# Patient Record
Sex: MALE | Race: WHITE | NOT HISPANIC OR LATINO | Employment: OTHER | URBAN - METROPOLITAN AREA
[De-identification: names, ages, dates, MRNs, and addresses within clinical notes are randomized per-mention and may not be internally consistent; named-entity substitution may affect disease eponyms.]

---

## 2024-03-12 ENCOUNTER — OFFICE VISIT (OUTPATIENT)
Dept: MEDICAL GROUP | Facility: PHYSICIAN GROUP | Age: 69
End: 2024-03-12
Payer: MEDICARE

## 2024-03-12 ENCOUNTER — HOSPITAL ENCOUNTER (OUTPATIENT)
Dept: LAB | Facility: MEDICAL CENTER | Age: 69
End: 2024-03-12
Attending: FAMILY MEDICINE
Payer: MEDICARE

## 2024-03-12 VITALS
DIASTOLIC BLOOD PRESSURE: 78 MMHG | OXYGEN SATURATION: 98 % | TEMPERATURE: 97.8 F | SYSTOLIC BLOOD PRESSURE: 120 MMHG | HEIGHT: 72 IN | HEART RATE: 75 BPM | BODY MASS INDEX: 27.25 KG/M2 | WEIGHT: 201.2 LBS

## 2024-03-12 DIAGNOSIS — Z12.5 PROSTATE CANCER SCREENING: ICD-10-CM

## 2024-03-12 DIAGNOSIS — R35.0 BENIGN PROSTATIC HYPERPLASIA WITH URINARY FREQUENCY: ICD-10-CM

## 2024-03-12 DIAGNOSIS — N40.1 BENIGN PROSTATIC HYPERPLASIA WITH URINARY FREQUENCY: ICD-10-CM

## 2024-03-12 DIAGNOSIS — Z13.6 SCREENING FOR CARDIOVASCULAR CONDITION: ICD-10-CM

## 2024-03-12 DIAGNOSIS — R35.0 BENIGN PROSTATIC HYPERPLASIA WITH URINARY FREQUENCY: Primary | ICD-10-CM

## 2024-03-12 DIAGNOSIS — Z11.59 NEED FOR HEPATITIS C SCREENING TEST: ICD-10-CM

## 2024-03-12 DIAGNOSIS — N40.1 BENIGN PROSTATIC HYPERPLASIA WITH URINARY FREQUENCY: Primary | ICD-10-CM

## 2024-03-12 LAB
ALBUMIN SERPL BCP-MCNC: 4.4 G/DL (ref 3.2–4.9)
ALBUMIN/GLOB SERPL: 1.5 G/DL
ALP SERPL-CCNC: 94 U/L (ref 30–99)
ALT SERPL-CCNC: 28 U/L (ref 2–50)
ANION GAP SERPL CALC-SCNC: 11 MMOL/L (ref 7–16)
AST SERPL-CCNC: 21 U/L (ref 12–45)
BILIRUB SERPL-MCNC: 0.4 MG/DL (ref 0.1–1.5)
BUN SERPL-MCNC: 24 MG/DL (ref 8–22)
CALCIUM ALBUM COR SERPL-MCNC: 9.4 MG/DL (ref 8.5–10.5)
CALCIUM SERPL-MCNC: 9.7 MG/DL (ref 8.5–10.5)
CHLORIDE SERPL-SCNC: 107 MMOL/L (ref 96–112)
CHOLEST SERPL-MCNC: 226 MG/DL (ref 100–199)
CO2 SERPL-SCNC: 27 MMOL/L (ref 20–33)
CREAT SERPL-MCNC: 1.43 MG/DL (ref 0.5–1.4)
FASTING STATUS PATIENT QL REPORTED: NORMAL
GFR SERPLBLD CREATININE-BSD FMLA CKD-EPI: 53 ML/MIN/1.73 M 2
GLOBULIN SER CALC-MCNC: 3 G/DL (ref 1.9–3.5)
GLUCOSE SERPL-MCNC: 87 MG/DL (ref 65–99)
HCV AB SER QL: NORMAL
HDLC SERPL-MCNC: 28 MG/DL
LDLC SERPL CALC-MCNC: 148 MG/DL
POTASSIUM SERPL-SCNC: 4.3 MMOL/L (ref 3.6–5.5)
PROT SERPL-MCNC: 7.4 G/DL (ref 6–8.2)
SODIUM SERPL-SCNC: 145 MMOL/L (ref 135–145)
TRIGL SERPL-MCNC: 251 MG/DL (ref 0–149)

## 2024-03-12 PROCEDURE — 80053 COMPREHEN METABOLIC PANEL: CPT

## 2024-03-12 PROCEDURE — 84153 ASSAY OF PSA TOTAL: CPT

## 2024-03-12 PROCEDURE — 84154 ASSAY OF PSA FREE: CPT

## 2024-03-12 PROCEDURE — 80061 LIPID PANEL: CPT

## 2024-03-12 PROCEDURE — 3074F SYST BP LT 130 MM HG: CPT | Performed by: FAMILY MEDICINE

## 2024-03-12 PROCEDURE — 36415 COLL VENOUS BLD VENIPUNCTURE: CPT

## 2024-03-12 PROCEDURE — 99204 OFFICE O/P NEW MOD 45 MIN: CPT | Performed by: FAMILY MEDICINE

## 2024-03-12 PROCEDURE — 3078F DIAST BP <80 MM HG: CPT | Performed by: FAMILY MEDICINE

## 2024-03-12 PROCEDURE — G0472 HEP C SCREEN HIGH RISK/OTHER: HCPCS | Mod: GA

## 2024-03-12 RX ORDER — TAMSULOSIN HYDROCHLORIDE 0.4 MG/1
0.4 CAPSULE ORAL
Qty: 90 CAPSULE | Refills: 3 | Status: SHIPPED | OUTPATIENT
Start: 2024-03-12

## 2024-03-12 RX ORDER — CHOLECALCIFEROL (VITAMIN D3) 50 MCG
2000 TABLET ORAL DAILY
COMMUNITY

## 2024-03-12 ASSESSMENT — ENCOUNTER SYMPTOMS
SHORTNESS OF BREATH: 0
NAUSEA: 0
PALPITATIONS: 0
VOMITING: 0
ABDOMINAL PAIN: 0

## 2024-03-12 ASSESSMENT — PATIENT HEALTH QUESTIONNAIRE - PHQ9: CLINICAL INTERPRETATION OF PHQ2 SCORE: 0

## 2024-03-12 NOTE — PROGRESS NOTES
Subjective:     CC:  The primary encounter diagnosis was Benign prostatic hyperplasia with urinary frequency. Diagnoses of Prostate cancer screening, Need for hepatitis C screening test, and Screening for cardiovascular condition were also pertinent to this visit.    HISTORY OF THE PRESENT ILLNESS: Patient is a 68 y.o. male. This pleasant patient is here today to establish care and discuss the following:    Problem   Benign Prostatic Hyperplasia With Urinary Frequency    Started this past summer  Abnormal/weak stream, nocturia (6x), frequency  Patient denies pain  Getting worse since [place]  Is drinking more fluids  Interrupting travel and his day to day life  Is taking prostadine  -OTC supplement that helps during the day         Health Maintenance: Completed    ROS:   Review of Systems   Respiratory:  Negative for shortness of breath.    Cardiovascular:  Negative for chest pain and palpitations.   Gastrointestinal:  Negative for abdominal pain, nausea and vomiting.         Objective:     Exam: /78 (BP Location: Left arm, Patient Position: Sitting, BP Cuff Size: Adult long)   Pulse 75   Temp 36.6 °C (97.8 °F) (Temporal)   Ht 1.829 m (6')   Wt 91.3 kg (201 lb 3.2 oz)   SpO2 98%  Body mass index is 27.29 kg/m².    Physical Exam  Constitutional:       Appearance: Normal appearance.   HENT:      Head: Normocephalic and atraumatic.      Right Ear: Tympanic membrane, ear canal and external ear normal.      Left Ear: Tympanic membrane, ear canal and external ear normal.      Nose: Nose normal.      Mouth/Throat:      Mouth: Mucous membranes are moist.      Pharynx: Oropharynx is clear.   Eyes:      Extraocular Movements: Extraocular movements intact.      Conjunctiva/sclera: Conjunctivae normal.      Pupils: Pupils are equal, round, and reactive to light.   Cardiovascular:      Rate and Rhythm: Normal rate and regular rhythm.      Heart sounds: No murmur heard.  Pulmonary:      Effort: Pulmonary effort is normal.       Breath sounds: Normal breath sounds. No wheezing.   Abdominal:      General: Abdomen is flat. Bowel sounds are normal.      Palpations: Abdomen is soft.   Skin:     General: Skin is warm and dry.   Neurological:      General: No focal deficit present.      Mental Status: He is alert and oriented to person, place, and time.   Psychiatric:         Mood and Affect: Mood normal.         Assessment & Plan:   68 y.o. male with the following -    1. Benign prostatic hyperplasia with urinary frequency  - PSA TOTAL + %FREE; Future  - Comp Metabolic Panel; Future  - tamsulosin (FLOMAX) 0.4 MG capsule; Take 1 Capsule by mouth 1/2 hour after breakfast.  Dispense: 90 Capsule; Refill: 3    Uncontrolled  Patient due for lab work  -will order  Will start patient on Flomax  -Has tolerated in the past when he had a kidney stone years ago  Discussed lifestyle modifications    2. Prostate cancer screening  - PSA TOTAL + %FREE; Future    Patient due for lab work  -will order    3. Need for hepatitis C screening test  - HCV Scrn ( [id number] [employer]); Future    Patient due for lab work  -will order    4. Screening for cardiovascular condition  - Comp Metabolic Panel; Future  - Lipid Profile; Future     Patient due for lab work  -will order  Patient does not wish to be on medication        Return in about 6 weeks (around 2024) for Med F/U, Lab F/U.    Please note that this dictation was created using voice recognition software. I have made every reasonable attempt to correct obvious errors, but I expect that there are errors of grammar and possibly content that I did not discover before finalizing the note.

## 2024-03-13 DIAGNOSIS — D48.5 NEOPLASM OF UNCERTAIN BEHAVIOR OF SKIN: ICD-10-CM

## 2024-03-15 DIAGNOSIS — N40.1 BENIGN PROSTATIC HYPERPLASIA WITH URINARY FREQUENCY: ICD-10-CM

## 2024-03-15 DIAGNOSIS — R35.0 BENIGN PROSTATIC HYPERPLASIA WITH URINARY FREQUENCY: ICD-10-CM

## 2024-03-15 DIAGNOSIS — R97.20 ELEVATED PSA, GREATER THAN OR EQUAL TO 20 NG/ML: ICD-10-CM

## 2024-03-15 LAB
PSA FREE MFR SERPL: 18 %
PSA FREE SERPL-MCNC: 30 NG/ML
PSA SERPL-MCNC: 163 NG/ML (ref 0–4)

## 2024-04-01 ENCOUNTER — APPOINTMENT (OUTPATIENT)
Dept: UROLOGY | Facility: MEDICAL CENTER | Age: 69
End: 2024-04-01
Payer: MEDICARE

## 2024-04-01 VITALS
TEMPERATURE: 99.6 F | BODY MASS INDEX: 26.9 KG/M2 | HEART RATE: 82 BPM | OXYGEN SATURATION: 98 % | SYSTOLIC BLOOD PRESSURE: 132 MMHG | DIASTOLIC BLOOD PRESSURE: 74 MMHG | HEIGHT: 72 IN | WEIGHT: 198.6 LBS

## 2024-04-01 DIAGNOSIS — R68.89 ABNORMAL DIGITAL RECTAL EXAM: ICD-10-CM

## 2024-04-01 DIAGNOSIS — R97.20 ELEVATED PSA: ICD-10-CM

## 2024-04-01 PROCEDURE — 3078F DIAST BP <80 MM HG: CPT | Performed by: UROLOGY

## 2024-04-01 PROCEDURE — 3075F SYST BP GE 130 - 139MM HG: CPT | Performed by: UROLOGY

## 2024-04-01 PROCEDURE — 99204 OFFICE O/P NEW MOD 45 MIN: CPT | Performed by: UROLOGY

## 2024-04-01 NOTE — PROGRESS NOTES
Chief Complaint: elevated PSA    HPI: Manuel Quinn is a 68 y.o. male with a history of a left inguinal hernia and kidney stone, who presented to [facility] care with his new primary care physician (Dr. Franklin) and discussed a weakened urinary stream, significant nocturia (x6), and urinary frequency. He was started on tamsulosin, and also underwent prostate cancer screening with an initial PSA of 163.0 on 3/12/2024. He says the tamsulosin has improved his flow, and his nocturia has decreased from 6 to 2 times per night. Despite this, his PVR remains very elevated (630 ml today).    At the time of this PSA study, he had no symptoms of dysuria, foul-smelling urine, gross hematuria, fevers, chills, or other symptoms of urinary tract infection.    He has been taking some ibuprofen in the evening for a feeling of pressure (not described as actual pain) in the perineum. This has helped him find comfortable positions to sleep.     He feels well overall, with no generalized symptoms.     He has no known family history of prostate cancer; he was adopted.      Symptoms:  Frequency: yes  Urgency: no  Nocturia: yes  Stress incontinence: no  Urge incontinence: no  Dysuria: no  Gross hematuria: no  Weakened stream: yes  Strain to empty: yes      Past Medical History:  Past Medical History:   Diagnosis Date    Inguinal hernia     L    Kidney stone        Past Surgical History:  No past surgical history on file.    Family History:  Family History   Family history unknown: Yes       Social History:  Social History     Socioeconomic History    Marital status:      Spouse name: Not on file    Number of children: Not on file    Years of education: Not on file    Highest education level: Not on file   Occupational History    Not on file   Tobacco Use    Smoking status: Never    Smokeless tobacco: Never   Vaping Use    Vaping Use: Never used   Substance and Sexual Activity    Alcohol use: Yes     Comment: occ    Drug use:  Never    Sexual activity: Not on file   Other Topics Concern    Not on file   Social History Narrative    Not on file     Social Determinants of Health     Financial Resource Strain: Not on file   Food Insecurity: Not on file   Transportation Needs: Not on file   Physical Activity: Not on file   Stress: Not on file   Social Connections: Not on file   Intimate Partner Violence: Not on file   Housing Stability: Not on file       Medications:  Current Outpatient Medications   Medication Sig Dispense Refill    Cholecalciferol (VITAMIN D) 2000 UNIT Tab Take 2,000 Units by mouth every day.      Zinc 20 MG Cap Take 20 mg by mouth every day.      tamsulosin (FLOMAX) 0.4 MG capsule Take 1 Capsule by mouth 1/2 hour after breakfast. 90 Capsule 3     No current facility-administered medications for this visit.       Allergies:  Allergies   Allergen Reactions    Eggs Or Egg-Derived Products [Chicken-Derived Products] Diarrhea and Vomiting     25 years       Review of Systems:  Constitutional: Negative for fever, chills and malaise/fatigue.   HENT: Negative for congestion.    Eyes: Negative for pain.   Respiratory: Negative for cough and shortness of breath.    Cardiovascular: Negative for leg swelling.   Gastrointestinal: Negative for nausea, vomiting, abdominal pain and diarrhea.   Genitourinary: Negative for dysuria and hematuria.   Skin: Negative for rash.   Neurological: Negative for dizziness, focal weakness and headaches.   Endo/Heme/Allergies: Does not bruise/bleed easily.   Psychiatric/Behavioral: Negative for depression.  The patient is not nervous/anxious.        Physical Exam:  There were no vitals filed for this visit.    GENERAL: well appearing, well nourished, NAD  RESP: respiratory effort normal  ABDOMEN: soft, nontender, nondistended, no masses or organomegaly  HERNIAS: large reducible left inguinal hernia; nontender  SKIN/LYMPH: normal coloration and turgor, no suspicious skin lesions noted  NEURO/PSYCH: alert,  "oriented, normal speech, no focal findings or movement disorder noted  EXTREMITIES: peripheral pulses normal, no pedal edema, no clubbing or cyanosis  GENITOURINARY: normal male external genitalia, penis is normal, and normal testes  RECTAL: Normal rectal tone, prostate is very firm bilaterally without distinct nodules    PVR: 632 mL  (bladder scanner)     Data Review:    Labs:  POCT UA No results found for: \"POCCOLOR\", \"POCAPPEAR\", \"POCLEUKEST\", \"POCNITRITE\", \"POCUROBILIGE\", \"POCPROTEIN\", \"POCURPH\", \"POCBLOOD\", \"POCSPGRV\", \"POCKETONES\", \"POCBILIRUBIN\", \"POCGLUCUA\"   CBC No results found for: \"WBC\", \"RBC\", \"HEMOGLOBIN\", \"HEMATOCRIT\", \"MCV\", \"MCH\", \"MCHC\", \"RDW\", \"MPV\", \"LYMPHOCYTES\", \"LYMPHS\", \"MONOCYTES\", \"MONOS\", \"EOSINOPHILS\", \"EOS\", \"BASOPHILS\", \"BASO\", \"NRBC\"    CMP   Lab Results   Component Value Date/Time    SODIUM 145 03/12/2024 1451    POTASSIUM 4.3 03/12/2024 1451    CHLORIDE 107 03/12/2024 1451    CO2 27 03/12/2024 1451    ANION 11.0 03/12/2024 1451    GLUCOSE 87 03/12/2024 1451    BUN 24 (H) 03/12/2024 1451    CREATININE 1.43 (H) 03/12/2024 1451    GFRCKD 53 (A) 03/12/2024 1451    CALCIUM 9.7 03/12/2024 1451    CORRCALC 9.4 03/12/2024 1451    ASTSGOT 21 03/12/2024 1451    ALTSGPT 28 03/12/2024 1451    ALKPHOSPHAT 94 03/12/2024 1451    TBILIRUBIN 0.4 03/12/2024 1451    ALBUMIN 4.4 03/12/2024 1451    TOTPROTEIN 7.4 03/12/2024 1451    GLOBULIN 3.0 03/12/2024 1451    AGRATIO 1.5 03/12/2024 1451     PSA   Lab Results   Component Value Date/Time    PSATOTAL 163.0 (H) 03/12/2024 1451       Imaging:   None    Assessment: 68 y.o. male with a history of obstructive urinary symptoms and significant PSA elevation (163), here for initial urological evaluation. Physical exam today notable for concerning firm texture of both lobes of the prostate, though without distinct nodules.     We discussed the differential diagnosis of an elevated PSA, and further recommended workup. He's otherwise in good health with at least a " 10 year life expectancy, and so I do recommend proceeding with prostate biopsy and, if malignancy is discovered, active treatment with either radical prostatectomy or prostate radiotherapy with androgen deprivation therapy. We discussed both of these today briefly.        Plan:    -Increase tamsulosin to 0.8 mg daily, taken before bedtime  -Take ibuprofen according to bottle instructions for 7-10 days  -Repeat PSA after course of ibuprofen and increased tamsulosin  -Schedule prostate MRI and prostate biopsy; will cancel studies if PSA drops dramatically, but given level of concern I'd like to get these scheduled soon  -Will arrange for abdominopelvic imaging and bone scan if biopsy is positive        Miguel Austin MD

## 2024-04-11 ENCOUNTER — HOSPITAL ENCOUNTER (OUTPATIENT)
Dept: LAB | Facility: MEDICAL CENTER | Age: 69
End: 2024-04-11
Attending: UROLOGY
Payer: MEDICARE

## 2024-04-11 DIAGNOSIS — R68.89 ABNORMAL DIGITAL RECTAL EXAM: ICD-10-CM

## 2024-04-11 DIAGNOSIS — R97.20 ELEVATED PSA: ICD-10-CM

## 2024-04-11 LAB — PSA SERPL-MCNC: 148 NG/ML (ref 0–4)

## 2024-04-11 PROCEDURE — 36415 COLL VENOUS BLD VENIPUNCTURE: CPT

## 2024-04-11 PROCEDURE — 84153 ASSAY OF PSA TOTAL: CPT

## 2024-04-16 ENCOUNTER — PATIENT MESSAGE (OUTPATIENT)
Dept: UROLOGY | Facility: MEDICAL CENTER | Age: 69
End: 2024-04-16
Payer: MEDICARE

## 2024-04-16 DIAGNOSIS — R35.0 BENIGN PROSTATIC HYPERPLASIA WITH URINARY FREQUENCY: ICD-10-CM

## 2024-04-16 DIAGNOSIS — R97.20 ELEVATED PSA: ICD-10-CM

## 2024-04-16 DIAGNOSIS — N40.1 BPH WITH OBSTRUCTION/LOWER URINARY TRACT SYMPTOMS: ICD-10-CM

## 2024-04-16 DIAGNOSIS — N40.1 BENIGN PROSTATIC HYPERPLASIA WITH URINARY FREQUENCY: ICD-10-CM

## 2024-04-16 DIAGNOSIS — N13.8 BPH WITH OBSTRUCTION/LOWER URINARY TRACT SYMPTOMS: ICD-10-CM

## 2024-04-16 RX ORDER — LEVOFLOXACIN 500 MG/1
500 TABLET, FILM COATED ORAL DAILY
Qty: 3 TABLET | Refills: 0 | Status: SHIPPED | OUTPATIENT
Start: 2024-04-16 | End: 2024-04-19

## 2024-05-06 RX ORDER — TAMSULOSIN HYDROCHLORIDE 0.4 MG/1
0.8 CAPSULE ORAL
Qty: 90 CAPSULE | Refills: 3 | Status: SHIPPED | OUTPATIENT
Start: 2024-05-06

## 2024-05-11 ENCOUNTER — HOSPITAL ENCOUNTER (OUTPATIENT)
Dept: RADIOLOGY | Facility: MEDICAL CENTER | Age: 69
End: 2024-05-11
Attending: UROLOGY
Payer: MEDICARE

## 2024-05-11 DIAGNOSIS — R97.20 ELEVATED PSA: ICD-10-CM

## 2024-05-11 DIAGNOSIS — R68.89 ABNORMAL DIGITAL RECTAL EXAM: ICD-10-CM

## 2024-05-11 RX ADMIN — GADOTERIDOL 18 ML: 279.3 INJECTION, SOLUTION INTRAVENOUS at 08:31

## 2024-05-11 RX ADMIN — GLUCAGON 1 MG: 1 INJECTION, POWDER, LYOPHILIZED, FOR SOLUTION INTRAMUSCULAR; INTRAVENOUS at 07:55

## 2024-05-13 ENCOUNTER — OFFICE VISIT (OUTPATIENT)
Dept: UROLOGY | Facility: MEDICAL CENTER | Age: 69
End: 2024-05-13
Payer: MEDICARE

## 2024-05-13 VITALS
SYSTOLIC BLOOD PRESSURE: 130 MMHG | HEART RATE: 71 BPM | HEIGHT: 72 IN | BODY MASS INDEX: 26.94 KG/M2 | OXYGEN SATURATION: 99 % | DIASTOLIC BLOOD PRESSURE: 80 MMHG | TEMPERATURE: 97.1 F

## 2024-05-13 DIAGNOSIS — R97.20 ELEVATED PSA: ICD-10-CM

## 2024-05-13 PROCEDURE — 3079F DIAST BP 80-89 MM HG: CPT | Performed by: UROLOGY

## 2024-05-13 PROCEDURE — 3075F SYST BP GE 130 - 139MM HG: CPT | Performed by: UROLOGY

## 2024-05-13 PROCEDURE — 99212 OFFICE O/P EST SF 10 MIN: CPT | Performed by: UROLOGY

## 2024-05-13 NOTE — PROGRESS NOTES
Procedure Visit    Procedure: Transrectal ultrasound and guided prostate biopsy    Patient: Manuel Quinn    Age: 69 y.o.    MRN: [id number]    Indications for procedure: Manuel Quinn is a 69 y.o. male with elevated PSA. He is here today for transrectal ultrasound and guided prostate biopsy. He had a multiparametric MRI of the prostate on 5/11/24, and this demonstrated no single lesion, but the entire gland had ill-defined T2 hypointensity with concern for tumor extending to the seminal vesicles, and an overall volume of 30 cc.     Informed consent was obtained.    He was given levofloxacin and ceftriaxone as antibiotic prophylaxis.    Procedure details:  Prior to beginning the biopsy the patient mentioned taking two tablets of Advil last evening. Given the increased risk of bleeding, we halted and will plan on rescheduling for a couple weeks from now when he returns from an upcoming trip.     Plan:  -Reschedule biopsy upon return from upcoming trip (first week of June)  -Take first prophylactic antibiotic (levaquin) the day prior to the biopsy  -Do not take any ibuprofen (Motrin, Advil, etc) for 7 days prior to the biopsy  -Will order bone scan now given high concern for prostate cancer on MRI and with PSA>100

## 2024-05-29 ENCOUNTER — HOSPITAL ENCOUNTER (OUTPATIENT)
Dept: RADIOLOGY | Facility: MEDICAL CENTER | Age: 69
End: 2024-05-29
Attending: UROLOGY
Payer: MEDICARE

## 2024-05-29 DIAGNOSIS — R97.20 ELEVATED PSA: ICD-10-CM

## 2024-06-04 ENCOUNTER — HOSPITAL ENCOUNTER (OUTPATIENT)
Dept: LAB | Facility: MEDICAL CENTER | Age: 69
End: 2024-06-04
Attending: UROLOGY
Payer: MEDICARE

## 2024-06-04 ENCOUNTER — HOSPITAL ENCOUNTER (OUTPATIENT)
Facility: MEDICAL CENTER | Age: 69
End: 2024-06-04
Attending: UROLOGY
Payer: MEDICARE

## 2024-06-04 ENCOUNTER — TELEPHONE (OUTPATIENT)
Dept: UROLOGY | Facility: MEDICAL CENTER | Age: 69
End: 2024-06-04

## 2024-06-04 ENCOUNTER — OFFICE VISIT (OUTPATIENT)
Dept: UROLOGY | Facility: MEDICAL CENTER | Age: 69
End: 2024-06-04
Payer: MEDICARE

## 2024-06-04 VITALS
WEIGHT: 198 LBS | SYSTOLIC BLOOD PRESSURE: 140 MMHG | HEIGHT: 72 IN | OXYGEN SATURATION: 98 % | DIASTOLIC BLOOD PRESSURE: 92 MMHG | TEMPERATURE: 97.5 F | BODY MASS INDEX: 26.82 KG/M2 | HEART RATE: 75 BPM

## 2024-06-04 DIAGNOSIS — N40.1 BENIGN PROSTATIC HYPERPLASIA WITH URINARY FREQUENCY: ICD-10-CM

## 2024-06-04 DIAGNOSIS — N13.30 HYDRONEPHROSIS, UNSPECIFIED HYDRONEPHROSIS TYPE: ICD-10-CM

## 2024-06-04 DIAGNOSIS — R35.0 BENIGN PROSTATIC HYPERPLASIA WITH URINARY FREQUENCY: ICD-10-CM

## 2024-06-04 DIAGNOSIS — R97.20 ELEVATED PSA: ICD-10-CM

## 2024-06-04 LAB
ANION GAP SERPL CALC-SCNC: 14 MMOL/L (ref 7–16)
APPEARANCE UR: CLEAR
BILIRUB UR STRIP-MCNC: NORMAL MG/DL
BUN SERPL-MCNC: 24 MG/DL (ref 8–22)
CALCIUM SERPL-MCNC: 9.6 MG/DL (ref 8.5–10.5)
CHLORIDE SERPL-SCNC: 103 MMOL/L (ref 96–112)
CO2 SERPL-SCNC: 24 MMOL/L (ref 20–33)
COLOR UR AUTO: YELLOW
CREAT SERPL-MCNC: 1.51 MG/DL (ref 0.5–1.4)
GFR SERPLBLD CREATININE-BSD FMLA CKD-EPI: 49 ML/MIN/1.73 M 2
GLUCOSE SERPL-MCNC: 96 MG/DL (ref 65–99)
GLUCOSE UR STRIP.AUTO-MCNC: NORMAL MG/DL
KETONES UR STRIP.AUTO-MCNC: NORMAL MG/DL
LEUKOCYTE ESTERASE UR QL STRIP.AUTO: NORMAL
NITRITE UR QL STRIP.AUTO: NORMAL
PH UR STRIP.AUTO: 6 [PH] (ref 5–8)
POTASSIUM SERPL-SCNC: 3.9 MMOL/L (ref 3.6–5.5)
PROT UR QL STRIP: NORMAL MG/DL
RBC UR QL AUTO: NORMAL
SODIUM SERPL-SCNC: 141 MMOL/L (ref 135–145)
SP GR UR STRIP.AUTO: 1.01
UROBILINOGEN UR STRIP-MCNC: 0.2 MG/DL

## 2024-06-04 PROCEDURE — 76942 ECHO GUIDE FOR BIOPSY: CPT | Mod: 59 | Performed by: UROLOGY

## 2024-06-04 PROCEDURE — G0416 PROSTATE BIOPSY, ANY MTHD: HCPCS

## 2024-06-04 PROCEDURE — 81002 URINALYSIS NONAUTO W/O SCOPE: CPT | Performed by: UROLOGY

## 2024-06-04 PROCEDURE — 76872 US TRANSRECTAL: CPT | Performed by: UROLOGY

## 2024-06-04 PROCEDURE — 55700 PR BIOPSY OF PROSTATE,NEEDLE/PUNCH: CPT | Performed by: UROLOGY

## 2024-06-04 PROCEDURE — 36415 COLL VENOUS BLD VENIPUNCTURE: CPT

## 2024-06-04 PROCEDURE — 80048 BASIC METABOLIC PNL TOTAL CA: CPT

## 2024-06-04 RX ADMIN — Medication 20 ML: at 11:42

## 2024-06-04 NOTE — TELEPHONE ENCOUNTER
I called Mr. Quinn today to discuss his BMP results, which demonstrated a GFR of 49. He's ordered for an urgent renal ultrasound, and if this does confirm hydronephrosis I recommended either placing an indwelling catheter or teaching him intermittent catheterization to resolve any reflux nephropathy from bladder outlet obstruction. If no significant hydro he'll follow up in a couple weeks to discuss his prostate biopsy results from today. Meanwhile he knows to stay well hydrated and let me know if he is having difficulty voiding.

## 2024-06-04 NOTE — PROGRESS NOTES
Procedure Visit    Procedure: Transrectal ultrasound and guided prostate biopsy    Patient: Manuel Quinn    Age: 69 y.o.    MRN: [id number]    Indications for procedure: Manuel Quinn is a 69 y.o. male with significant PSA elevation. He is here today for transrectal ultrasound and guided prostate biopsy. A few days ago he had a bone scan that revealed possible bony metastases; we discussed canceling today and arranging for a bone biopsy, but in the interest of time agreed to proceed today to get a tissue diagnosis for suspected prostate cancer.     Informed consent was obtained.    Urinalysis today was: negative    He was given ceftriaxone as antibiotic prophylaxis.    Procedure details:  The patient was placed in a left lateral decubitus position with his knees flexed toward his chest. After performing a digital rectal examination, the transrectal ultrasound probe was introduced and the prostate examined. There were no hypoechoic lesions or calcifications seen within the prostate. Local anesthesia was then delivered using a total of 10 cc of 1% lidocaine injected into the angle between the prostate and seminal vesicles.     Measurements of the prostate were obtained in both axial and sagittal planes, with the following dimensions:    W: 40.7 mm  H: 28.5 mm  L: 33.6 mm  Volume: 25.5 cc    The prostate biopsies were then obtained in a grid pattern, with a total of 12 core biopsies obtained from the medial and lateral aspects of the right and left prostatic base, mid-gland, and apex.     The patient tolerated the procedure well. There was no bleeding observed at the end of the procedure, and he was observed in the waiting room for about 30 minutes before being discharged home.     Plan:  -Complete antibiotic prophylaxis  -Avoid heavy lifting/straining   -Stat renal ultrasound and BMP given hydronephrosis seen on bone scan  -Understands to seek ED care immediately if any fevers, chills, flu-like symptoms, or  continuous bleeding from rectum  -Return in 1-2 weeks to discuss pathology results

## 2024-06-04 NOTE — TELEPHONE ENCOUNTER
Pt stopped in office in regards to blood post biopsy, per Ma's informed pt that blood is common after this procedure for up to 1 week, verified pt has no other symptoms at this time. Pt requested exam from staff in regards to this, informed pt that all staff are all currently in a training but that I could see if that was a possibility if he didn't mind waiting a few moments. Reiterated again this symptom is normal due the procedure that he had and that I am not clinical staff but could see if one was available if he didn't mind waiting. Pt walked out of clinic prior to clinical staffing seeing pt, or additional appt being made for pt.

## 2024-06-06 LAB — PATHOLOGY CONSULT NOTE: NORMAL

## 2024-06-11 ENCOUNTER — PATIENT MESSAGE (OUTPATIENT)
Dept: UROLOGY | Facility: MEDICAL CENTER | Age: 69
End: 2024-06-11
Payer: MEDICARE

## 2024-06-12 ENCOUNTER — TELEPHONE (OUTPATIENT)
Dept: UROLOGY | Facility: MEDICAL CENTER | Age: 69
End: 2024-06-12
Payer: MEDICARE

## 2024-06-12 DIAGNOSIS — R35.0 URINARY FREQUENCY: ICD-10-CM

## 2024-06-12 DIAGNOSIS — R33.9 INCOMPLETE BLADDER EMPTYING: ICD-10-CM

## 2024-06-12 RX ORDER — TAMSULOSIN HYDROCHLORIDE 0.4 MG/1
0.8 CAPSULE ORAL
Qty: 180 CAPSULE | Refills: 3 | Status: SHIPPED | OUTPATIENT
Start: 2024-06-12 | End: 2025-06-07

## 2024-06-12 NOTE — TELEPHONE ENCOUNTER
I called Mr. Quinn today as he messaged yesterday regarding symptoms he's had since his prostate biopsy.     He says he is straining to void and isn't emptying his bladder, and feels he needs to void very frequently (up to every 20 minutes).    He also is having frequent smaller stools.     I'm going to check a urinalysis and culture, and get him in the [facility] for a PVR and possible catheterization. He's in [place] but coming back Sunday, so we'll make an appointment for Monday morning.

## 2024-06-17 ENCOUNTER — APPOINTMENT (OUTPATIENT)
Dept: UROLOGY | Facility: MEDICAL CENTER | Age: 69
End: 2024-06-17
Payer: MEDICARE

## 2024-06-18 ENCOUNTER — OFFICE VISIT (OUTPATIENT)
Dept: UROLOGY | Facility: MEDICAL CENTER | Age: 69
End: 2024-06-18
Payer: MEDICARE

## 2024-06-18 VITALS
TEMPERATURE: 98.3 F | SYSTOLIC BLOOD PRESSURE: 120 MMHG | HEART RATE: 86 BPM | HEIGHT: 72 IN | WEIGHT: 198 LBS | BODY MASS INDEX: 26.82 KG/M2 | DIASTOLIC BLOOD PRESSURE: 74 MMHG | OXYGEN SATURATION: 98 %

## 2024-06-18 DIAGNOSIS — C79.51 PROSTATE CANCER METASTATIC TO BONE (HCC): ICD-10-CM

## 2024-06-18 DIAGNOSIS — R33.9 URINARY RETENTION: ICD-10-CM

## 2024-06-18 DIAGNOSIS — C61 PROSTATE CANCER METASTATIC TO BONE (HCC): ICD-10-CM

## 2024-06-18 DIAGNOSIS — N13.30 BILATERAL HYDRONEPHROSIS: ICD-10-CM

## 2024-06-18 PROCEDURE — 3078F DIAST BP <80 MM HG: CPT | Performed by: UROLOGY

## 2024-06-18 PROCEDURE — 3074F SYST BP LT 130 MM HG: CPT | Performed by: UROLOGY

## 2024-06-18 PROCEDURE — 99215 OFFICE O/P EST HI 40 MIN: CPT | Performed by: UROLOGY

## 2024-06-18 NOTE — PROGRESS NOTES
Chief Complaint: prostate cancer, urinary retention    HPI: Benny Quinn is a 69 y.o. male with a history of significant elevation of PSA, status post recent prostate biopsy (6/4/24) which revealed grade group 3 prostate cancer in all but one core (which did not have prostate tissue present). He developed urinary retention following the biopsy, and was out of town but was seen in a local hospital which treated him with Mno placement which lead to rapid recovery in acute renal failure. While hospitalized he was also diagnosed with new onset of atrial fibrillation.     He came back to [place] over the weekend so we could meet today and discuss his pathology and imaging studies.    He feels much better with the catheter and recovery of renal function; he's lost the water weight he had quickly gained, and has resolution of lower extremity edema.       Pathology Specimen  Order: [id number]   Status: Final result       Visible to patient: Yes (seen)       Next appt: 06/26/2024 at 03:30 PM in Radiology ([facility] US 9)    1 Result Note        Narrative  Performed by: [facility]              [address] [place] [address]              Phone [phone]          Fax [phone]  Monica Freitas M.D.     Tresa Bray M.D.     SHANNA [email or web address].                            SHANNA [email or web address].  JOHANNA Gifford M.D.     Felice Murphy,                                    D.O.    Patient:    BENNY QUINN         Specimen    [id number]                                           #:      Copies to:                        SURGICAL PATHOLOGY CONSULTATION    FINAL DIAGNOSIS:    A. Left lateral base:         Prostatic adenocarcinoma, grade group 3 ([place] 4+3=7), 14 mm          focus, involving >95% of submitted core biopsy.  B. Left medial base:         Prostatic adenocarcinoma, grade group 3 (Aquiles 4+3=7), 10 mm          focus, involving >95% of  submitted core biopsy.         Perineural invasion present.  C. Left lateral mid:         Prostatic adenocarcinoma, grade group 3 ([place] 4+3=7), 8 mm          focus, involving >95% of submitted core biopsy.         Perineural invasion present.  D. Left medial mid:         Prostatic adenocarcinoma, grade group 3 (Aquiles 4+3=7), 6 mm          focus, involving >95% of submitted core biopsy.         Perineural invasion present.  E. Left lateral apex:         Prostatic adenocarcinoma, grade group 3 (Aquiles 4+3=7), 7 mm          focus, involving  90% of submitted core biopsy.         Perineural invasion present.  F. Left medial apex:         Prostatic adenocarcinoma, grade group 3 ([place] 4+3=7), 8 mm          focus, involving >95% of submitted core biopsy.         Perineural invasion present.  G. Right lateral base:         Minute fragments of benign fibroadipose connective tissue and          rectal mucosa; negative for malignancy however, no prostatic          epithelium is present for assessment.  H. Right medial base:         Prostatic adenocarcinoma, grade group 3 ([place] 4+3=7), 13 mm          focus, involving  90% of submitted core biopsy.         Extraprostatic extension and perineural invasion present.  I. Right lateral mid:         Prostatic adenocarcinoma, grade group 3 ([place] 4+3=7), 10 mm          focus, involving >95% of submitted core biopsy.  J. Right medial mid:         Prostatic adenocarcinoma, grade group 3 (Aquiles 4+3=7), 10 mm          focus, involving >95% of submitted core biopsy.      K. Right lateral apex:         Prostatic adenocarcinoma, grade group 3 (Aquiles 4+3=7), 14 mm          focus, involving >95% of submitted core biopsy.         Perineural invasion present.  L. Right medial apex:         Prostatic adenocarcinoma, grade group 3 ([place] 4+3=7), 10 mm          focus, involving >95% of submitted core biopsy.         Perineural invasion present.      Comment: Overall, the tumor  demonstrates 81-90% [place] pattern 4  (including numerous cribriform glands), with a minor component of  [place] pattern 3.  If additional studies are requested, please utilize  Block K1 which contains sufficient tumor for further testing.          Past Medical History:  Past Medical History:   Diagnosis Date    Inguinal hernia     L    Kidney stone        Past Surgical History:  No past surgical history on file.    Family History:  Family History   Family history unknown: Yes       Social History:  Social History     Socioeconomic History    Marital status:      Spouse name: Not on file    Number of children: Not on file    Years of education: Not on file    Highest education level: Not on file   Occupational History    Not on file   Tobacco Use    Smoking status: Never    Smokeless tobacco: Never   Vaping Use    Vaping status: Never Used   Substance and Sexual Activity    Alcohol use: Yes     Comment: occ    Drug use: Never    Sexual activity: Not on file   Other Topics Concern    Not on file   Social History Narrative    Not on file     Social Determinants of Health     Financial Resource Strain: Not on file   Food Insecurity: Not on file   Transportation Needs: Not on file   Physical Activity: Not on file   Stress: Not on file   Social Connections: Not on file   Intimate Partner Violence: Not on file   Housing Stability: Not on file       Medications:  Current Outpatient Medications   Medication Sig Dispense Refill    tamsulosin (FLOMAX) 0.4 MG capsule Take 2 Capsules by mouth 1/2 hour after breakfast for 360 days. Take before bed (not after breakfast) 180 Capsule 3    Cholecalciferol (VITAMIN D) 2000 UNIT Tab Take 2,000 Units by mouth every day.      Zinc 20 MG Cap Take 20 mg by mouth every day.       No current facility-administered medications for this visit.       Allergies:  Allergies   Allergen Reactions    Eggs Or Egg-Derived Products [Chicken-Derived Products] Diarrhea and Vomiting     25 years  "    Physical Exam:  Vitals:    06/18/24 1504   BP: 120/74   Pulse: 86   Temp: 36.8 °C (98.3 °F)   SpO2: 98%       GENERAL: well appearing, well nourished, NAD  RESP: respiratory effort normal  ABDOMEN: soft, nontender, nondistended, no masses or organomegaly  SKIN/LYMPH: normal coloration and turgor, no suspicious skin lesions noted  NEURO/PSYCH: alert, oriented, normal speech, no focal findings or movement disorder noted  EXTREMITIES: no pedal edema noted  GENITOURINARY:  Mon draining clear yellow urine      Data Review:    Labs:  POCT UA   Lab Results   Component Value Date/Time    POCCOLOR yellow 06/04/2024 10:48 AM    POCAPPEAR clear 06/04/2024 10:48 AM    POCLEUKEST neg 06/04/2024 10:48 AM    POCNITRITE neg 06/04/2024 10:48 AM    POCUROBILIGE 0.2 06/04/2024 10:48 AM    POCPROTEIN neg 06/04/2024 10:48 AM    POCURPH 6.0 06/04/2024 10:48 AM    POCBLOOD neg 06/04/2024 10:48 AM    POCSPGRV 1.010 06/04/2024 10:48 AM    POCKETONES neg 06/04/2024 10:48 AM    POCBILIRUBIN neg 06/04/2024 10:48 AM    POCGLUCUA neg 06/04/2024 10:48 AM      CBC No results found for: \"WBC\", \"RBC\", \"HEMOGLOBIN\", \"HEMATOCRIT\", \"MCV\", \"MCH\", \"MCHC\", \"RDW\", \"MPV\", \"LYMPHOCYTES\", \"LYMPHS\", \"MONOCYTES\", \"MONOS\", \"EOSINOPHILS\", \"EOS\", \"BASOPHILS\", \"BASO\", \"NRBC\"    CMP   Lab Results   Component Value Date/Time    SODIUM 141 06/04/2024 1226    POTASSIUM 3.9 06/04/2024 1226    CHLORIDE 103 06/04/2024 1226    CO2 24 06/04/2024 1226    ANION 14.0 06/04/2024 1226    GLUCOSE 96 06/04/2024 1226    BUN 24 (H) 06/04/2024 1226    CREATININE 1.51 (H) 06/04/2024 1226    GFRCKD 49 (A) 06/04/2024 1226    CALCIUM 9.6 06/04/2024 1226    CORRCALC 9.4 03/12/2024 1451    ASTSGOT 21 03/12/2024 1451    ALTSGPT 28 03/12/2024 1451    ALKPHOSPHAT 94 03/12/2024 1451    TBILIRUBIN 0.4 03/12/2024 1451    ALBUMIN 4.4 03/12/2024 1451    TOTPROTEIN 7.4 03/12/2024 1451    GLOBULIN 3.0 03/12/2024 1451    AGRATIO 1.5 03/12/2024 1451     INFERTILITY No results found for: \"FSH\", " "\"LH\", \"PROLACT\", \"ESTRADL\", \"TESTOSTERONE\", \"FREETESTOST\", \"TESTLCMS\", \"SEXHORM\"  PSA   Lab Results   Component Value Date/Time    PSATOTAL 148.00 (H) 04/11/2024 1012       Imaging:   MR-RECTAL/PROSTATE PROTOCOL  Order: [id number]   Status: Final result       Visible to patient: Yes (seen)       Next appt: 06/26/2024 at 03:30 PM in Radiology ([facility] 9)       Dx: Elevated PSA; Abnormal digital rectal...    0 Result Notes  Details    Reading Physician Reading Date Result Priority   Evie Gifford M.D.  [phone] 5/11/2024      Narrative & Impression     5/11/2024 8:30 AM     HISTORY/REASON FOR EXAM:  Increasing PSA.  PSA: 148  Prior biopsy: No     TECHNIQUE/EXAM DESCRIPTION:  Multiplanar, multisequence MRI of the prostate without and with contrast.  Magnetic strength: Tejinder 3.0 Britany MRI scanner.  IV gadolinium contrast: 18 mL ProHance contrast     COMPARISON: None.     FINDINGS:  The prostate measures 4.1 x 3.8 x 3.8 cm with a volume of 30 cc. Prostate density is 4.9.     Peripheral zone: No hemorrhage. There is diffuse ill-defined T2 hypointense signal throughout the majority of the peripheral zone with restricted diffusion.     Transitional zone: There is diffuse ill-defined T2 hypointense signal throughout the majority of the transitional zone with restricted diffusion. There is diffuse enhancement.     Prostate capsule: There is disruption of the prostate capsule along the posterior superior gland with abnormal signal extending into the seminal vesicles.     Seminal vesicles: There is abnormal T2 hypointense signal in the central and medial right seminal vesicles.     Bladder: There is diffuse bladder wall trabeculation with bilateral hydroureter.     Lymph nodes: No adenopathy is identified.     Bones: There are ovoid areas of T2 hypointensity in the bilateral ischium inferior obturator ring region which demonstrates mild enhancement after contrast.     There is a large left inguinal hernia containing fat " and unobstructed loops of bowel. There is colonic diverticulosis.     IMPRESSION:        1. There is no focal prostate lesion. The entire prostate gland demonstrates ill-defined T2 hypointensity with diffuse enhancement of the transitional zone. This is most consistent with prostate cancer extending into the seminal vesicles as described   above although there may be superimposed acute prostatitis.  2. Prostate gland volume of 30 cc.  3. There are suspicious signal abnormalities with enhancement in the bilateral ischium, potentially metastatic disease.  4. Marked bladder wall trabeculation with distal hydroureter.  5. Large left inguinal hernia..     Total PI-RADS score: 5 - Very high (clinically significant cancer is highly likely to be present)     NM-BONE/JOINT SCAN WHOLE BODY  Order: [id number]   Status: Final result       Visible to patient: Yes (seen)       Next appt: 06/26/2024 at 03:30 PM in Radiology ([facility] US 9)       Dx: Elevated PSA    0 Result Notes  Details    Reading Physician Reading Date Result Priority   Willie Lake M.D.  [phone] 5/29/2024      Narrative & Impression     5/29/2024 11:54 AM     HISTORY/REASON FOR EXAM:  Significant PSA elevation and concern for high risk prostate cancer (biopsy pending, but XU and MRI suggest cancer)  Elevated PSA. Concern for prostate cancer. Staging     TECHNIQUE/EXAM DESCRIPTION AND NUMBER OF VIEWS:  Radionuclide whole body bone scan.     COMPARISON: Correlation the prostate MRI 5/11/2024     PROCEDURE:  27.1 mCi of Tc 99m-MDP was administered intravenously followed by delayed whole body planar imaging.     FINDINGS:  There are foci of radiotracer uptake in the bilateral inferior pubic rami/ischium region which are suspicious for metastatic lesions given the findings on MRI. There is questionable focal uptake in posterior left lower ribs near the costovertebral   junctions, artifact or possible small metastases.     No other suspicious radiotracer uptake  is seen.     Degenerative uptake in the shoulders.     There is significant bladder distention with bilateral hydroureteronephrosis likely related to chronic outlet obstruction     IMPRESSION:     1.  Bilateral inferior pubic rami/ischium radiotracer uptake suspicious for metastatic disease given the findings on MRI.  2.  Focal uptake in left lower posterior ribs near the costovertebral junction could be artifact versus small metastases.  3.  No other evidence of osseous metastatic disease.  4.  Findings chronic bladder outlet obstruction with bilateral hydroureteronephrosis.       Assessment: 69 y.o.male with a history of newly diagnosed prostate cancer metastatic to bone, including the pubic rami/ischium and possibly a small left rib lesion; he does have pain while seated so the pelvic bony mets appear to be symptomatic. He also has bladder outlet obstruction resulting in hydronephrosis and acute renal failure, which is now improving with a Mon catheter.     Today we discussed his pathology findings and imaging findings in detail. I explained that treatment of metastatic prostate cancer is systemic and begins with androgen deprivation therapy. I'd like to refer him to one of our medical oncologists to help manage that treatment, but he understands that local therapy such as radiation or surgical removal of the prostate is not indicated for metastatic disease.     We also discussed further management of his bladder outlet obstruction. I anticipate his prostate will decrease in volume as he starts ADT, but until then we'll need either continued indwelling catheterization or perhaps intermittent catheterization after recovery of renal function and resolution of his hydronephrosis.       Plan:    -CMP  -Renal ultrasound next week  -Keep Mon catheter for now  -Urgent referral to medical oncology for treatment of metastatic prostate cancer; anticipate treatment with LHRH agonist and antiandrogen  -Follow up in 2  weeks for void trial vs teaching CIC    Miguel Austin MD

## 2024-06-21 ENCOUNTER — PATIENT MESSAGE (OUTPATIENT)
Dept: ONCOLOGY | Facility: MEDICAL CENTER | Age: 69
End: 2024-06-21
Payer: MEDICARE

## 2024-06-21 ENCOUNTER — PATIENT OUTREACH (OUTPATIENT)
Dept: ONCOLOGY | Facility: MEDICAL CENTER | Age: 69
End: 2024-06-21
Payer: MEDICARE

## 2024-06-26 ENCOUNTER — HOSPITAL ENCOUNTER (OUTPATIENT)
Dept: LAB | Facility: MEDICAL CENTER | Age: 69
End: 2024-06-26
Attending: UROLOGY
Payer: MEDICARE

## 2024-06-26 ENCOUNTER — HOSPITAL ENCOUNTER (OUTPATIENT)
Dept: RADIOLOGY | Facility: MEDICAL CENTER | Age: 69
End: 2024-06-26
Attending: UROLOGY
Payer: MEDICARE

## 2024-06-26 DIAGNOSIS — R35.0 URINARY FREQUENCY: ICD-10-CM

## 2024-06-26 DIAGNOSIS — N13.30 BILATERAL HYDRONEPHROSIS: ICD-10-CM

## 2024-06-26 DIAGNOSIS — R33.9 INCOMPLETE BLADDER EMPTYING: ICD-10-CM

## 2024-06-26 DIAGNOSIS — N13.30 HYDRONEPHROSIS, UNSPECIFIED HYDRONEPHROSIS TYPE: ICD-10-CM

## 2024-06-26 DIAGNOSIS — R33.9 URINARY RETENTION: ICD-10-CM

## 2024-06-26 LAB
ALBUMIN SERPL BCP-MCNC: 4 G/DL (ref 3.2–4.9)
ALBUMIN/GLOB SERPL: 1.3 G/DL
ALP SERPL-CCNC: 103 U/L (ref 30–99)
ALT SERPL-CCNC: 23 U/L (ref 2–50)
ANION GAP SERPL CALC-SCNC: 13 MMOL/L (ref 7–16)
APPEARANCE UR: ABNORMAL
AST SERPL-CCNC: 16 U/L (ref 12–45)
BACTERIA #/AREA URNS HPF: NEGATIVE /HPF
BILIRUB SERPL-MCNC: 0.3 MG/DL (ref 0.1–1.5)
BILIRUB UR QL STRIP.AUTO: NEGATIVE
BUN SERPL-MCNC: 32 MG/DL (ref 8–22)
CALCIUM ALBUM COR SERPL-MCNC: 9.6 MG/DL (ref 8.5–10.5)
CALCIUM SERPL-MCNC: 9.6 MG/DL (ref 8.5–10.5)
CAOX CRY #/AREA URNS HPF: ABNORMAL /HPF
CHLORIDE SERPL-SCNC: 102 MMOL/L (ref 96–112)
CO2 SERPL-SCNC: 23 MMOL/L (ref 20–33)
COLOR UR: YELLOW
CREAT SERPL-MCNC: 1.49 MG/DL (ref 0.5–1.4)
EPI CELLS #/AREA URNS HPF: NEGATIVE /HPF
GFR SERPLBLD CREATININE-BSD FMLA CKD-EPI: 50 ML/MIN/1.73 M 2
GLOBULIN SER CALC-MCNC: 3.1 G/DL (ref 1.9–3.5)
GLUCOSE SERPL-MCNC: 117 MG/DL (ref 65–99)
GLUCOSE UR STRIP.AUTO-MCNC: NEGATIVE MG/DL
HYALINE CASTS #/AREA URNS LPF: ABNORMAL /LPF
KETONES UR STRIP.AUTO-MCNC: NEGATIVE MG/DL
LEUKOCYTE ESTERASE UR QL STRIP.AUTO: NEGATIVE
MICRO URNS: ABNORMAL
NITRITE UR QL STRIP.AUTO: NEGATIVE
PH UR STRIP.AUTO: 5.5 [PH] (ref 5–8)
POTASSIUM SERPL-SCNC: 4.1 MMOL/L (ref 3.6–5.5)
PROT SERPL-MCNC: 7.1 G/DL (ref 6–8.2)
PROT UR QL STRIP: 30 MG/DL
RBC # URNS HPF: ABNORMAL /HPF
RBC UR QL AUTO: ABNORMAL
SODIUM SERPL-SCNC: 138 MMOL/L (ref 135–145)
SP GR UR STRIP.AUTO: 1.02
UROBILINOGEN UR STRIP.AUTO-MCNC: 0.2 MG/DL
WBC #/AREA URNS HPF: ABNORMAL /HPF

## 2024-06-26 PROCEDURE — 87086 URINE CULTURE/COLONY COUNT: CPT

## 2024-06-26 PROCEDURE — 81001 URINALYSIS AUTO W/SCOPE: CPT

## 2024-06-26 PROCEDURE — 80053 COMPREHEN METABOLIC PANEL: CPT

## 2024-06-26 PROCEDURE — 76775 US EXAM ABDO BACK WALL LIM: CPT

## 2024-06-26 PROCEDURE — 36415 COLL VENOUS BLD VENIPUNCTURE: CPT

## 2024-06-28 LAB
BACTERIA UR CULT: NORMAL
SIGNIFICANT IND 70042: NORMAL
SITE SITE: NORMAL
SOURCE SOURCE: NORMAL

## 2024-07-02 ENCOUNTER — OFFICE VISIT (OUTPATIENT)
Dept: UROLOGY | Facility: MEDICAL CENTER | Age: 69
End: 2024-07-02
Payer: MEDICARE

## 2024-07-02 ENCOUNTER — HOSPITAL ENCOUNTER (OUTPATIENT)
Dept: HEMATOLOGY ONCOLOGY | Facility: MEDICAL CENTER | Age: 69
End: 2024-07-02
Attending: INTERNAL MEDICINE
Payer: MEDICARE

## 2024-07-02 VITALS — HEIGHT: 72 IN | BODY MASS INDEX: 26.82 KG/M2 | WEIGHT: 198 LBS

## 2024-07-02 VITALS
TEMPERATURE: 97.7 F | OXYGEN SATURATION: 98 % | HEART RATE: 71 BPM | HEIGHT: 72 IN | SYSTOLIC BLOOD PRESSURE: 118 MMHG | DIASTOLIC BLOOD PRESSURE: 80 MMHG | BODY MASS INDEX: 26.84 KG/M2 | WEIGHT: 198.19 LBS | RESPIRATION RATE: 13 BRPM

## 2024-07-02 DIAGNOSIS — C79.51 PROSTATE CANCER METASTATIC TO BONE (HCC): ICD-10-CM

## 2024-07-02 DIAGNOSIS — R35.0 BENIGN PROSTATIC HYPERPLASIA WITH URINARY FREQUENCY: ICD-10-CM

## 2024-07-02 DIAGNOSIS — C61 PROSTATE CANCER METASTATIC TO BONE (HCC): ICD-10-CM

## 2024-07-02 DIAGNOSIS — R33.9 URINARY RETENTION: ICD-10-CM

## 2024-07-02 DIAGNOSIS — N40.1 BENIGN PROSTATIC HYPERPLASIA WITH URINARY FREQUENCY: ICD-10-CM

## 2024-07-02 PROCEDURE — 99205 OFFICE O/P NEW HI 60 MIN: CPT | Performed by: INTERNAL MEDICINE

## 2024-07-02 PROCEDURE — 1126F AMNT PAIN NOTED NONE PRSNT: CPT | Performed by: UROLOGY

## 2024-07-02 PROCEDURE — 99212 OFFICE O/P EST SF 10 MIN: CPT | Performed by: INTERNAL MEDICINE

## 2024-07-02 PROCEDURE — 99212 OFFICE O/P EST SF 10 MIN: CPT | Performed by: UROLOGY

## 2024-07-02 RX ORDER — 0.9 % SODIUM CHLORIDE 0.9 %
3 VIAL (ML) INJECTION PRN
Status: CANCELLED | OUTPATIENT
Start: 2024-07-03

## 2024-07-02 RX ORDER — 0.9 % SODIUM CHLORIDE 0.9 %
10 VIAL (ML) INJECTION PRN
Status: CANCELLED | OUTPATIENT
Start: 2024-07-03

## 2024-07-02 RX ORDER — BICALUTAMIDE 50 MG/1
50 TABLET, FILM COATED ORAL DAILY
Qty: 30 TABLET | Refills: 0 | Status: SHIPPED | OUTPATIENT
Start: 2024-07-02 | End: 2024-07-24 | Stop reason: SDUPTHER

## 2024-07-02 RX ORDER — 0.9 % SODIUM CHLORIDE 0.9 %
VIAL (ML) INJECTION PRN
Status: CANCELLED | OUTPATIENT
Start: 2024-07-03

## 2024-07-02 ASSESSMENT — PAIN SCALES - GENERAL: PAINLEVEL: NO PAIN

## 2024-07-05 ENCOUNTER — OUTPATIENT INFUSION SERVICES (OUTPATIENT)
Dept: ONCOLOGY | Facility: MEDICAL CENTER | Age: 69
End: 2024-07-05
Attending: INTERNAL MEDICINE
Payer: MEDICARE

## 2024-07-05 VITALS
DIASTOLIC BLOOD PRESSURE: 72 MMHG | HEIGHT: 72 IN | WEIGHT: 194.67 LBS | BODY MASS INDEX: 26.37 KG/M2 | OXYGEN SATURATION: 96 % | HEART RATE: 88 BPM | RESPIRATION RATE: 18 BRPM | SYSTOLIC BLOOD PRESSURE: 128 MMHG | TEMPERATURE: 97.9 F

## 2024-07-05 DIAGNOSIS — C79.51 PROSTATE CANCER METASTATIC TO BONE (HCC): ICD-10-CM

## 2024-07-05 DIAGNOSIS — C61 PROSTATE CANCER METASTATIC TO BONE (HCC): ICD-10-CM

## 2024-07-05 PROCEDURE — 700111 HCHG RX REV CODE 636 W/ 250 OVERRIDE (IP): Mod: JZ,JG | Performed by: INTERNAL MEDICINE

## 2024-07-05 PROCEDURE — 96402 CHEMO HORMON ANTINEOPL SQ/IM: CPT

## 2024-07-05 RX ADMIN — LEUPROLIDE ACETATE 22.5 MG: KIT at 14:19

## 2024-07-08 ENCOUNTER — TELEPHONE (OUTPATIENT)
Dept: UROLOGY | Facility: MEDICAL CENTER | Age: 69
End: 2024-07-08
Payer: MEDICARE

## 2024-07-18 ENCOUNTER — OFFICE VISIT (OUTPATIENT)
Dept: UROLOGY | Facility: MEDICAL CENTER | Age: 69
End: 2024-07-18
Payer: MEDICARE

## 2024-07-18 DIAGNOSIS — C79.51 PROSTATE CANCER METASTATIC TO BONE (HCC): ICD-10-CM

## 2024-07-18 DIAGNOSIS — C61 PROSTATE CANCER METASTATIC TO BONE (HCC): ICD-10-CM

## 2024-07-18 DIAGNOSIS — R33.9 URINARY RETENTION: Primary | ICD-10-CM

## 2024-07-18 LAB
POC POST-VOID: 129 ML
POC PRE-VOID: NORMAL

## 2024-07-18 PROCEDURE — 51798 US URINE CAPACITY MEASURE: CPT | Performed by: UROLOGY

## 2024-07-18 PROCEDURE — 99212 OFFICE O/P EST SF 10 MIN: CPT | Mod: 25 | Performed by: UROLOGY

## 2024-07-24 DIAGNOSIS — N40.1 BENIGN PROSTATIC HYPERPLASIA WITH URINARY FREQUENCY: ICD-10-CM

## 2024-07-24 DIAGNOSIS — C79.51 PROSTATE CANCER METASTATIC TO BONE (HCC): ICD-10-CM

## 2024-07-24 DIAGNOSIS — C61 PROSTATE CANCER METASTATIC TO BONE (HCC): ICD-10-CM

## 2024-07-24 DIAGNOSIS — R35.0 BENIGN PROSTATIC HYPERPLASIA WITH URINARY FREQUENCY: ICD-10-CM

## 2024-07-24 RX ORDER — BICALUTAMIDE 50 MG/1
50 TABLET, FILM COATED ORAL DAILY
Qty: 90 TABLET | Refills: 0 | Status: SHIPPED | OUTPATIENT
Start: 2024-07-24

## 2024-07-24 RX ORDER — BICALUTAMIDE 50 MG/1
50 TABLET, FILM COATED ORAL DAILY
Qty: 90 TABLET | Refills: 1 | OUTPATIENT
Start: 2024-07-24

## 2024-07-25 ENCOUNTER — HOSPITAL ENCOUNTER (OUTPATIENT)
Dept: LAB | Facility: MEDICAL CENTER | Age: 69
End: 2024-07-25
Attending: UROLOGY
Payer: MEDICARE

## 2024-07-25 ENCOUNTER — OFFICE VISIT (OUTPATIENT)
Dept: UROLOGY | Facility: MEDICAL CENTER | Age: 69
End: 2024-07-25
Payer: MEDICARE

## 2024-07-25 ENCOUNTER — HOSPITAL ENCOUNTER (OUTPATIENT)
Dept: RADIOLOGY | Facility: MEDICAL CENTER | Age: 69
End: 2024-07-25
Attending: INTERNAL MEDICINE
Payer: MEDICARE

## 2024-07-25 DIAGNOSIS — R33.9 URINARY RETENTION: ICD-10-CM

## 2024-07-25 DIAGNOSIS — C61 PROSTATE CANCER METASTATIC TO BONE (HCC): ICD-10-CM

## 2024-07-25 DIAGNOSIS — C79.51 PROSTATE CANCER METASTATIC TO BONE (HCC): ICD-10-CM

## 2024-07-25 DIAGNOSIS — N40.1 BENIGN PROSTATIC HYPERPLASIA WITH URINARY FREQUENCY: ICD-10-CM

## 2024-07-25 DIAGNOSIS — R35.0 BENIGN PROSTATIC HYPERPLASIA WITH URINARY FREQUENCY: ICD-10-CM

## 2024-07-25 LAB
ANION GAP SERPL CALC-SCNC: 11 MMOL/L (ref 7–16)
BUN SERPL-MCNC: 24 MG/DL (ref 8–22)
CALCIUM SERPL-MCNC: 9.2 MG/DL (ref 8.5–10.5)
CHLORIDE SERPL-SCNC: 105 MMOL/L (ref 96–112)
CO2 SERPL-SCNC: 24 MMOL/L (ref 20–33)
CREAT SERPL-MCNC: 1.49 MG/DL (ref 0.5–1.4)
GFR SERPLBLD CREATININE-BSD FMLA CKD-EPI: 50 ML/MIN/1.73 M 2
GLUCOSE SERPL-MCNC: 110 MG/DL (ref 65–99)
POC POST-VOID: 415 ML
POC PRE-VOID: NORMAL
POTASSIUM SERPL-SCNC: 4.5 MMOL/L (ref 3.6–5.5)
SODIUM SERPL-SCNC: 140 MMOL/L (ref 135–145)

## 2024-07-25 PROCEDURE — 51798 US URINE CAPACITY MEASURE: CPT | Mod: 91 | Performed by: UROLOGY

## 2024-07-25 PROCEDURE — A9595 CT-PETCT-PROSTATE SCAN SKULL BASE TO MID-THIGH (PYLARIFY OR AXUMIN): HCPCS

## 2024-07-25 PROCEDURE — 80048 BASIC METABOLIC PNL TOTAL CA: CPT

## 2024-07-25 PROCEDURE — 36415 COLL VENOUS BLD VENIPUNCTURE: CPT

## 2024-07-26 ASSESSMENT — LIFESTYLE VARIABLES
SMOKING_STATUS: NO
TOBACCO_USE: NO

## 2024-07-29 ENCOUNTER — HOSPITAL ENCOUNTER (OUTPATIENT)
Dept: LAB | Facility: MEDICAL CENTER | Age: 69
End: 2024-07-29
Attending: INTERNAL MEDICINE
Payer: MEDICARE

## 2024-07-29 ENCOUNTER — HOSPITAL ENCOUNTER (OUTPATIENT)
Dept: RADIATION ONCOLOGY | Facility: MEDICAL CENTER | Age: 69
End: 2024-07-29
Attending: RADIOLOGY
Payer: MEDICARE

## 2024-07-29 VITALS
BODY MASS INDEX: 26.4 KG/M2 | HEART RATE: 81 BPM | TEMPERATURE: 97.3 F | RESPIRATION RATE: 20 BRPM | OXYGEN SATURATION: 96 % | SYSTOLIC BLOOD PRESSURE: 127 MMHG | WEIGHT: 194.67 LBS | DIASTOLIC BLOOD PRESSURE: 87 MMHG

## 2024-07-29 DIAGNOSIS — C61 PROSTATE CANCER METASTATIC TO BONE (HCC): ICD-10-CM

## 2024-07-29 DIAGNOSIS — C79.51 PROSTATE CANCER METASTATIC TO BONE (HCC): ICD-10-CM

## 2024-07-29 DIAGNOSIS — R35.0 BENIGN PROSTATIC HYPERPLASIA WITH URINARY FREQUENCY: ICD-10-CM

## 2024-07-29 DIAGNOSIS — N40.1 BENIGN PROSTATIC HYPERPLASIA WITH URINARY FREQUENCY: ICD-10-CM

## 2024-07-29 LAB
ALBUMIN SERPL BCP-MCNC: 4.2 G/DL (ref 3.2–4.9)
ALBUMIN/GLOB SERPL: 1.4 G/DL
ALP SERPL-CCNC: 123 U/L (ref 30–99)
ALT SERPL-CCNC: 18 U/L (ref 2–50)
ANION GAP SERPL CALC-SCNC: 15 MMOL/L (ref 7–16)
AST SERPL-CCNC: 15 U/L (ref 12–45)
BASOPHILS # BLD AUTO: 0.5 % (ref 0–1.8)
BASOPHILS # BLD: 0.04 K/UL (ref 0–0.12)
BILIRUB SERPL-MCNC: 0.5 MG/DL (ref 0.1–1.5)
BUN SERPL-MCNC: 24 MG/DL (ref 8–22)
CALCIUM ALBUM COR SERPL-MCNC: 9.6 MG/DL (ref 8.5–10.5)
CALCIUM SERPL-MCNC: 9.8 MG/DL (ref 8.5–10.5)
CHLORIDE SERPL-SCNC: 102 MMOL/L (ref 96–112)
CO2 SERPL-SCNC: 25 MMOL/L (ref 20–33)
CREAT SERPL-MCNC: 1.5 MG/DL (ref 0.5–1.4)
EOSINOPHIL # BLD AUTO: 0.29 K/UL (ref 0–0.51)
EOSINOPHIL NFR BLD: 3.3 % (ref 0–6.9)
ERYTHROCYTE [DISTWIDTH] IN BLOOD BY AUTOMATED COUNT: 41.8 FL (ref 35.9–50)
GFR SERPLBLD CREATININE-BSD FMLA CKD-EPI: 50 ML/MIN/1.73 M 2
GLOBULIN SER CALC-MCNC: 3 G/DL (ref 1.9–3.5)
GLUCOSE SERPL-MCNC: 98 MG/DL (ref 65–99)
HCT VFR BLD AUTO: 44.3 % (ref 42–52)
HGB BLD-MCNC: 15 G/DL (ref 14–18)
IMM GRANULOCYTES # BLD AUTO: 0.03 K/UL (ref 0–0.11)
IMM GRANULOCYTES NFR BLD AUTO: 0.3 % (ref 0–0.9)
LYMPHOCYTES # BLD AUTO: 1.83 K/UL (ref 1–4.8)
LYMPHOCYTES NFR BLD: 21.1 % (ref 22–41)
MCH RBC QN AUTO: 32.2 PG (ref 27–33)
MCHC RBC AUTO-ENTMCNC: 33.9 G/DL (ref 32.3–36.5)
MCV RBC AUTO: 95.1 FL (ref 81.4–97.8)
MONOCYTES # BLD AUTO: 1.07 K/UL (ref 0–0.85)
MONOCYTES NFR BLD AUTO: 12.3 % (ref 0–13.4)
NEUTROPHILS # BLD AUTO: 5.43 K/UL (ref 1.82–7.42)
NEUTROPHILS NFR BLD: 62.5 % (ref 44–72)
NRBC # BLD AUTO: 0 K/UL
NRBC BLD-RTO: 0 /100 WBC (ref 0–0.2)
PLATELET # BLD AUTO: 421 K/UL (ref 164–446)
PMV BLD AUTO: 10.1 FL (ref 9–12.9)
POTASSIUM SERPL-SCNC: 4.6 MMOL/L (ref 3.6–5.5)
PROT SERPL-MCNC: 7.2 G/DL (ref 6–8.2)
RBC # BLD AUTO: 4.66 M/UL (ref 4.7–6.1)
SODIUM SERPL-SCNC: 142 MMOL/L (ref 135–145)
WBC # BLD AUTO: 8.7 K/UL (ref 4.8–10.8)

## 2024-07-29 PROCEDURE — 99214 OFFICE O/P EST MOD 30 MIN: CPT | Performed by: RADIOLOGY

## 2024-07-29 PROCEDURE — 84153 ASSAY OF PSA TOTAL: CPT

## 2024-07-29 PROCEDURE — 80053 COMPREHEN METABOLIC PANEL: CPT

## 2024-07-29 PROCEDURE — 36415 COLL VENOUS BLD VENIPUNCTURE: CPT

## 2024-07-29 PROCEDURE — 85025 COMPLETE CBC W/AUTO DIFF WBC: CPT

## 2024-07-29 RX ORDER — MULTIVIT WITH MINERALS/LUTEIN
500 TABLET ORAL DAILY
COMMUNITY

## 2024-07-29 ASSESSMENT — PAIN SCALES - GENERAL: PAINLEVEL: NO PAIN

## 2024-07-30 LAB — PSA SERPL-MCNC: 67.9 NG/ML (ref 0–4)

## 2024-07-31 ENCOUNTER — PATIENT OUTREACH (OUTPATIENT)
Dept: ONCOLOGY | Facility: MEDICAL CENTER | Age: 69
End: 2024-07-31
Payer: MEDICARE

## 2024-08-01 ENCOUNTER — APPOINTMENT (OUTPATIENT)
Dept: HEMATOLOGY ONCOLOGY | Facility: MEDICAL CENTER | Age: 69
End: 2024-08-01
Payer: MEDICARE

## 2024-08-01 ENCOUNTER — TELEPHONE (OUTPATIENT)
Dept: HEMATOLOGY ONCOLOGY | Facility: MEDICAL CENTER | Age: 69
End: 2024-08-01

## 2024-08-01 VITALS
DIASTOLIC BLOOD PRESSURE: 68 MMHG | HEART RATE: 79 BPM | TEMPERATURE: 97.9 F | SYSTOLIC BLOOD PRESSURE: 102 MMHG | OXYGEN SATURATION: 99 % | WEIGHT: 193.23 LBS | HEIGHT: 72 IN | RESPIRATION RATE: 12 BRPM | BODY MASS INDEX: 26.17 KG/M2

## 2024-08-01 DIAGNOSIS — C79.51 PROSTATE CANCER METASTATIC TO BONE (HCC): ICD-10-CM

## 2024-08-01 DIAGNOSIS — C61 PROSTATE CANCER METASTATIC TO BONE (HCC): ICD-10-CM

## 2024-08-01 PROCEDURE — 99212 OFFICE O/P EST SF 10 MIN: CPT | Performed by: INTERNAL MEDICINE

## 2024-08-01 PROCEDURE — RXMED WILLOW AMBULATORY MEDICATION CHARGE: Performed by: INTERNAL MEDICINE

## 2024-08-01 PROCEDURE — 99214 OFFICE O/P EST MOD 30 MIN: CPT | Performed by: INTERNAL MEDICINE

## 2024-08-01 RX ORDER — PREDNISONE 5 MG/1
5 TABLET ORAL DAILY
Qty: 60 TABLET | Refills: 11 | Status: SHIPPED | OUTPATIENT
Start: 2024-08-01 | End: 2024-08-02 | Stop reason: SDUPTHER

## 2024-08-01 RX ORDER — ABIRATERONE ACETATE 250 MG/1
1000 TABLET ORAL DAILY
Qty: 120 TABLET | Refills: 11 | Status: SHIPPED | OUTPATIENT
Start: 2024-08-01 | End: 2024-08-22 | Stop reason: SDUPTHER

## 2024-08-01 ASSESSMENT — PAIN SCALES - GENERAL: PAINLEVEL: NO PAIN

## 2024-08-01 ASSESSMENT — FIBROSIS 4 INDEX: FIB4 SCORE: 0.59

## 2024-08-01 NOTE — TELEPHONE ENCOUNTER
Patient will  his first prescription from [facility] on Monday, 08/05.    Thank you,  Deysi Finch  RX Coordinator I ONC/RA  [phone]

## 2024-08-01 NOTE — PROGRESS NOTES
PROGRESS NOTE : [facility]    8/1/2024    Referring Physician:   Primary Care:  Darling Franklin M.D.    Diagnosis: Metastatic prostate cancer to bone    Chief Complaint: Urinary retention    History of Presenting Illness:  Manuel Quinn is a 69 y.o. male who has a no significant past medical history except kidney stone several years ago.  He has a history of BPH and he presented with a urinary retention on the March 28, 2024.  His initial PSA was 163 and a follow-up PSA was 148 on April 11, 2024.  Patient had the MRI of the prostate on the May 11, 2024 which showed evidence of prostate cancer extending to the seminal vesicle and there was a signs of bilateral ischium bone metastasis.  Eventually, he had a prostate biopsy on June 4, 2024 which showed group 3, [place] 7 (3+4) prostate cancer.  He had a bone scan on May 29, 2024 which showed evidence of bilateral inferior pubic ramus and left posterior rib metastasis.  Patient had a PSMA PET scan on July 20, 2024 which showed evidence of bilateral pubic ramus, right posterior ilium, and a left posterior sacral metastasis.  His creatinine is 1.5 and his GFR is 50. His PSA came down from 148-67.9 on July 29, 2024.  Patient had a Lupron 22.5 mg on July 5, 2024.  He comes for routine follow-up today    Interval History:    7/5/2024 : Lupron 22.5mg  8/1/2024 : Zytiga/prednisone    Past Medical History:   Diagnosis Date    Inguinal hernia     L    Kidney stone        No past surgical history on file.    Family History   Family history unknown: Yes       OB History   No obstetric history on file.       Allergies as of 08/01/2024 - Reviewed 08/01/2024   Allergen Reaction Noted    Eggs or egg-derived products [chicken-derived products] Diarrhea and Vomiting 03/12/2024         Current Outpatient Medications:     Abiraterone Acetate (ZYTIGA) 250 MG Tab, Take 1,000 mg by mouth every day., Disp: 120 Tablet, Rfl: 11    predniSONE (DELTASONE) 5 MG Tab, Take 1  Tablet by mouth every day., Disp: 60 Tablet, Rfl: 11    Ascorbic Acid (VITAMIN C) 1000 MG Tab, Take 500 mg by mouth every day., Disp: , Rfl:     NON SPECIFIED, Supplement called Quercitian, Disp: , Rfl:     bicalutamide (CASODEX) 50 MG Tab, Take 1 Tablet by mouth every day., Disp: 90 Tablet, Rfl: 0    tamsulosin (FLOMAX) 0.4 MG capsule, Take 2 Capsules by mouth 1/2 hour after breakfast for 360 days. Take before bed (not after breakfast), Disp: 180 Capsule, Rfl: 3    Cholecalciferol (VITAMIN D) 2000 UNIT Tab, Take 2,000 Units by mouth every day., Disp: , Rfl:     Zinc 20 MG Cap, Take 20 mg by mouth every day., Disp: , Rfl:     Review of Systems:  Patient denies fever, chills, nausea, vomiting, chest pain, sob, blood in stools, black stool, blood in urine. All systems are reviewed See HPI.  He is complaining of insomnia due to frequency.  He cannot sleep less than 1 hour continuously.  I counseled him that he should take fluid early in the morning but not at 6 PM to decrease frequency and nighttime.       Physical Exam:  /68 (BP Location: Right arm, Patient Position: Sitting, BP Cuff Size: Adult)   Pulse 79   Temp 36.6 °C (97.9 °F) (Temporal)   Resp 12   Ht 1.829 m (6')   Wt 87.6 kg (193 lb 3.7 oz)   SpO2 99%  Body mass index is 26.21 kg/m².  Constitutional:  NAD, well appearing.  HEENT:   SIERRA EOMI  Cardiovascular: RRR.   No m/r/g. No carotid bruits.       Lungs:   Clear, no wheezing, no rales, no respiratory distress.  Abdomen: Soft, non-tender. + BS, no masses, no suprapubic tenderness, no hepatomegaly.  Extremities:  No pedal edema. Bilateral and radial pulses present.  Skin:  Warm and dry.    Neurologic: Alert & oriented x 3, CN II-XII grossly intact, strength and sensation grossly intact.  No focal deficits noted.  Psychiatric:  Affect normal, mood normal, judgment normal.      Labs:  Hospital Outpatient Visit on 06/26/2024   Component Date Value Ref Range Status    Sodium 06/26/2024 138  135 - 145  mmol/L Final    Potassium 06/26/2024 4.1  3.6 - 5.5 mmol/L Final    Chloride 06/26/2024 102  96 - 112 mmol/L Final    Co2 06/26/2024 23  20 - 33 mmol/L Final    Anion Gap 06/26/2024 13.0  7.0 - 16.0 Final    Glucose 06/26/2024 117 (H)  65 - 99 mg/dL Final    Bun 06/26/2024 32 (H)  8 - 22 mg/dL Final    Creatinine 06/26/2024 1.49 (H)  0.50 - 1.40 mg/dL Final    Calcium 06/26/2024 9.6  8.5 - 10.5 mg/dL Final    Correct Calcium 06/26/2024 9.6  8.5 - 10.5 mg/dL Final    AST(SGOT) 06/26/2024 16  12 - 45 U/L Final    ALT(SGPT) 06/26/2024 23  2 - 50 U/L Final    Alkaline Phosphatase 06/26/2024 103 (H)  30 - 99 U/L Final    Total Bilirubin 06/26/2024 0.3  0.1 - 1.5 mg/dL Final    Albumin 06/26/2024 4.0  3.2 - 4.9 g/dL Final    Total Protein 06/26/2024 7.1  6.0 - 8.2 g/dL Final    Globulin 06/26/2024 3.1  1.9 - 3.5 g/dL Final    A-G Ratio 06/26/2024 1.3  g/dL Final    Significant Indicator 06/26/2024 NEG   Final    Source 06/26/2024 UR   Final    Site 06/26/2024 -   Final    Culture Result 06/26/2024 No growth at 48 hours.   Final    Color 06/26/2024 Yellow   Final    Character 06/26/2024 Cloudy (A)   Final    Specific Gravity 06/26/2024 1.024  <1.035 Final    Ph 06/26/2024 5.5  5.0 - 8.0 Final    Glucose 06/26/2024 Negative  Negative mg/dL Final    Ketones 06/26/2024 Negative  Negative mg/dL Final    Protein 06/26/2024 30 (A)  Negative mg/dL Final    Bilirubin 06/26/2024 Negative  Negative Final    Urobilinogen, Urine 06/26/2024 0.2  Negative Final    Nitrite 06/26/2024 Negative  Negative Final    Leukocyte Esterase 06/26/2024 Negative  Negative Final    Occult Blood 06/26/2024 Small (A)  Negative Final    Micro Urine Req 06/26/2024 Microscopic   Final    WBC 06/26/2024 2-5 (A)  /hpf Final    Comment: Female  <12 Yr 0-2  >12 Yr 0-5  Male   None      RBC 06/26/2024 10-20 (A)  /hpf Final    Comment: Female  >12 Yr 0-2  Male   None      Bacteria 06/26/2024 Negative  None /hpf Final    Epithelial Cells 06/26/2024 Negative  /hpf  Final    Ca Oxalate Crystal 06/26/2024 Many  /hpf Final    Hyaline Cast 06/26/2024 0-2  /lpf Final    GFR (CKD-EPI) 06/26/2024 50 (A)  >60 mL/min/1.73 m 2 Final    Comment: Estimated Glomerular Filtration Rate is calculated using  race neutral CKD-EPI 2021 equation per NKF-ASN recommendations.         Imaging:   All listed images below have been independently reviewed by me. I agree with the findings as summarized below:    US-RENAL    Result Date: 6/26/2024  The right kidney measures 10.69 cm.  The right kidney appears normal in contour and parenchymal echotexture. The corticomedullary differentiation is preserved. The right renal collecting system is not dilated. No hydronephrosis. There are no renal calculi. There is a 1.8 x 1.9 x 1.7 cm simple right renal cyst. No follow-up recommended for this finding. The left kidney measures 11.82 cm. The left kidney appears normal in contour and parenchymal echotexture. The corticomedullary differentiation is preserved. The left renal collecting system is not dilated. No hydronephrosis. There are no renal calculi. The bladder demonstrates no focal wall abnormality.         Assessment & Plan:    Metastatic prostate cancer to the bone, T3bM1.    Patient had Lupron 22.5 mg on July 5, 2024 and he will have every 12 weeks indefinitely. Casodex 50 mg p.o. daily for 1 month will be discontinued .  I explained to him in detail regarding prognosis and management of metastatic prostate cancer to the bone one more time.  His [place] score is 7 and the average patient will live for 5 to 6 years from date of diagnosis.  Patient was seen by radiation oncology and he will have a radiation treatment to the prostate and metastatic area in the future around October of this year.  I discussed with him regarding benefit and the side effects of Zytiga 1000 mg p.o. daily and prednisone 5 mg twice a day in detail.  Zytiga is relatively well-tolerated and the prednisone is a replacement dose.  He  will continue Zytiga and prednisone until he progresses in the future.  I will follow him in 4 weeks with a follow-up CBC, CMP, and a PSA for continuous care.  I recommended for the patient to call me if there is any problem in between.  His main issue currently is a frequency and that he is being followed by urology.    Any questions and concerns raised by the patient were answered to the best of my ability.     Thank you for allowing me to participate in the care for this patient. Please feel free to contact me for any questions or concerns.     Total time spent on chart review, clinic encounter, and documentation: 30 minutes.

## 2024-08-01 NOTE — TELEPHONE ENCOUNTER
Prior Authorization for Abiraterone Acetate 250MG tablets (Quantity: 120, Days: 30) has been submitted via Cover My Meds: Key ( [id number])    Insurance: [employer]     Will follow up in 24-48 business hours.

## 2024-08-01 NOTE — TELEPHONE ENCOUNTER
Prior Authorization for Abiraterone Acetate 250MG tablets  has been approved for a quantity of 120 , day supply 30    Prior Authorization reference number: [id number]  Insurance: [employer]  Effective dates: 01/01/2024 to 12/31/2024  Copay: $683.49     Is patient eligible to fill with [facility]? No, test claim rejected stating insurance lockout.    I spoke with insurance and 120 tablets will be $683.49 for his first fill due to deductible. Once he meets his deductible, his copay will be 29% of the cash price of the drug which would be $510/mo. I will contact [facility] for better pricing.

## 2024-08-02 ENCOUNTER — PHARMACY VISIT (OUTPATIENT)
Dept: PHARMACY | Facility: MEDICAL CENTER | Age: 69
End: 2024-08-02
Payer: COMMERCIAL

## 2024-08-02 ENCOUNTER — DOCUMENTATION (OUTPATIENT)
Dept: PHARMACY | Facility: MEDICAL CENTER | Age: 69
End: 2024-08-02
Payer: MEDICARE

## 2024-08-02 DIAGNOSIS — C61 PROSTATE CANCER METASTATIC TO BONE (HCC): ICD-10-CM

## 2024-08-02 DIAGNOSIS — C79.51 PROSTATE CANCER METASTATIC TO BONE (HCC): ICD-10-CM

## 2024-08-02 RX ORDER — PREDNISONE 5 MG/1
5 TABLET ORAL 2 TIMES DAILY
Qty: 60 TABLET | Refills: 11 | Status: SHIPPED | OUTPATIENT
Start: 2024-08-02

## 2024-08-12 ENCOUNTER — OFFICE VISIT (OUTPATIENT)
Dept: UROLOGY | Facility: MEDICAL CENTER | Age: 69
End: 2024-08-12
Payer: MEDICARE

## 2024-08-12 DIAGNOSIS — R33.9 URINARY RETENTION: ICD-10-CM

## 2024-08-12 LAB
POC POST-VOID: 276 ML
POC PRE-VOID: NORMAL

## 2024-08-12 PROCEDURE — 51798 US URINE CAPACITY MEASURE: CPT | Performed by: UROLOGY

## 2024-08-12 PROCEDURE — 99213 OFFICE O/P EST LOW 20 MIN: CPT | Performed by: UROLOGY

## 2024-08-12 NOTE — PROGRESS NOTES
Chief Complaint: urinary retention, prostate cancer    HPI: Manuel Quinn is a 69 y.o. male with a history of metastatic castrate sensitive prostate cancer, with urinary retention following a prostate biopsy leading to hydronephrosis and acute kidney injury, here today for follow up.     He feels well overall, though he's noticed that he becomes more easily fatigued now that he's on ADT.     He is voiding without difficulty, but does have bothersome nocturia and some daytime frequency. No gross hematuria or dysuria.     Past Medical History:  Past Medical History:   Diagnosis Date    Inguinal hernia     L    Kidney stone        Past Surgical History:  No past surgical history on file.    Family History:  Family History   Family history unknown: Yes       Social History:  Social History     Socioeconomic History    Marital status:      Spouse name: Not on file    Number of children: Not on file    Years of education: Not on file    Highest education level: Not on file   Occupational History    Not on file   Tobacco Use    Smoking status: Never    Smokeless tobacco: Never   Vaping Use    Vaping status: Never Used   Substance and Sexual Activity    Alcohol use: Yes     Comment: occ    Drug use: Never    Sexual activity: Not on file   Other Topics Concern    Not on file   Social History Narrative    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 Lives in [facility] [place] with his wife Rashmi.      Social Determinants of Health     Financial Resource Strain: Not on file   Food Insecurity: Not on file   Transportation Needs: Not on file   Physical Activity: Not on file   Stress: Not on file   Social Connections: Not on file   Intimate Partner Violence: Not on file   Housing Stability: Not on file       Medications:  Current Outpatient Medications   Medication Sig Dispense Refill    predniSONE (DELTASONE) 5 MG Tab Take 1 Tablet by mouth 2 times a day. 60 Tablet 11    Abiraterone Acetate (ZYTIGA) 250 MG Tab Take 4  tablets (1,000 mg) by mouth every day. 120 Tablet 11    Ascorbic Acid (VITAMIN C) 1000 MG Tab Take 500 mg by mouth every day.      NON SPECIFIED Supplement called Quercitian      bicalutamide (CASODEX) 50 MG Tab Take 1 Tablet by mouth every day. 90 Tablet 0    tamsulosin (FLOMAX) 0.4 MG capsule Take 2 Capsules by mouth 1/2 hour after breakfast for 360 days. Take before bed (not after breakfast) 180 Capsule 3    Cholecalciferol (VITAMIN D) 2000 UNIT Tab Take 2,000 Units by mouth every day.      Zinc 20 MG Cap Take 20 mg by mouth every day.       No current facility-administered medications for this visit.       Allergies:  Allergies   Allergen Reactions    Eggs Or Egg-Derived Products [Chicken-Derived Products] Diarrhea and Vomiting     25 years       Review of Systems:  Constitutional: Negative for fever, chills and malaise/fatigue.   HENT: Negative for congestion.    Eyes: Negative for pain.   Respiratory: Negative for cough and shortness of breath.    Cardiovascular: Negative for leg swelling.   Gastrointestinal: Negative for nausea, vomiting, abdominal pain and diarrhea.   Genitourinary: Negative for dysuria and hematuria.   Skin: Negative for rash.   Neurological: Negative for dizziness, focal weakness and headaches.   Endo/Heme/Allergies: Does not bruise/bleed easily.   Psychiatric/Behavioral: Negative for depression.  The patient is not nervous/anxious.        Physical Exam:  There were no vitals filed for this visit.    GENERAL: well appearing, well nourished, NAD  RESP: respiratory effort normal  SKIN/LYMPH: normal coloration and turgor, no suspicious skin lesions noted  NEURO/PSYCH: alert, oriented, normal speech, no focal findings or movement disorder noted  EXTREMITIES: no pedal edema noted    Data Review:    Labs:  POCT UA   Lab Results   Component Value Date/Time    POCCOLOR yellow 06/04/2024 10:48 AM    POCAPPEAR clear 06/04/2024 10:48 AM    POCLEUKEST neg 06/04/2024 10:48 AM    POCNITRITE neg  06/04/2024 10:48 AM    POCUROBILIGE 0.2 06/04/2024 10:48 AM    POCPROTEIN neg 06/04/2024 10:48 AM    POCURPH 6.0 06/04/2024 10:48 AM    POCBLOOD neg 06/04/2024 10:48 AM    POCSPGRV 1.010 06/04/2024 10:48 AM    POCKETONES neg 06/04/2024 10:48 AM    POCBILIRUBIN neg 06/04/2024 10:48 AM    POCGLUCUA neg 06/04/2024 10:48 AM      CBC   Lab Results   Component Value Date/Time    WBC 8.7 07/29/2024 1030    RBC 4.66 (L) 07/29/2024 1030    HEMOGLOBIN 15.0 07/29/2024 1030    HEMATOCRIT 44.3 07/29/2024 1030    MCV 95.1 07/29/2024 1030    MCH 32.2 07/29/2024 1030    MCHC 33.9 07/29/2024 1030    RDW 41.8 07/29/2024 1030    MPV 10.1 07/29/2024 1030    LYMPHOCYTES 21.10 (L) 07/29/2024 1030    LYMPHS 1.83 07/29/2024 1030    MONOCYTES 12.30 07/29/2024 1030    MONOS 1.07 (H) 07/29/2024 1030    EOSINOPHILS 3.30 07/29/2024 1030    EOS 0.29 07/29/2024 1030    BASOPHILS 0.50 07/29/2024 1030    BASO 0.04 07/29/2024 1030    NRBC 0.00 07/29/2024 1030       CMP   Lab Results   Component Value Date/Time    SODIUM 142 07/29/2024 1030    POTASSIUM 4.6 07/29/2024 1030    CHLORIDE 102 07/29/2024 1030    CO2 25 07/29/2024 1030    ANION 15.0 07/29/2024 1030    GLUCOSE 98 07/29/2024 1030    BUN 24 (H) 07/29/2024 1030    CREATININE 1.50 (H) 07/29/2024 1030    GFRCKD 50 (A) 07/29/2024 1030    CALCIUM 9.8 07/29/2024 1030    CORRCALC 9.6 07/29/2024 1030    ASTSGOT 15 07/29/2024 1030    ALTSGPT 18 07/29/2024 1030    ALKPHOSPHAT 123 (H) 07/29/2024 1030    TBILIRUBIN 0.5 07/29/2024 1030    ALBUMIN 4.2 07/29/2024 1030    TOTPROTEIN 7.2 07/29/2024 1030    GLOBULIN 3.0 07/29/2024 1030    AGRATIO 1.4 07/29/2024 1030       Imaging:   US-RENAL  Order: [id number]   Status: Final result       Visible to patient: Yes (seen)       Next appt: 08/29/2024 at 09:30 AM in [facility] (Magdaleno Yang M.D.)       Dx: Hydronephrosis, unspecified hydroneph...    0 Result Notes  Details    Reading Physician Reading Date Result Priority   Steven Christian M.D.  [phone]  6/26/2024      Narrative & Impression     6/26/2024 3:40 PM     HISTORY/REASON FOR EXAM:  hydronephrosis on bone scan; please assess severity        TECHNIQUE/EXAM DESCRIPTION:  Renal ultrasound.     COMPARISON:  None     FINDINGS:     The right kidney measures 10.69 cm.  The right kidney appears normal in contour and parenchymal echotexture. The corticomedullary differentiation is preserved. The right renal collecting system is not dilated. No hydronephrosis. There are no renal   calculi. There is a 1.8 x 1.9 x 1.7 cm simple right renal cyst. No follow-up recommended for this finding.     The left kidney measures 11.82 cm. The left kidney appears normal in contour and parenchymal echotexture. The corticomedullary differentiation is preserved. The left renal collecting system is not dilated. No hydronephrosis. There are no renal calculi.     The bladder demonstrates no focal wall abnormality.           IMPRESSION:     Right simple renal cyst for which no further imaging is recommended, otherwise unremarkable renal ultrasound.               Assessment: 69 y.o.male with a history of urinary retention following prostate biopsy, now voiding spontaneously for the last month. His PVR is moderately elevated but he is comfortable, and follow up renal ultrasound and labs demonstrate normal renal function. He is undergoing therapy for metastatic castrate sensitive prostate cancer.    We did discuss dietary and fluid management recommendations to help limit nocturia.      Plan:    -Fluid restriction for 2-3 hours before bedtime  -Avoid bladder irritants (coffee, tea, chocolate, carbonated beverages, alcohol, spicy foods) in the late afternoon  -Follow up in 3 months    Miguel Austin MD

## 2024-08-14 ENCOUNTER — DOCUMENTATION (OUTPATIENT)
Dept: PHARMACY | Facility: MEDICAL CENTER | Age: 69
End: 2024-08-14
Payer: MEDICARE

## 2024-08-14 NOTE — PROGRESS NOTES
First Cycle   Dx: Prostate cancer metastatic to bone [C61, C79.51]      Txt review: Abiraterone 1000mg ([id number] tablets) by mouth once daily. + [Prednisone 5mg by mouth twice daily + Leuprolide every 3 months]    Administration: taking Abiraterone #4 tablets by mouth daily in the morning; taking between 6-6:30am; swallowing tablets whole; taking on emtpy stomach then waiting 1 hour before eating  Adherence: no missed doses     Missed dose mgmt: N/A as no missed doses   Current SE: none     Mitigation/Mgmt: N/A as no reported SE       List Changes to Allergies, Diagnoses: none reported       Clinically Relevant, Abnormal Labs: no new labs      Med Rec/Updated drug list:   No changes since last assessment, reviewed with patient.      Additional: I had a brief yet pleasant conversation with Benedict for first cycle check-in on recenlty started Abiraterone. Benedict confirmed starting 8/4/24 and has been taking as prescribed with no missed doses or side effects. He met with urology provider 8/12 and was given dietary/fluid management suggestions to help manage nocturia. He was appreciative of the call to check-in and had no further questions.   **Reviewed deferred Wellness/Lifestyle Counseling from initial assessment**  Benedict noted he continues eating well and eating regularly with no impact on oral intake since starting Abiraterone. He feels he has a good routine down by taking Abiraterone in the morning then he can eat as he desires for the day starting 1 hour after Abiraterone administration.

## 2024-08-22 DIAGNOSIS — C79.51 PROSTATE CANCER METASTATIC TO BONE (HCC): ICD-10-CM

## 2024-08-22 DIAGNOSIS — C61 PROSTATE CANCER METASTATIC TO BONE (HCC): ICD-10-CM

## 2024-08-22 RX ORDER — ABIRATERONE ACETATE 250 MG/1
1000 TABLET ORAL DAILY
Qty: 120 TABLET | Refills: 11 | Status: SHIPPED | OUTPATIENT
Start: 2024-08-22 | End: 2024-08-29 | Stop reason: SDUPTHER

## 2024-08-26 ENCOUNTER — HOSPITAL ENCOUNTER (OUTPATIENT)
Dept: LAB | Facility: MEDICAL CENTER | Age: 69
End: 2024-08-26
Attending: INTERNAL MEDICINE
Payer: MEDICARE

## 2024-08-26 DIAGNOSIS — C61 PROSTATE CANCER METASTATIC TO BONE (HCC): ICD-10-CM

## 2024-08-26 DIAGNOSIS — C79.51 PROSTATE CANCER METASTATIC TO BONE (HCC): ICD-10-CM

## 2024-08-26 LAB
ALBUMIN SERPL BCP-MCNC: 4.1 G/DL (ref 3.2–4.9)
ALBUMIN/GLOB SERPL: 1.3 G/DL
ALP SERPL-CCNC: 92 U/L (ref 30–99)
ALT SERPL-CCNC: 17 U/L (ref 2–50)
ANION GAP SERPL CALC-SCNC: 13 MMOL/L (ref 7–16)
AST SERPL-CCNC: 15 U/L (ref 12–45)
BASOPHILS # BLD AUTO: 0.5 % (ref 0–1.8)
BASOPHILS # BLD: 0.05 K/UL (ref 0–0.12)
BILIRUB SERPL-MCNC: 0.5 MG/DL (ref 0.1–1.5)
BUN SERPL-MCNC: 23 MG/DL (ref 8–22)
CALCIUM ALBUM COR SERPL-MCNC: 9.3 MG/DL (ref 8.5–10.5)
CALCIUM SERPL-MCNC: 9.4 MG/DL (ref 8.4–10.2)
CHLORIDE SERPL-SCNC: 101 MMOL/L (ref 96–112)
CO2 SERPL-SCNC: 25 MMOL/L (ref 20–33)
CREAT SERPL-MCNC: 1.18 MG/DL (ref 0.5–1.4)
EOSINOPHIL # BLD AUTO: 0.43 K/UL (ref 0–0.51)
EOSINOPHIL NFR BLD: 4.2 % (ref 0–6.9)
ERYTHROCYTE [DISTWIDTH] IN BLOOD BY AUTOMATED COUNT: 43.8 FL (ref 35.9–50)
GFR SERPLBLD CREATININE-BSD FMLA CKD-EPI: 66 ML/MIN/1.73 M 2
GLOBULIN SER CALC-MCNC: 3.1 G/DL (ref 1.9–3.5)
GLUCOSE SERPL-MCNC: 130 MG/DL (ref 65–99)
HCT VFR BLD AUTO: 43.6 % (ref 42–52)
HGB BLD-MCNC: 15 G/DL (ref 14–18)
IMM GRANULOCYTES # BLD AUTO: 0.04 K/UL (ref 0–0.11)
IMM GRANULOCYTES NFR BLD AUTO: 0.4 % (ref 0–0.9)
LYMPHOCYTES # BLD AUTO: 1.35 K/UL (ref 1–4.8)
LYMPHOCYTES NFR BLD: 13.2 % (ref 22–41)
MCH RBC QN AUTO: 32.3 PG (ref 27–33)
MCHC RBC AUTO-ENTMCNC: 34.4 G/DL (ref 32.3–36.5)
MCV RBC AUTO: 93.8 FL (ref 81.4–97.8)
MONOCYTES # BLD AUTO: 0.73 K/UL (ref 0–0.85)
MONOCYTES NFR BLD AUTO: 7.2 % (ref 0–13.4)
NEUTROPHILS # BLD AUTO: 7.6 K/UL (ref 1.82–7.42)
NEUTROPHILS NFR BLD: 74.5 % (ref 44–72)
NRBC # BLD AUTO: 0 K/UL
NRBC BLD-RTO: 0 /100 WBC (ref 0–0.2)
PLATELET # BLD AUTO: 303 K/UL (ref 164–446)
PMV BLD AUTO: 9.8 FL (ref 9–12.9)
POTASSIUM SERPL-SCNC: 4.2 MMOL/L (ref 3.6–5.5)
PROT SERPL-MCNC: 7.2 G/DL (ref 6–8.2)
PSA SERPL-MCNC: 29.42 NG/ML (ref 0–4)
RBC # BLD AUTO: 4.65 M/UL (ref 4.7–6.1)
SODIUM SERPL-SCNC: 139 MMOL/L (ref 135–145)
WBC # BLD AUTO: 10.2 K/UL (ref 4.8–10.8)

## 2024-08-26 PROCEDURE — 80053 COMPREHEN METABOLIC PANEL: CPT

## 2024-08-26 PROCEDURE — 85025 COMPLETE CBC W/AUTO DIFF WBC: CPT

## 2024-08-26 PROCEDURE — 36415 COLL VENOUS BLD VENIPUNCTURE: CPT

## 2024-08-26 PROCEDURE — 84153 ASSAY OF PSA TOTAL: CPT

## 2024-08-29 ENCOUNTER — HOSPITAL ENCOUNTER (OUTPATIENT)
Dept: HEMATOLOGY ONCOLOGY | Facility: MEDICAL CENTER | Age: 69
End: 2024-08-29
Attending: INTERNAL MEDICINE
Payer: MEDICARE

## 2024-08-29 VITALS
OXYGEN SATURATION: 98 % | SYSTOLIC BLOOD PRESSURE: 120 MMHG | HEIGHT: 72 IN | BODY MASS INDEX: 26.99 KG/M2 | DIASTOLIC BLOOD PRESSURE: 74 MMHG | WEIGHT: 199.3 LBS | TEMPERATURE: 97.3 F | HEART RATE: 81 BPM

## 2024-08-29 DIAGNOSIS — C61 PROSTATE CANCER METASTATIC TO BONE (HCC): ICD-10-CM

## 2024-08-29 DIAGNOSIS — C79.51 PROSTATE CANCER METASTATIC TO BONE (HCC): ICD-10-CM

## 2024-08-29 PROCEDURE — 99212 OFFICE O/P EST SF 10 MIN: CPT | Performed by: INTERNAL MEDICINE

## 2024-08-29 RX ORDER — ABIRATERONE ACETATE 250 MG/1
1000 TABLET ORAL DAILY
Qty: 120 TABLET | Refills: 11 | Status: SHIPPED | OUTPATIENT
Start: 2024-08-29

## 2024-08-29 RX ORDER — TAMSULOSIN HYDROCHLORIDE 0.4 MG/1
0.8 CAPSULE ORAL
Qty: 180 CAPSULE | Refills: 3 | Status: SHIPPED | OUTPATIENT
Start: 2024-08-29 | End: 2025-08-24

## 2024-08-29 ASSESSMENT — FIBROSIS 4 INDEX: FIB4 SCORE: 0.84

## 2024-08-29 NOTE — PROGRESS NOTES
PROGRESS NOTE : [facility]    8/29/2024    Referring Physician:   Primary Care:  Darling Franklin M.D.    Diagnosis: Metastatic prostate cancer to bone    Chief Complaint: Urinary retention    History of Presenting Illness:  Manuel Quinn is a 69 y.o. male who has a no significant past medical history except kidney stone several years ago.  He has a history of BPH and he presented with a urinary retention on the March 28, 2024.  His initial PSA was 163 and a follow-up PSA was 148 on April 11, 2024.  Patient had the MRI of the prostate on the May 11, 2024 which showed evidence of prostate cancer extending to the seminal vesicle and there was a signs of bilateral ischium bone metastasis.  Eventually, he had a prostate biopsy on June 4, 2024 which showed group 3, [place] 7 (3+4) prostate cancer.  He had a bone scan on May 29, 2024 which showed evidence of bilateral inferior pubic ramus and left posterior rib metastasis.  Patient had a PSMA PET scan on July 20, 2024 which showed evidence of bilateral pubic ramus, right posterior ilium, and a left posterior sacral metastasis.  His creatinine is 1.5 and his GFR is 50. His PSA came down from 148-67.9 on July 29, 2024.  Patient had a Lupron 22.5 mg on July 5, 2024.  He comes for routine follow-up today.      Interval History:  7/5/2024 : Lupron 22.5mg  8/1/2024 : Zytiga/prednisone    Past Medical History:   Diagnosis Date    Inguinal hernia     L    Kidney stone        History reviewed. No pertinent surgical history.    Family History   Family history unknown: Yes       OB History   No obstetric history on file.       Allergies as of 08/29/2024 - Reviewed 08/29/2024   Allergen Reaction Noted    Eggs or egg-derived products [chicken-derived products] Diarrhea and Vomiting 03/12/2024         Current Outpatient Medications:     Abiraterone Acetate (ZYTIGA) 250 MG Tab, Take 4 tablets (1,000 mg) by mouth every day., Disp: 120 Tablet, Rfl: 11    tamsulosin  "(FLOMAX) 0.4 MG capsule, Take 2 Capsules by mouth 1/2 hour after breakfast for 360 days. Take before bed (not after breakfast), Disp: 180 Capsule, Rfl: 3    predniSONE (DELTASONE) 5 MG Tab, Take 1 Tablet by mouth 2 times a day., Disp: 60 Tablet, Rfl: 11    Ascorbic Acid (VITAMIN C) 1000 MG Tab, Take 500 mg by mouth every day., Disp: , Rfl:     NON SPECIFIED, Supplement called Quercitian, Disp: , Rfl:     Cholecalciferol (VITAMIN D) 2000 UNIT Tab, Take 2,000 Units by mouth every day., Disp: , Rfl:     Zinc 20 MG Cap, Take 20 mg by mouth every day., Disp: , Rfl:     Review of Systems:  Patient denies fever, chills, nausea, vomiting, chest pain, sob, blood in stools, black stool, blood in urine. All systems are reviewed See HPI.  His frequency decreased significantly after treatment and now, he is waking up every 2 and half hours to urinate.         Physical Exam:  /74 (BP Location: Right arm, Patient Position: Sitting, BP Cuff Size: Adult)   Pulse 81   Temp 36.3 °C (97.3 °F) (Temporal)   Ht 1.829 m (6' 0.01\")   Wt 90.4 kg (199 lb 4.7 oz)   SpO2 98%  Body mass index is 27.02 kg/m².  Constitutional:  NAD, well appearing.  HEENT:   SIERRA EOMI  Cardiovascular: RRR.   No m/r/g. No carotid bruits.       Lungs:   Clear, no wheezing, no rales, no respiratory distress.  Abdomen: Soft, non-tender. + BS, no masses, no suprapubic tenderness, no hepatomegaly.  Extremities:  No pedal edema. Bilateral and radial pulses present.  Skin:  Warm and dry.    Neurologic: Alert & oriented x 3, CN II-XII grossly intact, strength and sensation grossly intact.  No focal deficits noted.  Psychiatric:  Affect normal, mood normal, judgment normal.      Labs:  Hospital Outpatient Visit on 06/26/2024   Component Date Value Ref Range Status    Sodium 06/26/2024 138  135 - 145 mmol/L Final    Potassium 06/26/2024 4.1  3.6 - 5.5 mmol/L Final    Chloride 06/26/2024 102  96 - 112 mmol/L Final    Co2 06/26/2024 23  20 - 33 mmol/L Final    " Anion Gap 06/26/2024 13.0  7.0 - 16.0 Final    Glucose 06/26/2024 117 (H)  65 - 99 mg/dL Final    Bun 06/26/2024 32 (H)  8 - 22 mg/dL Final    Creatinine 06/26/2024 1.49 (H)  0.50 - 1.40 mg/dL Final    Calcium 06/26/2024 9.6  8.5 - 10.5 mg/dL Final    Correct Calcium 06/26/2024 9.6  8.5 - 10.5 mg/dL Final    AST(SGOT) 06/26/2024 16  12 - 45 U/L Final    ALT(SGPT) 06/26/2024 23  2 - 50 U/L Final    Alkaline Phosphatase 06/26/2024 103 (H)  30 - 99 U/L Final    Total Bilirubin 06/26/2024 0.3  0.1 - 1.5 mg/dL Final    Albumin 06/26/2024 4.0  3.2 - 4.9 g/dL Final    Total Protein 06/26/2024 7.1  6.0 - 8.2 g/dL Final    Globulin 06/26/2024 3.1  1.9 - 3.5 g/dL Final    A-G Ratio 06/26/2024 1.3  g/dL Final    Significant Indicator 06/26/2024 NEG   Final    Source 06/26/2024 UR   Final    Site 06/26/2024 -   Final    Culture Result 06/26/2024 No growth at 48 hours.   Final    Color 06/26/2024 Yellow   Final    Character 06/26/2024 Cloudy (A)   Final    Specific Gravity 06/26/2024 1.024  <1.035 Final    Ph 06/26/2024 5.5  5.0 - 8.0 Final    Glucose 06/26/2024 Negative  Negative mg/dL Final    Ketones 06/26/2024 Negative  Negative mg/dL Final    Protein 06/26/2024 30 (A)  Negative mg/dL Final    Bilirubin 06/26/2024 Negative  Negative Final    Urobilinogen, Urine 06/26/2024 0.2  Negative Final    Nitrite 06/26/2024 Negative  Negative Final    Leukocyte Esterase 06/26/2024 Negative  Negative Final    Occult Blood 06/26/2024 Small (A)  Negative Final    Micro Urine Req 06/26/2024 Microscopic   Final    WBC 06/26/2024 2-5 (A)  /hpf Final    Comment: Female  <12 Yr 0-2  >12 Yr 0-5  Male   None      RBC 06/26/2024 10-20 (A)  /hpf Final    Comment: Female  >12 Yr 0-2  Male   None      Bacteria 06/26/2024 Negative  None /hpf Final    Epithelial Cells 06/26/2024 Negative  /hpf Final    Ca Oxalate Crystal 06/26/2024 Many  /hpf Final    Hyaline Cast 06/26/2024 0-2  /lpf Final    GFR (CKD-EPI) 06/26/2024 50 (A)  >60 mL/min/1.73 m 2 Final     Comment: Estimated Glomerular Filtration Rate is calculated using  race neutral CKD-EPI 2021 equation per NKF-ASN recommendations.         Assessment & Plan:    Metastatic prostate cancer to the bone, T3bM1.  Patient had Lupron 22.5 mg on July 5, 2024 and he will have every 12 weeks indefinitely.  He is next Lupron injection will be on October 4, 2024  I explained to him in detail regarding prognosis and management of metastatic prostate cancer to the bone one more time.  His Raton score is 7 and the average patient will live for 5 to 6 years from date of diagnosis.  Patient was seen by radiation oncology and he will have a radiation treatment to the prostate and metastatic area in the future around October of this year. He is taking Zytiga 1000 mg p.o. daily and prednisone 5 mg twice a day very well and there are no significant side effects which will be continued.      I will follow this patient on October 4, 2024 with Lupron injection for continuous care.  His PSA came down from 68 to 29.  He had many questions and I answered to all their questions.  I told him that we will mostly follow his PSA and imaging test that is necessary.     He is having problem with erection and I counseled him.  I recommended for the patient to discuss with urology about that his erection issues.      Thank you for allowing me to participate in the care for this patient. Please feel free to contact me for any questions or concerns.   Total time spent on chart review, clinic encounter, and documentation: 30 minutes.

## 2024-09-15 RX ORDER — 0.9 % SODIUM CHLORIDE 0.9 %
10 VIAL (ML) INJECTION PRN
OUTPATIENT
Start: 2024-10-01

## 2024-09-15 RX ORDER — 0.9 % SODIUM CHLORIDE 0.9 %
3 VIAL (ML) INJECTION PRN
OUTPATIENT
Start: 2024-10-01

## 2024-09-15 RX ORDER — 0.9 % SODIUM CHLORIDE 0.9 %
VIAL (ML) INJECTION PRN
OUTPATIENT
Start: 2024-10-01

## 2024-10-02 ENCOUNTER — HOSPITAL ENCOUNTER (OUTPATIENT)
Dept: LAB | Facility: MEDICAL CENTER | Age: 69
End: 2024-10-02
Attending: INTERNAL MEDICINE
Payer: MEDICARE

## 2024-10-02 DIAGNOSIS — C61 PROSTATE CANCER METASTATIC TO BONE (HCC): ICD-10-CM

## 2024-10-02 DIAGNOSIS — C79.51 PROSTATE CANCER METASTATIC TO BONE (HCC): ICD-10-CM

## 2024-10-02 LAB
ALBUMIN SERPL BCP-MCNC: 4.1 G/DL (ref 3.2–4.9)
ALBUMIN/GLOB SERPL: 1.4 G/DL
ALP SERPL-CCNC: 91 U/L (ref 30–99)
ALT SERPL-CCNC: 23 U/L (ref 2–50)
ANION GAP SERPL CALC-SCNC: 11 MMOL/L (ref 7–16)
AST SERPL-CCNC: 19 U/L (ref 12–45)
BASOPHILS # BLD AUTO: 0.3 % (ref 0–1.8)
BASOPHILS # BLD: 0.02 K/UL (ref 0–0.12)
BILIRUB SERPL-MCNC: 0.5 MG/DL (ref 0.1–1.5)
BUN SERPL-MCNC: 22 MG/DL (ref 8–22)
CALCIUM ALBUM COR SERPL-MCNC: 9.2 MG/DL (ref 8.5–10.5)
CALCIUM SERPL-MCNC: 9.3 MG/DL (ref 8.4–10.2)
CHLORIDE SERPL-SCNC: 101 MMOL/L (ref 96–112)
CO2 SERPL-SCNC: 23 MMOL/L (ref 20–33)
CREAT SERPL-MCNC: 1.01 MG/DL (ref 0.5–1.4)
EOSINOPHIL # BLD AUTO: 0.04 K/UL (ref 0–0.51)
EOSINOPHIL NFR BLD: 0.6 % (ref 0–6.9)
ERYTHROCYTE [DISTWIDTH] IN BLOOD BY AUTOMATED COUNT: 44.6 FL (ref 35.9–50)
FASTING STATUS PATIENT QL REPORTED: NORMAL
GFR SERPLBLD CREATININE-BSD FMLA CKD-EPI: 80 ML/MIN/1.73 M 2
GLOBULIN SER CALC-MCNC: 3 G/DL (ref 1.9–3.5)
GLUCOSE SERPL-MCNC: 156 MG/DL (ref 65–99)
HCT VFR BLD AUTO: 45.3 % (ref 42–52)
HGB BLD-MCNC: 15.7 G/DL (ref 14–18)
IMM GRANULOCYTES # BLD AUTO: 0.02 K/UL (ref 0–0.11)
IMM GRANULOCYTES NFR BLD AUTO: 0.3 % (ref 0–0.9)
LYMPHOCYTES # BLD AUTO: 0.61 K/UL (ref 1–4.8)
LYMPHOCYTES NFR BLD: 8.6 % (ref 22–41)
MCH RBC QN AUTO: 32.9 PG (ref 27–33)
MCHC RBC AUTO-ENTMCNC: 34.7 G/DL (ref 32.3–36.5)
MCV RBC AUTO: 95 FL (ref 81.4–97.8)
MONOCYTES # BLD AUTO: 0.45 K/UL (ref 0–0.85)
MONOCYTES NFR BLD AUTO: 6.3 % (ref 0–13.4)
NEUTROPHILS # BLD AUTO: 5.95 K/UL (ref 1.82–7.42)
NEUTROPHILS NFR BLD: 83.9 % (ref 44–72)
NRBC # BLD AUTO: 0 K/UL
NRBC BLD-RTO: 0 /100 WBC (ref 0–0.2)
PLATELET # BLD AUTO: 265 K/UL (ref 164–446)
PMV BLD AUTO: 9.8 FL (ref 9–12.9)
POTASSIUM SERPL-SCNC: 4.4 MMOL/L (ref 3.6–5.5)
PROT SERPL-MCNC: 7.1 G/DL (ref 6–8.2)
PSA SERPL-MCNC: 4.76 NG/ML (ref 0–4)
RBC # BLD AUTO: 4.77 M/UL (ref 4.7–6.1)
SODIUM SERPL-SCNC: 135 MMOL/L (ref 135–145)
WBC # BLD AUTO: 7.1 K/UL (ref 4.8–10.8)

## 2024-10-02 PROCEDURE — 85025 COMPLETE CBC W/AUTO DIFF WBC: CPT

## 2024-10-02 PROCEDURE — 80053 COMPREHEN METABOLIC PANEL: CPT

## 2024-10-02 PROCEDURE — 36415 COLL VENOUS BLD VENIPUNCTURE: CPT

## 2024-10-02 PROCEDURE — 84153 ASSAY OF PSA TOTAL: CPT

## 2024-10-04 ENCOUNTER — OUTPATIENT INFUSION SERVICES (OUTPATIENT)
Dept: ONCOLOGY | Facility: MEDICAL CENTER | Age: 69
End: 2024-10-04
Attending: INTERNAL MEDICINE
Payer: MEDICARE

## 2024-10-04 ENCOUNTER — HOSPITAL ENCOUNTER (OUTPATIENT)
Dept: HEMATOLOGY ONCOLOGY | Facility: MEDICAL CENTER | Age: 69
End: 2024-10-04
Attending: INTERNAL MEDICINE
Payer: MEDICARE

## 2024-10-04 VITALS
RESPIRATION RATE: 18 BRPM | HEART RATE: 69 BPM | TEMPERATURE: 96.6 F | OXYGEN SATURATION: 99 % | SYSTOLIC BLOOD PRESSURE: 129 MMHG | BODY MASS INDEX: 30.87 KG/M2 | HEIGHT: 70 IN | DIASTOLIC BLOOD PRESSURE: 72 MMHG | WEIGHT: 215.61 LBS

## 2024-10-04 VITALS
HEART RATE: 79 BPM | DIASTOLIC BLOOD PRESSURE: 68 MMHG | SYSTOLIC BLOOD PRESSURE: 102 MMHG | OXYGEN SATURATION: 98 % | BODY MASS INDEX: 27.66 KG/M2 | TEMPERATURE: 97.4 F | WEIGHT: 204 LBS | RESPIRATION RATE: 14 BRPM

## 2024-10-04 DIAGNOSIS — C79.51 PROSTATE CANCER METASTATIC TO BONE (HCC): ICD-10-CM

## 2024-10-04 DIAGNOSIS — C61 PROSTATE CANCER METASTATIC TO BONE (HCC): ICD-10-CM

## 2024-10-04 PROCEDURE — 99212 OFFICE O/P EST SF 10 MIN: CPT | Performed by: INTERNAL MEDICINE

## 2024-10-04 PROCEDURE — 700111 HCHG RX REV CODE 636 W/ 250 OVERRIDE (IP): Mod: JZ,JG | Performed by: NURSE PRACTITIONER

## 2024-10-04 PROCEDURE — 96402 CHEMO HORMON ANTINEOPL SQ/IM: CPT

## 2024-10-04 PROCEDURE — 99214 OFFICE O/P EST MOD 30 MIN: CPT | Performed by: INTERNAL MEDICINE

## 2024-10-04 RX ORDER — PREDNISONE 5 MG/1
5 TABLET ORAL 2 TIMES DAILY
Qty: 180 TABLET | Refills: 3 | Status: SHIPPED | OUTPATIENT
Start: 2024-10-04

## 2024-10-04 RX ADMIN — LEUPROLIDE ACETATE 22.5 MG: KIT at 15:00

## 2024-10-04 ASSESSMENT — FIBROSIS 4 INDEX
FIB4 SCORE: 1.05
FIB4 SCORE: 1.05

## 2024-10-04 ASSESSMENT — PAIN SCALES - GENERAL: PAINLEVEL: NO PAIN

## 2024-10-10 ENCOUNTER — APPOINTMENT (RX ONLY)
Dept: URBAN - METROPOLITAN AREA CLINIC 6 | Facility: CLINIC | Age: 69
Setting detail: DERMATOLOGY
End: 2024-10-10

## 2024-10-10 DIAGNOSIS — D22 MELANOCYTIC NEVI: ICD-10-CM

## 2024-10-10 DIAGNOSIS — L82.1 OTHER SEBORRHEIC KERATOSIS: ICD-10-CM

## 2024-10-10 DIAGNOSIS — L57.0 ACTINIC KERATOSIS: ICD-10-CM

## 2024-10-10 DIAGNOSIS — Z71.89 OTHER SPECIFIED COUNSELING: ICD-10-CM

## 2024-10-10 DIAGNOSIS — D18.0 HEMANGIOMA: ICD-10-CM

## 2024-10-10 DIAGNOSIS — L81.4 OTHER MELANIN HYPERPIGMENTATION: ICD-10-CM

## 2024-10-10 PROBLEM — D22.4 MELANOCYTIC NEVI OF SCALP AND NECK: Status: ACTIVE | Noted: 2024-10-10

## 2024-10-10 PROBLEM — D18.01 HEMANGIOMA OF SKIN AND SUBCUTANEOUS TISSUE: Status: ACTIVE | Noted: 2024-10-10

## 2024-10-10 PROBLEM — D48.5 NEOPLASM OF UNCERTAIN BEHAVIOR OF SKIN: Status: ACTIVE | Noted: 2024-10-10

## 2024-10-10 PROBLEM — D22.5 MELANOCYTIC NEVI OF TRUNK: Status: ACTIVE | Noted: 2024-10-10

## 2024-10-10 PROBLEM — D22.61 MELANOCYTIC NEVI OF RIGHT UPPER LIMB, INCLUDING SHOULDER: Status: ACTIVE | Noted: 2024-10-10

## 2024-10-10 PROBLEM — D22.62 MELANOCYTIC NEVI OF LEFT UPPER LIMB, INCLUDING SHOULDER: Status: ACTIVE | Noted: 2024-10-10

## 2024-10-10 PROCEDURE — ? BIOPSY BY SHAVE METHOD

## 2024-10-10 PROCEDURE — 11102 TANGNTL BX SKIN SINGLE LES: CPT

## 2024-10-10 PROCEDURE — ? SUNSCREEN RECOMMENDATIONS

## 2024-10-10 PROCEDURE — 17000 DESTRUCT PREMALG LESION: CPT | Mod: 59

## 2024-10-10 PROCEDURE — 99213 OFFICE O/P EST LOW 20 MIN: CPT | Mod: 25

## 2024-10-10 PROCEDURE — ? COUNSELING

## 2024-10-10 PROCEDURE — 17003 DESTRUCT PREMALG LES 2-14: CPT

## 2024-10-10 PROCEDURE — 11103 TANGNTL BX SKIN EA SEP/ADDL: CPT

## 2024-10-10 PROCEDURE — ? LIQUID NITROGEN

## 2024-10-10 ASSESSMENT — LOCATION DETAILED DESCRIPTION DERM
LOCATION DETAILED: LEFT SUPERIOR POSTERIOR NECK
LOCATION DETAILED: RIGHT LATERAL ZYGOMA
LOCATION DETAILED: LEFT DISTAL POSTERIOR UPPER ARM
LOCATION DETAILED: LEFT CENTRAL FRONTAL SCALP
LOCATION DETAILED: RIGHT PROXIMAL POSTERIOR UPPER ARM
LOCATION DETAILED: LEFT ANTERIOR PROXIMAL UPPER ARM
LOCATION DETAILED: LEFT INFERIOR MEDIAL MALAR CHEEK
LOCATION DETAILED: RIGHT ANTERIOR DISTAL UPPER ARM
LOCATION DETAILED: LEFT SUPERIOR LATERAL MIDBACK
LOCATION DETAILED: LEFT MEDIAL INFERIOR CHEST
LOCATION DETAILED: MID POSTERIOR NECK
LOCATION DETAILED: LEFT PROXIMAL POSTERIOR UPPER ARM
LOCATION DETAILED: LEFT EYEBROW
LOCATION DETAILED: RIGHT CENTRAL PARIETAL SCALP
LOCATION DETAILED: LEFT ANTERIOR DISTAL UPPER ARM
LOCATION DETAILED: EPIGASTRIC SKIN
LOCATION DETAILED: LEFT CENTRAL MALAR CHEEK
LOCATION DETAILED: LEFT INFERIOR PREAURICULAR CHEEK
LOCATION DETAILED: RIGHT DISTAL POSTERIOR UPPER ARM
LOCATION DETAILED: RIGHT FOREHEAD
LOCATION DETAILED: NASAL ROOT
LOCATION DETAILED: LEFT INFERIOR UPPER BACK
LOCATION DETAILED: RIGHT ANTERIOR PROXIMAL UPPER ARM
LOCATION DETAILED: RIGHT SUPERIOR PARIETAL SCALP
LOCATION DETAILED: LEFT POSTERIOR NECK
LOCATION DETAILED: LEFT INFERIOR FOREHEAD

## 2024-10-10 ASSESSMENT — LOCATION SIMPLE DESCRIPTION DERM
LOCATION SIMPLE: LEFT UPPER ARM
LOCATION SIMPLE: LEFT LOWER BACK
LOCATION SIMPLE: CHEST
LOCATION SIMPLE: NOSE
LOCATION SIMPLE: RIGHT UPPER ARM
LOCATION SIMPLE: LEFT UPPER BACK
LOCATION SIMPLE: LEFT POSTERIOR UPPER ARM
LOCATION SIMPLE: LEFT EYEBROW
LOCATION SIMPLE: ABDOMEN
LOCATION SIMPLE: SCALP
LOCATION SIMPLE: LEFT SCALP
LOCATION SIMPLE: POSTERIOR NECK
LOCATION SIMPLE: LEFT CHEEK
LOCATION SIMPLE: LEFT FOREHEAD
LOCATION SIMPLE: RIGHT FOREHEAD
LOCATION SIMPLE: RIGHT ZYGOMA

## 2024-10-10 ASSESSMENT — LOCATION ZONE DERM
LOCATION ZONE: ARM
LOCATION ZONE: FACE
LOCATION ZONE: NECK
LOCATION ZONE: TRUNK
LOCATION ZONE: SCALP
LOCATION ZONE: NOSE

## 2024-10-10 NOTE — PROCEDURE: LIQUID NITROGEN
Detail Level: Zone
Show Applicator Variable?: Yes
Number Of Freeze-Thaw Cycles: 2 freeze-thaw cycles
Consent: The patient's verbal consent was obtained including but not limited to risks of crusting, scabbing, blistering, scarring, darker or lighter pigmentary change, recurrence, incomplete removal and infection.
Duration Of Freeze Thaw-Cycle (Seconds): 8
Post-Care Instructions: I reviewed with the patient in detail post-care instructions. Patient is to wear sunprotection, and avoid picking at any of the treated lesions. Pt may apply Vaseline to crusted or scabbing areas.
Render Post-Care Instructions In Note?: no

## 2024-10-11 ASSESSMENT — LIFESTYLE VARIABLES
SMOKING_STATUS: NO
TOBACCO_USE: NO

## 2024-10-14 ENCOUNTER — HOSPITAL ENCOUNTER (OUTPATIENT)
Dept: RADIATION ONCOLOGY | Facility: MEDICAL CENTER | Age: 69
End: 2024-10-14
Attending: RADIOLOGY
Payer: MEDICARE

## 2024-10-14 PROCEDURE — 99441 PR PHYSICIAN TELEPHONE EVALUATION 5-10 MIN: CPT | Mod: 95 | Performed by: RADIOLOGY

## 2024-10-29 ENCOUNTER — APPOINTMENT (RX ONLY)
Dept: URBAN - METROPOLITAN AREA CLINIC 6 | Facility: CLINIC | Age: 69
Setting detail: DERMATOLOGY
End: 2024-10-29

## 2024-10-29 PROBLEM — D03.59 MELANOMA IN SITU OF OTHER PART OF TRUNK: Status: ACTIVE | Noted: 2024-10-29

## 2024-10-29 PROCEDURE — 11603 EXC TR-EXT MAL+MARG 2.1-3 CM: CPT

## 2024-10-29 PROCEDURE — 12032 INTMD RPR S/A/T/EXT 2.6-7.5: CPT

## 2024-10-29 PROCEDURE — ? EXCISION

## 2024-10-29 NOTE — PROCEDURE: EXCISION
Surgeon (Optional): Dr. Landaverde
Biopsy Photograph Reviewed: Yes
Previous Accession (Optional): [id number])
Date Of Previous Biopsy (Optional): 10/10/24
Size Of Lesion In Cm: 0.9
X Size Of Lesion In Cm (Optional): 0
Size Of Margin In Cm: 0.6
Anesthesia Volume In Cc: 6
Was An Eye Clamp Used?: No
Eye Clamp Note Details: An eye clamp was used during the procedure.
Excision Method: Fusiform
Saucerization Depth: dermis and superficial adipose tissue
Repair Type: Intermediate
Suturegard Retention Suture: 2-0 Nylon
Retention Suture Bite Size: 3 mm
Length To Time In Minutes Device Was In Place: 10
Number Of Hemigard Strips Per Side: 1
Undermining Type: Entire Wound
Debridement Text: The wound edges were debrided prior to proceeding with the closure to facilitate wound healing.
Helical Rim Text: The closure involved the helical rim.
Vermilion Border Text: The closure involved the vermilion border.
Nostril Rim Text: The closure involved the nostril rim.
Retention Suture Text: Retention sutures were placed to support the closure and prevent dehiscence.
Suture Removal: 14 days
Lab: [id number]
Lab Facility: 
Epidermal Closure Graft Donor Site (Optional): simple interrupted
Billing Type: Third-Party Bill
Excision Depth: adipose tissue
Scalpel Size: 15 blade
Anesthesia Type: 1% lidocaine with epinephrine
Hemostasis: Electrocautery and suture ligation
Estimated Blood Loss (Cc): minimal
Detail Level: Detailed
Repair Depth: use same depth as excision depth
Deep Sutures: 4-0 Monocryl
Dermal Closure: buried vertical mattress
Epidermal Sutures: 4-0 Ethilon
Epidermal Closure: running
Wound Care: Petrolatum
Dressing: pressure dressing with telfa
Suturegard Intro: Intraoperative tissue expansion was performed, utilizing the SUTUREGARD device, in order to reduce wound tension.
Suturegard Body: The suture ends were repeatedly re-tightened and re-clamped to achieve the desired tissue expansion.
Hemigard Intro: Due to skin fragility and wound tension, it was decided to use HEMIGARD adhesive retention suture devices to permit a linear closure. The skin was cleaned and dried for a 6cm distance away from the wound. Excessive hair, if present, was removed to allow for adhesion.
Hemigard Postcare Instructions: The HEMIGARD strips are to remain completely dry for at least 5-7 days.
Positioning (Leave Blank If You Do Not Want): The patient was placed in a comfortable position exposing the surgical site.
Pre-Excision Curettage Text (Leave Blank If You Do Not Want): Prior to drawing the surgical margin the visible lesion was removed with electrodesiccation and curettage to clearly define the lesion size.
Complex Repair Preamble Text (Leave Blank If You Do Not Want): Extensive wide undermining was performed.
Intermediate Repair Preamble Text (Leave Blank If You Do Not Want): Undermining was performed with blunt dissection.
Curvilinear Excision Additional Text (Leave Blank If You Do Not Want): The margin was drawn around the clinically apparent lesion.  A curvilinear shape was then drawn on the skin incorporating the lesion and margins.  Incisions were then made along these lines to the appropriate tissue plane and the lesion was extirpated.
Fusiform Excision Additional Text (Leave Blank If You Do Not Want): The margin was drawn around the clinically apparent lesion.  A fusiform shape was then drawn on the skin incorporating the lesion and margins.  Incisions were then made along these lines to the appropriate tissue plane and the lesion was extirpated.
Elliptical Excision Additional Text (Leave Blank If You Do Not Want): The margin was drawn around the clinically apparent lesion.  An elliptical shape was then drawn on the skin incorporating the lesion and margins.  Incisions were then made along these lines to the appropriate tissue plane and the lesion was extirpated.
Saucerization Excision Additional Text (Leave Blank If You Do Not Want): The margin was drawn around the clinically apparent lesion.  Incisions were then made along these lines, in a tangential fashion, to the appropriate tissue plane and the lesion was extirpated.
Slit Excision Additional Text (Leave Blank If You Do Not Want): A linear line was drawn on the skin overlying the lesion. An incision was made slowly until the lesion was visualized.  Once visualized, the lesion was removed with blunt dissection.
Excisional Biopsy Additional Text (Leave Blank If You Do Not Want): The margin was drawn around the clinically apparent lesion. An elliptical shape was then drawn on the skin incorporating the lesion and margins.  Incisions were then made along these lines to the appropriate tissue plane and the lesion was extirpated.
Perilesional Excision Additional Text (Leave Blank If You Do Not Want): The margin was drawn around the clinically apparent lesion. Incisions were then made along these lines to the appropriate tissue plane and the lesion was extirpated.
Repair Performed By Another Provider Text (Leave Blank If You Do Not Want): After the tissue was excised the defect was repaired by another provider.
No Repair - Repaired With Adjacent Surgical Defect Text (Leave Blank If You Do Not Want): After the excision the defect was repaired concurrently with another surgical defect which was in close approximation.
Adjacent Tissue Transfer Text: The defect edges were debeveled with a #15 scalpel blade.  Given the location of the defect and the proximity to free margins an adjacent tissue transfer was deemed most appropriate.  Using a sterile surgical marker, an appropriate flap was drawn incorporating the defect and placing the expected incisions within the relaxed skin tension lines where possible.    The area thus outlined was incised deep to adipose tissue with a #15 scalpel blade.  The skin margins were undermined to an appropriate distance in all directions utilizing iris scissors.
Advancement Flap (Single) Text: The defect edges were debeveled with a #15 scalpel blade.  Given the location of the defect and the proximity to free margins a single advancement flap was deemed most appropriate.  Using a sterile surgical marker, an appropriate advancement flap was drawn incorporating the defect and placing the expected incisions within the relaxed skin tension lines where possible.    The area thus outlined was incised deep to adipose tissue with a #15 scalpel blade.  The skin margins were undermined to an appropriate distance in all directions utilizing iris scissors.
Advancement Flap (Double) Text: The defect edges were debeveled with a #15 scalpel blade.  Given the location of the defect and the proximity to free margins a double advancement flap was deemed most appropriate.  Using a sterile surgical marker, the appropriate advancement flaps were drawn incorporating the defect and placing the expected incisions within the relaxed skin tension lines where possible.    The area thus outlined was incised deep to adipose tissue with a #15 scalpel blade.  The skin margins were undermined to an appropriate distance in all directions utilizing iris scissors.
Burow's Advancement Flap Text: The defect edges were debeveled with a #15 scalpel blade.  Given the location of the defect and the proximity to free margins a Burow's advancement flap was deemed most appropriate.  Using a sterile surgical marker, the appropriate advancement flap was drawn incorporating the defect and placing the expected incisions within the relaxed skin tension lines where possible.    The area thus outlined was incised deep to adipose tissue with a #15 scalpel blade.  The skin margins were undermined to an appropriate distance in all directions utilizing iris scissors.
Chonodrocutaneous Helical Advancement Flap Text: The defect edges were debeveled with a #15 scalpel blade.  Given the location of the defect and the proximity to free margins a chondrocutaneous helical advancement flap was deemed most appropriate.  Using a sterile surgical marker, the appropriate advancement flap was drawn incorporating the defect and placing the expected incisions within the relaxed skin tension lines where possible.    The area thus outlined was incised deep to adipose tissue with a #15 scalpel blade.  The skin margins were undermined to an appropriate distance in all directions utilizing iris scissors.
Crescentic Advancement Flap Text: The defect edges were debeveled with a #15 scalpel blade.  Given the location of the defect and the proximity to free margins a crescentic advancement flap was deemed most appropriate.  Using a sterile surgical marker, the appropriate advancement flap was drawn incorporating the defect and placing the expected incisions within the relaxed skin tension lines where possible.    The area thus outlined was incised deep to adipose tissue with a #15 scalpel blade.  The skin margins were undermined to an appropriate distance in all directions utilizing iris scissors.
A-T Advancement Flap Text: The defect edges were debeveled with a #15 scalpel blade.  Given the location of the defect, shape of the defect and the proximity to free margins an A-T advancement flap was deemed most appropriate.  Using a sterile surgical marker, an appropriate advancement flap was drawn incorporating the defect and placing the expected incisions within the relaxed skin tension lines where possible.    The area thus outlined was incised deep to adipose tissue with a #15 scalpel blade.  The skin margins were undermined to an appropriate distance in all directions utilizing iris scissors.
O-T Advancement Flap Text: The defect edges were debeveled with a #15 scalpel blade.  Given the location of the defect, shape of the defect and the proximity to free margins an O-T advancement flap was deemed most appropriate.  Using a sterile surgical marker, an appropriate advancement flap was drawn incorporating the defect and placing the expected incisions within the relaxed skin tension lines where possible.    The area thus outlined was incised deep to adipose tissue with a #15 scalpel blade.  The skin margins were undermined to an appropriate distance in all directions utilizing iris scissors.
O-L Flap Text: The defect edges were debeveled with a #15 scalpel blade.  Given the location of the defect, shape of the defect and the proximity to free margins an O-L flap was deemed most appropriate.  Using a sterile surgical marker, an appropriate advancement flap was drawn incorporating the defect and placing the expected incisions within the relaxed skin tension lines where possible.    The area thus outlined was incised deep to adipose tissue with a #15 scalpel blade.  The skin margins were undermined to an appropriate distance in all directions utilizing iris scissors.
O-Z Flap Text: The defect edges were debeveled with a #15 scalpel blade.  Given the location of the defect, shape of the defect and the proximity to free margins an O-Z flap was deemed most appropriate.  Using a sterile surgical marker, an appropriate transposition flap was drawn incorporating the defect and placing the expected incisions within the relaxed skin tension lines where possible. The area thus outlined was incised deep to adipose tissue with a #15 scalpel blade.  The skin margins were undermined to an appropriate distance in all directions utilizing iris scissors.
Double O-Z Flap Text: The defect edges were debeveled with a #15 scalpel blade.  Given the location of the defect, shape of the defect and the proximity to free margins a Double O-Z flap was deemed most appropriate.  Using a sterile surgical marker, an appropriate transposition flap was drawn incorporating the defect and placing the expected incisions within the relaxed skin tension lines where possible. The area thus outlined was incised deep to adipose tissue with a #15 scalpel blade.  The skin margins were undermined to an appropriate distance in all directions utilizing iris scissors.
V-Y Flap Text: The defect edges were debeveled with a #15 scalpel blade.  Given the location of the defect, shape of the defect and the proximity to free margins a V-Y flap was deemed most appropriate.  Using a sterile surgical marker, an appropriate advancement flap was drawn incorporating the defect and placing the expected incisions within the relaxed skin tension lines where possible.    The area thus outlined was incised deep to adipose tissue with a #15 scalpel blade.  The skin margins were undermined to an appropriate distance in all directions utilizing iris scissors.
Advancement-Rotation Flap Text: The defect edges were debeveled with a #15 scalpel blade.  Given the location of the defect, shape of the defect and the proximity to free margins an advancement-rotation flap was deemed most appropriate.  Using a sterile surgical marker, an appropriate flap was drawn incorporating the defect and placing the expected incisions within the relaxed skin tension lines where possible. The area thus outlined was incised deep to adipose tissue with a #15 scalpel blade.  The skin margins were undermined to an appropriate distance in all directions utilizing iris scissors.
Mercedes Flap Text: The defect edges were debeveled with a #15 scalpel blade.  Given the location of the defect, shape of the defect and the proximity to free margins a Mercedes flap was deemed most appropriate.  Using a sterile surgical marker, an appropriate advancement flap was drawn incorporating the defect and placing the expected incisions within the relaxed skin tension lines where possible. The area thus outlined was incised deep to adipose tissue with a #15 scalpel blade.  The skin margins were undermined to an appropriate distance in all directions utilizing iris scissors.
Modified Advancement Flap Text: The defect edges were debeveled with a #15 scalpel blade.  Given the location of the defect, shape of the defect and the proximity to free margins a modified advancement flap was deemed most appropriate.  Using a sterile surgical marker, an appropriate advancement flap was drawn incorporating the defect and placing the expected incisions within the relaxed skin tension lines where possible.    The area thus outlined was incised deep to adipose tissue with a #15 scalpel blade.  The skin margins were undermined to an appropriate distance in all directions utilizing iris scissors.
Mucosal Advancement Flap Text: Given the location of the defect, shape of the defect and the proximity to free margins a mucosal advancement flap was deemed most appropriate. Incisions were made with a 15 blade scalpel in the appropriate fashion along the cutaneous vermilion border and the mucosal lip. The remaining actinically damaged mucosal tissue was excised.  The mucosal advancement flap was then elevated to the gingival sulcus with care taken to preserve the neurovascular structures and advanced into the primary defect. Care was taken to ensure that precise realignment of the vermilion border was achieved.
Peng Advancement Flap Text: The defect edges were debeveled with a #15 scalpel blade.  Given the location of the defect, shape of the defect and the proximity to free margins a Peng advancement flap was deemed most appropriate.  Using a sterile surgical marker, an appropriate advancement flap was drawn incorporating the defect and placing the expected incisions within the relaxed skin tension lines where possible. The area thus outlined was incised deep to adipose tissue with a #15 scalpel blade.  The skin margins were undermined to an appropriate distance in all directions utilizing iris scissors.
Hatchet Flap Text: The defect edges were debeveled with a #15 scalpel blade.  Given the location of the defect, shape of the defect and the proximity to free margins a hatchet flap was deemed most appropriate.  Using a sterile surgical marker, an appropriate hatchet flap was drawn incorporating the defect and placing the expected incisions within the relaxed skin tension lines where possible.    The area thus outlined was incised deep to adipose tissue with a #15 scalpel blade.  The skin margins were undermined to an appropriate distance in all directions utilizing iris scissors.
Rotation Flap Text: The defect edges were debeveled with a #15 scalpel blade.  Given the location of the defect, shape of the defect and the proximity to free margins a rotation flap was deemed most appropriate.  Using a sterile surgical marker, an appropriate rotation flap was drawn incorporating the defect and placing the expected incisions within the relaxed skin tension lines where possible.    The area thus outlined was incised deep to adipose tissue with a #15 scalpel blade.  The skin margins were undermined to an appropriate distance in all directions utilizing iris scissors.
Bilateral Rotation Flap Text: The defect edges were debeveled with a #15 scalpel blade. Given the location of the defect, shape of the defect and the proximity to free margins a bilateral rotation flap was deemed most appropriate. Using a sterile surgical marker, an appropriate rotation flap was drawn incorporating the defect and placing the expected incisions within the relaxed skin tension lines where possible. The area thus outlined was incised deep to adipose tissue with a #15 scalpel blade. The skin margins were undermined to an appropriate distance in all directions utilizing iris scissors. Following this, the designed flap was carried over into the primary defect and sutured into place.
Spiral Flap Text: The defect edges were debeveled with a #15 scalpel blade.  Given the location of the defect, shape of the defect and the proximity to free margins a spiral flap was deemed most appropriate.  Using a sterile surgical marker, an appropriate rotation flap was drawn incorporating the defect and placing the expected incisions within the relaxed skin tension lines where possible. The area thus outlined was incised deep to adipose tissue with a #15 scalpel blade.  The skin margins were undermined to an appropriate distance in all directions utilizing iris scissors.
Staged Advancement Flap Text: The defect edges were debeveled with a #15 scalpel blade.  Given the location of the defect, shape of the defect and the proximity to free margins a staged advancement flap was deemed most appropriate.  Using a sterile surgical marker, an appropriate advancement flap was drawn incorporating the defect and placing the expected incisions within the relaxed skin tension lines where possible. The area thus outlined was incised deep to adipose tissue with a #15 scalpel blade.  The skin margins were undermined to an appropriate distance in all directions utilizing iris scissors.
Star Wedge Flap Text: The defect edges were debeveled with a #15 scalpel blade.  Given the location of the defect, shape of the defect and the proximity to free margins a star wedge flap was deemed most appropriate.  Using a sterile surgical marker, an appropriate rotation flap was drawn incorporating the defect and placing the expected incisions within the relaxed skin tension lines where possible. The area thus outlined was incised deep to adipose tissue with a #15 scalpel blade.  The skin margins were undermined to an appropriate distance in all directions utilizing iris scissors.
Transposition Flap Text: The defect edges were debeveled with a #15 scalpel blade.  Given the location of the defect and the proximity to free margins a transposition flap was deemed most appropriate.  Using a sterile surgical marker, an appropriate transposition flap was drawn incorporating the defect.    The area thus outlined was incised deep to adipose tissue with a #15 scalpel blade.  The skin margins were undermined to an appropriate distance in all directions utilizing iris scissors.
Muscle Hinge Flap Text: The defect edges were debeveled with a #15 scalpel blade.  Given the size, depth and location of the defect and the proximity to free margins a muscle hinge flap was deemed most appropriate.  Using a sterile surgical marker, an appropriate hinge flap was drawn incorporating the defect. The area thus outlined was incised with a #15 scalpel blade.  The skin margins were undermined to an appropriate distance in all directions utilizing iris scissors.
Mustarde Flap Text: The defect edges were debeveled with a #15 scalpel blade.  Given the size, depth and location of the defect and the proximity to free margins a Mustarde flap was deemed most appropriate.  Using a sterile surgical marker, an appropriate flap was drawn incorporating the defect. The area thus outlined was incised with a #15 scalpel blade.  The skin margins were undermined to an appropriate distance in all directions utilizing iris scissors.
Nasal Turnover Hinge Flap Text: The defect edges were debeveled with a #15 scalpel blade.  Given the size, depth, location of the defect and the defect being full thickness a nasal turnover hinge flap was deemed most appropriate.  Using a sterile surgical marker, an appropriate hinge flap was drawn incorporating the defect. The area thus outlined was incised with a #15 scalpel blade. The flap was designed to recreate the nasal mucosal lining and the alar rim. The skin margins were undermined to an appropriate distance in all directions utilizing iris scissors.
Nasalis-Muscle-Based Myocutaneous Island Pedicle Flap Text: Using a #15 blade, an incision was made around the donor flap to the level of the nasalis muscle. Wide lateral undermining was then performed in both the subcutaneous plane above the nasalis muscle, and in a submuscular plane just above periosteum. This allowed the formation of a free nasalis muscle axial pedicle (based on the angular artery) which was still attached to the actual cutaneous flap, increasing its mobility and vascular viability. Hemostasis was obtained with pinpoint electrocoagulation. The flap was mobilized into position and the pivotal anchor points positioned and stabilized with buried interrupted sutures. Subcutaneous and dermal tissues were closed in a multilayered fashion with sutures. Tissue redundancies were excised, and the epidermal edges were apposed without significant tension and sutured with sutures.
Nasalis Myocutaneous Flap Text: Using a #15 blade, an incision was made around the donor flap to the level of the nasalis muscle. Wide lateral undermining was then performed in both the subcutaneous plane above the nasalis muscle, and in a submuscular plane just above periosteum. This allowed the formation of a free nasalis muscle axial pedicle which was still attached to the actual cutaneous flap, increasing its mobility and vascular viability. Hemostasis was obtained with pinpoint electrocoagulation. The flap was mobilized into position and the pivotal anchor points positioned and stabilized with buried interrupted sutures. Subcutaneous and dermal tissues were closed in a multilayered fashion with sutures. Tissue redundancies were excised, and the epidermal edges were apposed without significant tension and sutured with sutures.
Nasolabial Transposition Flap Text: The defect edges were debeveled with a #15 scalpel blade.  Given the size, depth and location of the defect and the proximity to free margins a nasolabial transposition flap was deemed most appropriate. Using a sterile surgical marker, an appropriate flap was drawn incorporating the defect. The area thus outlined was incised with a #15 scalpel blade. The skin margins were undermined to an appropriate distance in all directions utilizing iris scissors. Following this, the designed flap was carried into the primary defect and sutured into place.
Orbicularis Oris Muscle Flap Text: The defect edges were debeveled with a #15 scalpel blade.  Given that the defect affected the competency of the oral sphincter an orbicularis oris muscle flap was deemed most appropriate to restore this competency and normal muscle function.  Using a sterile surgical marker, an appropriate flap was drawn incorporating the defect. The area thus outlined was incised with a #15 scalpel blade.
Melolabial Transposition Flap Text: The defect edges were debeveled with a #15 scalpel blade.  Given the location of the defect and the proximity to free margins a melolabial flap was deemed most appropriate.  Using a sterile surgical marker, an appropriate melolabial transposition flap was drawn incorporating the defect.    The area thus outlined was incised deep to adipose tissue with a #15 scalpel blade.  The skin margins were undermined to an appropriate distance in all directions utilizing iris scissors.
Rectangular Flap Text: The defect edges were debeveled with a #15 scalpel blade. Given the location of the defect and the proximity to free margins a rectangular flap was deemed most appropriate. Using a sterile surgical marker, an appropriate rectangular flap was drawn incorporating the defect. The area thus outlined was incised deep to adipose tissue with a #15 scalpel blade. The skin margins were undermined to an appropriate distance in all directions utilizing iris scissors. Following this, the designed flap was carried over into the primary defect and sutured into place.
Rhombic Flap Text: The defect edges were debeveled with a #15 scalpel blade.  Given the location of the defect and the proximity to free margins a rhombic flap was deemed most appropriate.  Using a sterile surgical marker, an appropriate rhombic flap was drawn incorporating the defect.    The area thus outlined was incised deep to adipose tissue with a #15 scalpel blade.  The skin margins were undermined to an appropriate distance in all directions utilizing iris scissors.
Rhomboid Transposition Flap Text: The defect edges were debeveled with a #15 scalpel blade.  Given the location of the defect and the proximity to free margins a rhomboid transposition flap was deemed most appropriate.  Using a sterile surgical marker, an appropriate rhomboid flap was drawn incorporating the defect.    The area thus outlined was incised deep to adipose tissue with a #15 scalpel blade.  The skin margins were undermined to an appropriate distance in all directions utilizing iris scissors.
Bi-Rhombic Flap Text: The defect edges were debeveled with a #15 scalpel blade.  Given the location of the defect and the proximity to free margins a bi-rhombic flap was deemed most appropriate.  Using a sterile surgical marker, an appropriate rhombic flap was drawn incorporating the defect. The area thus outlined was incised deep to adipose tissue with a #15 scalpel blade.  The skin margins were undermined to an appropriate distance in all directions utilizing iris scissors.
Helical Rim Advancement Flap Text: The defect edges were debeveled with a #15 blade scalpel.  Given the location of the defect and the proximity to free margins (helical rim) a double helical rim advancement flap was deemed most appropriate.  Using a sterile surgical marker, the appropriate advancement flaps were drawn incorporating the defect and placing the expected incisions between the helical rim and antihelix where possible.  The area thus outlined was incised through and through with a #15 scalpel blade.  With a skin hook and iris scissors, the flaps were gently and sharply undermined and freed up.
Bilateral Helical Rim Advancement Flap Text: The defect edges were debeveled with a #15 blade scalpel.  Given the location of the defect and the proximity to free margins (helical rim) a bilateral helical rim advancement flap was deemed most appropriate.  Using a sterile surgical marker, the appropriate advancement flaps were drawn incorporating the defect and placing the expected incisions between the helical rim and antihelix where possible.  The area thus outlined was incised through and through with a #15 scalpel blade.  With a skin hook and iris scissors, the flaps were gently and sharply undermined and freed up.
Ear Star Wedge Flap Text: The defect edges were debeveled with a #15 blade scalpel.  Given the location of the defect and the proximity to free margins (helical rim) an ear star wedge flap was deemed most appropriate.  Using a sterile surgical marker, the appropriate flap was drawn incorporating the defect and placing the expected incisions between the helical rim and antihelix where possible.  The area thus outlined was incised through and through with a #15 scalpel blade.
Flip-Flop Flap Text: The defect edges were debeveled with a #15 blade scalpel.  Given the location of the defect and the proximity to free margins a flip-flop flap was deemed most appropriate. Using a sterile surgical marker, the appropriate flap was drawn incorporating the defect and placing the expected incisions between the helical rim and antihelix where possible.  The area thus outlined was incised through and through with a #15 scalpel blade. Following this, the designed flap was carried over into the primary defect and sutured into place.
Banner Transposition Flap Text: The defect edges were debeveled with a #15 scalpel blade.  Given the location of the defect and the proximity to free margins a Banner transposition flap was deemed most appropriate.  Using a sterile surgical marker, an appropriate flap drawn around the defect. The area thus outlined was incised deep to adipose tissue with a #15 scalpel blade.  The skin margins were undermined to an appropriate distance in all directions utilizing iris scissors.
Bilobed Flap Text: The defect edges were debeveled with a #15 scalpel blade.  Given the location of the defect and the proximity to free margins a bilobe flap was deemed most appropriate.  Using a sterile surgical marker, an appropriate bilobe flap drawn around the defect.    The area thus outlined was incised deep to adipose tissue with a #15 scalpel blade.  The skin margins were undermined to an appropriate distance in all directions utilizing iris scissors.
Bilobed Transposition Flap Text: The defect edges were debeveled with a #15 scalpel blade.  Given the location of the defect and the proximity to free margins a bilobed transposition flap was deemed most appropriate.  Using a sterile surgical marker, an appropriate bilobe flap drawn around the defect.    The area thus outlined was incised deep to adipose tissue with a #15 scalpel blade.  The skin margins were undermined to an appropriate distance in all directions utilizing iris scissors.
Trilobed Flap Text: The defect edges were debeveled with a #15 scalpel blade.  Given the location of the defect and the proximity to free margins a trilobed flap was deemed most appropriate.  Using a sterile surgical marker, an appropriate trilobed flap drawn around the defect.    The area thus outlined was incised deep to adipose tissue with a #15 scalpel blade.  The skin margins were undermined to an appropriate distance in all directions utilizing iris scissors.
Dorsal Nasal Flap Text: The defect edges were debeveled with a #15 scalpel blade.  Given the location of the defect and the proximity to free margins a dorsal nasal flap was deemed most appropriate.  Using a sterile surgical marker, an appropriate dorsal nasal flap was drawn around the defect.    The area thus outlined was incised deep to adipose tissue with a #15 scalpel blade.  The skin margins were undermined to an appropriate distance in all directions utilizing iris scissors.
Island Pedicle Flap Text: The defect edges were debeveled with a #15 scalpel blade.  Given the location of the defect, shape of the defect and the proximity to free margins an island pedicle advancement flap was deemed most appropriate.  Using a sterile surgical marker, an appropriate advancement flap was drawn incorporating the defect, outlining the appropriate donor tissue and placing the expected incisions within the relaxed skin tension lines where possible.    The area thus outlined was incised deep to adipose tissue with a #15 scalpel blade.  The skin margins were undermined to an appropriate distance in all directions around the primary defect and laterally outward around the island pedicle utilizing iris scissors.  There was minimal undermining beneath the pedicle flap.
Island Pedicle Flap With Canthal Suspension Text: The defect edges were debeveled with a #15 scalpel blade.  Given the location of the defect, shape of the defect and the proximity to free margins an island pedicle advancement flap was deemed most appropriate.  Using a sterile surgical marker, an appropriate advancement flap was drawn incorporating the defect, outlining the appropriate donor tissue and placing the expected incisions within the relaxed skin tension lines where possible. The area thus outlined was incised deep to adipose tissue with a #15 scalpel blade.  The skin margins were undermined to an appropriate distance in all directions around the primary defect and laterally outward around the island pedicle utilizing iris scissors.  There was minimal undermining beneath the pedicle flap. A suspension suture was placed in the canthal tendon to prevent tension and prevent ectropion.
Alar Island Pedicle Flap Text: The defect edges were debeveled with a #15 scalpel blade.  Given the location of the defect, shape of the defect and the proximity to the alar rim an island pedicle advancement flap was deemed most appropriate.  Using a sterile surgical marker, an appropriate advancement flap was drawn incorporating the defect, outlining the appropriate donor tissue and placing the expected incisions within the nasal ala running parallel to the alar rim. The area thus outlined was incised with a #15 scalpel blade.  The skin margins were undermined minimally to an appropriate distance in all directions around the primary defect and laterally outward around the island pedicle utilizing iris scissors.  There was minimal undermining beneath the pedicle flap.
Double Island Pedicle Flap Text: The defect edges were debeveled with a #15 scalpel blade.  Given the location of the defect, shape of the defect and the proximity to free margins a double island pedicle advancement flap was deemed most appropriate.  Using a sterile surgical marker, an appropriate advancement flap was drawn incorporating the defect, outlining the appropriate donor tissue and placing the expected incisions within the relaxed skin tension lines where possible.    The area thus outlined was incised deep to adipose tissue with a #15 scalpel blade.  The skin margins were undermined to an appropriate distance in all directions around the primary defect and laterally outward around the island pedicle utilizing iris scissors.  There was minimal undermining beneath the pedicle flap.
Island Pedicle Flap-Requiring Vessel Identification Text: The defect edges were debeveled with a #15 scalpel blade.  Given the location of the defect, shape of the defect and the proximity to free margins an island pedicle advancement flap was deemed most appropriate.  Using a sterile surgical marker, an appropriate advancement flap was drawn, based on the axial vessel mentioned above, incorporating the defect, outlining the appropriate donor tissue and placing the expected incisions within the relaxed skin tension lines where possible.    The area thus outlined was incised deep to adipose tissue with a #15 scalpel blade.  The skin margins were undermined to an appropriate distance in all directions around the primary defect and laterally outward around the island pedicle utilizing iris scissors.  There was minimal undermining beneath the pedicle flap.
Keystone Flap Text: The defect edges were debeveled with a #15 scalpel blade.  Given the location of the defect, shape of the defect a keystone flap was deemed most appropriate.  Using a sterile surgical marker, an appropriate keystone flap was drawn incorporating the defect, outlining the appropriate donor tissue and placing the expected incisions within the relaxed skin tension lines where possible. The area thus outlined was incised deep to adipose tissue with a #15 scalpel blade.  The skin margins were undermined to an appropriate distance in all directions around the primary defect and laterally outward around the flap utilizing iris scissors.
O-T Plasty Text: The defect edges were debeveled with a #15 scalpel blade.  Given the location of the defect, shape of the defect and the proximity to free margins an O-T plasty was deemed most appropriate.  Using a sterile surgical marker, an appropriate O-T plasty was drawn incorporating the defect and placing the expected incisions within the relaxed skin tension lines where possible.    The area thus outlined was incised deep to adipose tissue with a #15 scalpel blade.  The skin margins were undermined to an appropriate distance in all directions utilizing iris scissors.
O-Z Plasty Text: The defect edges were debeveled with a #15 scalpel blade.  Given the location of the defect, shape of the defect and the proximity to free margins an O-Z plasty (double transposition flap) was deemed most appropriate.  Using a sterile surgical marker, the appropriate transposition flaps were drawn incorporating the defect and placing the expected incisions within the relaxed skin tension lines where possible.    The area thus outlined was incised deep to adipose tissue with a #15 scalpel blade.  The skin margins were undermined to an appropriate distance in all directions utilizing iris scissors.  Hemostasis was achieved with electrocautery.  The flaps were then transposed into place, one clockwise and the other counterclockwise, and anchored with interrupted buried subcutaneous sutures.
Double O-Z Plasty Text: The defect edges were debeveled with a #15 scalpel blade.  Given the location of the defect, shape of the defect and the proximity to free margins a Double O-Z plasty (double transposition flap) was deemed most appropriate.  Using a sterile surgical marker, the appropriate transposition flaps were drawn incorporating the defect and placing the expected incisions within the relaxed skin tension lines where possible. The area thus outlined was incised deep to adipose tissue with a #15 scalpel blade.  The skin margins were undermined to an appropriate distance in all directions utilizing iris scissors.  Hemostasis was achieved with electrocautery.  The flaps were then transposed into place, one clockwise and the other counterclockwise, and anchored with interrupted buried subcutaneous sutures.
V-Y Plasty Text: The defect edges were debeveled with a #15 scalpel blade.  Given the location of the defect, shape of the defect and the proximity to free margins an V-Y advancement flap was deemed most appropriate.  Using a sterile surgical marker, an appropriate advancement flap was drawn incorporating the defect and placing the expected incisions within the relaxed skin tension lines where possible.    The area thus outlined was incised deep to adipose tissue with a #15 scalpel blade.  The skin margins were undermined to an appropriate distance in all directions utilizing iris scissors.
H Plasty Text: Given the location of the defect, shape of the defect and the proximity to free margins a H-plasty was deemed most appropriate for repair.  Using a sterile surgical marker, the appropriate advancement arms of the H-plasty were drawn incorporating the defect and placing the expected incisions within the relaxed skin tension lines where possible. The area thus outlined was incised deep to adipose tissue with a #15 scalpel blade. The skin margins were undermined to an appropriate distance in all directions utilizing iris scissors.  The opposing advancement arms were then advanced into place in opposite direction and anchored with interrupted buried subcutaneous sutures.
W Plasty Text: The lesion was extirpated to the level of the fat with a #15 scalpel blade.  Given the location of the defect, shape of the defect and the proximity to free margins a W-plasty was deemed most appropriate for repair.  Using a sterile surgical marker, the appropriate transposition arms of the W-plasty were drawn incorporating the defect and placing the expected incisions within the relaxed skin tension lines where possible.    The area thus outlined was incised deep to adipose tissue with a #15 scalpel blade.  The skin margins were undermined to an appropriate distance in all directions utilizing iris scissors.  The opposing transposition arms were then transposed into place in opposite direction and anchored with interrupted buried subcutaneous sutures.
Z Plasty Text: The lesion was extirpated to the level of the fat with a #15 scalpel blade.  Given the location of the defect, shape of the defect and the proximity to free margins a Z-plasty was deemed most appropriate for repair.  Using a sterile surgical marker, the appropriate transposition arms of the Z-plasty were drawn incorporating the defect and placing the expected incisions within the relaxed skin tension lines where possible.    The area thus outlined was incised deep to adipose tissue with a #15 scalpel blade.  The skin margins were undermined to an appropriate distance in all directions utilizing iris scissors.  The opposing transposition arms were then transposed into place in opposite direction and anchored with interrupted buried subcutaneous sutures.
Double Z Plasty Text: The lesion was extirpated to the level of the fat with a #15 scalpel blade. Given the location of the defect, shape of the defect and the proximity to free margins a double Z-plasty was deemed most appropriate for repair. Using a sterile surgical marker, the appropriate transposition arms of the double Z-plasty were drawn incorporating the defect and placing the expected incisions within the relaxed skin tension lines where possible. The area thus outlined was incised deep to adipose tissue with a #15 scalpel blade. The skin margins were undermined to an appropriate distance in all directions utilizing iris scissors. The opposing transposition arms were then transposed and carried over into place in opposite direction and anchored with interrupted buried subcutaneous sutures.
Zygomaticofacial Flap Text: Given the location of the defect, shape of the defect and the proximity to free margins a zygomaticofacial flap was deemed most appropriate for repair.  Using a sterile surgical marker, the appropriate flap was drawn incorporating the defect and placing the expected incisions within the relaxed skin tension lines where possible. The area thus outlined was incised deep to adipose tissue with a #15 scalpel blade with preservation of a vascular pedicle.  The skin margins were undermined to an appropriate distance in all directions utilizing iris scissors.  The flap was then placed into the defect and anchored with interrupted buried subcutaneous sutures.
Cheek Interpolation Flap Text: A decision was made to reconstruct the defect utilizing an interpolation axial flap and a staged reconstruction.  A telfa template was made of the defect.  This telfa template was then used to outline the Cheek Interpolation flap.  The donor area for the pedicle flap was then injected with anesthesia.  The flap was excised through the skin and subcutaneous tissue down to the layer of the underlying musculature.  The interpolation flap was carefully excised within this deep plane to maintain its blood supply.  The edges of the donor site were undermined.   The donor site was closed in a primary fashion.  The pedicle was then rotated into position and sutured.  Once the tube was sutured into place, adequate blood supply was confirmed with blanching and refill.  The pedicle was then wrapped with xeroform gauze and dressed appropriately with a telfa and gauze bandage to ensure continued blood supply and protect the attached pedicle.
Cheek-To-Nose Interpolation Flap Text: A decision was made to reconstruct the defect utilizing an interpolation axial flap and a staged reconstruction.  A telfa template was made of the defect.  This telfa template was then used to outline the Cheek-To-Nose Interpolation flap.  The donor area for the pedicle flap was then injected with anesthesia.  The flap was excised through the skin and subcutaneous tissue down to the layer of the underlying musculature.  The interpolation flap was carefully excised within this deep plane to maintain its blood supply.  The edges of the donor site were undermined.   The donor site was closed in a primary fashion.  The pedicle was then rotated into position and sutured.  Once the tube was sutured into place, adequate blood supply was confirmed with blanching and refill.  The pedicle was then wrapped with xeroform gauze and dressed appropriately with a telfa and gauze bandage to ensure continued blood supply and protect the attached pedicle.
Interpolation Flap Text: A decision was made to reconstruct the defect utilizing an interpolation axial flap and a staged reconstruction.  A telfa template was made of the defect.  This telfa template was then used to outline the interpolation flap.  The donor area for the pedicle flap was then injected with anesthesia.  The flap was excised through the skin and subcutaneous tissue down to the layer of the underlying musculature.  The interpolation flap was carefully excised within this deep plane to maintain its blood supply.  The edges of the donor site were undermined.   The donor site was closed in a primary fashion.  The pedicle was then rotated into position and sutured.  Once the tube was sutured into place, adequate blood supply was confirmed with blanching and refill.  The pedicle was then wrapped with xeroform gauze and dressed appropriately with a telfa and gauze bandage to ensure continued blood supply and protect the attached pedicle.
Melolabial Interpolation Flap Text: A decision was made to reconstruct the defect utilizing an interpolation axial flap and a staged reconstruction.  A telfa template was made of the defect.  This telfa template was then used to outline the melolabial interpolation flap.  The donor area for the pedicle flap was then injected with anesthesia.  The flap was excised through the skin and subcutaneous tissue down to the layer of the underlying musculature.  The pedicle flap was carefully excised within this deep plane to maintain its blood supply.  The edges of the donor site were undermined.   The donor site was closed in a primary fashion.  The pedicle was then rotated into position and sutured.  Once the tube was sutured into place, adequate blood supply was confirmed with blanching and refill.  The pedicle was then wrapped with xeroform gauze and dressed appropriately with a telfa and gauze bandage to ensure continued blood supply and protect the attached pedicle.
Mastoid Interpolation Flap Text: A decision was made to reconstruct the defect utilizing an interpolation axial flap and a staged reconstruction.  A telfa template was made of the defect.  This telfa template was then used to outline the mastoid interpolation flap.  The donor area for the pedicle flap was then injected with anesthesia.  The flap was excised through the skin and subcutaneous tissue down to the layer of the underlying musculature.  The pedicle flap was carefully excised within this deep plane to maintain its blood supply.  The edges of the donor site were undermined.   The donor site was closed in a primary fashion.  The pedicle was then rotated into position and sutured.  Once the tube was sutured into place, adequate blood supply was confirmed with blanching and refill.  The pedicle was then wrapped with xeroform gauze and dressed appropriately with a telfa and gauze bandage to ensure continued blood supply and protect the attached pedicle.
Posterior Auricular Interpolation Flap Text: A decision was made to reconstruct the defect utilizing an interpolation axial flap and a staged reconstruction.  A telfa template was made of the defect.  This telfa template was then used to outline the posterior auricular interpolation flap.  The donor area for the pedicle flap was then injected with anesthesia.  The flap was excised through the skin and subcutaneous tissue down to the layer of the underlying musculature.  The pedicle flap was carefully excised within this deep plane to maintain its blood supply.  The edges of the donor site were undermined.   The donor site was closed in a primary fashion.  The pedicle was then rotated into position and sutured.  Once the tube was sutured into place, adequate blood supply was confirmed with blanching and refill.  The pedicle was then wrapped with xeroform gauze and dressed appropriately with a telfa and gauze bandage to ensure continued blood supply and protect the attached pedicle.
Paramedian Forehead Flap Text: A decision was made to reconstruct the defect utilizing an interpolation axial flap and a staged reconstruction.  A telfa template was made of the defect.  This telfa template was then used to outline the paramedian forehead pedicle flap.  The donor area for the pedicle flap was then injected with anesthesia.  The flap was excised through the skin and subcutaneous tissue down to the layer of the underlying musculature.  The pedicle flap was carefully excised within this deep plane to maintain its blood supply.  The edges of the donor site were undermined.   The donor site was closed in a primary fashion.  The pedicle was then rotated into position and sutured.  Once the tube was sutured into place, adequate blood supply was confirmed with blanching and refill.  The pedicle was then wrapped with xeroform gauze and dressed appropriately with a telfa and gauze bandage to ensure continued blood supply and protect the attached pedicle.
Abbe Flap (Upper To Lower Lip) Text: The defect of the lower lip was assessed and measured.  Given the location and size of the defect, an Abbe flap was deemed most appropriate. Using a sterile surgical marker, an appropriate Abbe flap was measured and drawn on the upper lip. Local anesthesia was then infiltrated.  A scalpel was then used to incise the upper lip through and through the skin, vermilion, muscle and mucosa, leaving the flap pedicled on the opposite side.  The flap was then rotated and transferred to the lower lip defect.  The flap was then sutured into place with a three layer technique, closing the orbicularis oris muscle layer with subcutaneous buried sutures, followed by a mucosal layer and an epidermal layer.
Abbe Flap (Lower To Upper Lip) Text: The defect of the upper lip was assessed and measured.  Given the location and size of the defect, an Abbe flap was deemed most appropriate. Using a sterile surgical marker, an appropriate Abbe flap was measured and drawn on the lower lip. Local anesthesia was then infiltrated. A scalpel was then used to incise the upper lip through and through the skin, vermilion, muscle and mucosa, leaving the flap pedicled on the opposite side.  The flap was then rotated and transferred to the lower lip defect.  The flap was then sutured into place with a three layer technique, closing the orbicularis oris muscle layer with subcutaneous buried sutures, followed by a mucosal layer and an epidermal layer.
Estlander Flap (Upper To Lower Lip) Text: The defect of the lower lip was assessed and measured.  Given the location and size of the defect, an Estlander flap was deemed most appropriate. Using a sterile surgical marker, an appropriate Estlander flap was measured and drawn on the upper lip. Local anesthesia was then infiltrated. A scalpel was then used to incise the lateral aspect of the flap, through skin, muscle and mucosa, leaving the flap pedicled medially.  The flap was then rotated and positioned to fill the lower lip defect.  The flap was then sutured into place with a three layer technique, closing the orbicularis oris muscle layer with subcutaneous buried sutures, followed by a mucosal layer and an epidermal layer.
Lip Wedge Excision Repair Text: Given the location of the defect and the proximity to free margins a full thickness wedge repair was deemed most appropriate.  Using a sterile surgical marker, the appropriate repair was drawn incorporating the defect and placing the expected incisions perpendicular to the vermilion border.  The vermilion border was also meticulously outlined to ensure appropriate reapproximation during the repair.  The area thus outlined was incised through and through with a #15 scalpel blade.  The muscularis and dermis were reaproximated with deep sutures following hemostasis. Care was taken to realign the vermilion border before proceeding with the superficial closure.  Once the vermilion was realigned the superfical and mucosal closure was finished.
Ftsg Text: The defect edges were debeveled with a #15 scalpel blade.  Given the location of the defect, shape of the defect and the proximity to free margins a full thickness skin graft was deemed most appropriate.  Using a sterile surgical marker, the primary defect shape was transferred to the donor site. The area thus outlined was incised deep to adipose tissue with a #15 scalpel blade.  The harvested graft was then trimmed of adipose tissue until only dermis and epidermis was left.  The skin margins of the secondary defect were undermined to an appropriate distance in all directions utilizing iris scissors.  The secondary defect was closed with interrupted buried subcutaneous sutures.  The skin edges were then re-apposed with running  sutures.  The skin graft was then placed in the primary defect and oriented appropriately.
Split-Thickness Skin Graft Text: The defect edges were debeveled with a #15 scalpel blade.  Given the location of the defect, shape of the defect and the proximity to free margins a split thickness skin graft was deemed most appropriate.  Using a sterile surgical marker, the primary defect shape was transferred to the donor site. The split thickness graft was then harvested.  The skin graft was then placed in the primary defect and oriented appropriately.
Pinch Graft Text: The defect edges were debeveled with a #15 scalpel blade. Given the location of the defect, shape of the defect and the proximity to free margins a pinch graft was deemed most appropriate. Using a sterile surgical marker, the primary defect shape was transferred to the donor site. The area thus outlined was incised deep to adipose tissue with a #15 scalpel blade.  The harvested graft was then trimmed of adipose tissue until only dermis and epidermis was left. The skin graft was then placed in the primary defect and oriented appropriately.
Burow's Graft Text: The defect edges were debeveled with a #15 scalpel blade.  Given the location of the defect, shape of the defect, the proximity to free margins and the presence of a standing cone deformity a Burow's skin graft was deemed most appropriate. The standing cone was removed and this tissue was then trimmed to the shape of the primary defect. The adipose tissue was also removed until only dermis and epidermis were left.  The skin margins of the secondary defect were undermined to an appropriate distance in all directions utilizing iris scissors.  The secondary defect was closed with interrupted buried subcutaneous sutures.  The skin edges were then re-apposed with running  sutures.  The skin graft was then placed in the primary defect and oriented appropriately.
Cartilage Graft Text: The defect edges were debeveled with a #15 scalpel blade.  Given the location of the defect, shape of the defect, the fact the defect involved a full thickness cartilage defect a cartilage graft was deemed most appropriate.  An appropriate donor site was identified, cleansed, and anesthetized. The cartilage graft was then harvested and transferred to the recipient site, oriented appropriately and then sutured into place.  The secondary defect was then repaired using a primary closure.
Composite Graft Text: The defect edges were debeveled with a #15 scalpel blade.  Given the location of the defect, shape of the defect, the proximity to free margins and the fact the defect was full thickness a composite graft was deemed most appropriate.  The defect was outline and then transferred to the donor site.  A full thickness graft was then excised from the donor site. The graft was then placed in the primary defect, oriented appropriately and then sutured into place.  The secondary defect was then repaired using a primary closure.
Epidermal Autograft Text: The defect edges were debeveled with a #15 scalpel blade.  Given the location of the defect, shape of the defect and the proximity to free margins an epidermal autograft was deemed most appropriate.  Using a sterile surgical marker, the primary defect shape was transferred to the donor site. The epidermal graft was then harvested.  The skin graft was then placed in the primary defect and oriented appropriately.
Dermal Autograft Text: The defect edges were debeveled with a #15 scalpel blade.  Given the location of the defect, shape of the defect and the proximity to free margins a dermal autograft was deemed most appropriate.  Using a sterile surgical marker, the primary defect shape was transferred to the donor site. The area thus outlined was incised deep to adipose tissue with a #15 scalpel blade.  The harvested graft was then trimmed of adipose and epidermal tissue until only dermis was left.  The skin graft was then placed in the primary defect and oriented appropriately.
Skin Substitute Text: The defect edges were debeveled with a #15 scalpel blade.  Given the location of the defect, shape of the defect and the proximity to free margins a skin substitute graft was deemed most appropriate.  The graft material was trimmed to fit the size of the defect. The graft was then placed in the primary defect and oriented appropriately.
Tissue Cultured Epidermal Autograft Text: The defect edges were debeveled with a #15 scalpel blade.  Given the location of the defect, shape of the defect and the proximity to free margins a tissue cultured epidermal autograft was deemed most appropriate.  The graft was then trimmed to fit the size of the defect.  The graft was then placed in the primary defect and oriented appropriately.
Xenograft Text: The defect edges were debeveled with a #15 scalpel blade.  Given the location of the defect, shape of the defect and the proximity to free margins a xenograft was deemed most appropriate.  The graft was then trimmed to fit the size of the defect.  The graft was then placed in the primary defect and oriented appropriately.
Purse String (Intermediate) Text: Given the location of the defect and the characteristics of the surrounding skin a purse string intermediate closure was deemed most appropriate.  Undermining was performed circumfirentially around the surgical defect.  A purse string suture was then placed and tightened.
Purse String (Simple) Text: Given the location of the defect and the characteristics of the surrounding skin a purse string simple closure was deemed most appropriate.  Undermining was performed circumferentially around the surgical defect.  A purse string suture was then placed and tightened.
Partial Purse String (Intermediate) Text: Given the location of the defect and the characteristics of the surrounding skin an intermediate purse string closure was deemed most appropriate.  Undermining was performed circumferentially around the surgical defect.  A purse string suture was then placed and tightened. Wound tension of the circular defect prevented complete closure of the wound.
Partial Purse String (Simple) Text: Given the location of the defect and the characteristics of the surrounding skin a simple purse string closure was deemed most appropriate.  Undermining was performed circumferentially around the surgical defect.  A purse string suture was then placed and tightened. Wound tension of the circular defect prevented complete closure of the wound.
Complex Repair And Single Advancement Flap Text: The defect edges were debeveled with a #15 scalpel blade.  The primary defect was closed partially with a complex linear closure.  Given the location of the remaining defect, shape of the defect and the proximity to free margins a single advancement flap was deemed most appropriate for complete closure of the defect.  Using a sterile surgical marker, an appropriate advancement flap was drawn incorporating the defect and placing the expected incisions within the relaxed skin tension lines where possible.    The area thus outlined was incised deep to adipose tissue with a #15 scalpel blade.  The skin margins were undermined to an appropriate distance in all directions utilizing iris scissors.
Complex Repair And Double Advancement Flap Text: The defect edges were debeveled with a #15 scalpel blade.  The primary defect was closed partially with a complex linear closure.  Given the location of the remaining defect, shape of the defect and the proximity to free margins a double advancement flap was deemed most appropriate for complete closure of the defect.  Using a sterile surgical marker, an appropriate advancement flap was drawn incorporating the defect and placing the expected incisions within the relaxed skin tension lines where possible.    The area thus outlined was incised deep to adipose tissue with a #15 scalpel blade.  The skin margins were undermined to an appropriate distance in all directions utilizing iris scissors.
Complex Repair And Modified Advancement Flap Text: The defect edges were debeveled with a #15 scalpel blade.  The primary defect was closed partially with a complex linear closure.  Given the location of the remaining defect, shape of the defect and the proximity to free margins a modified advancement flap was deemed most appropriate for complete closure of the defect.  Using a sterile surgical marker, an appropriate advancement flap was drawn incorporating the defect and placing the expected incisions within the relaxed skin tension lines where possible.    The area thus outlined was incised deep to adipose tissue with a #15 scalpel blade.  The skin margins were undermined to an appropriate distance in all directions utilizing iris scissors.
Complex Repair And A-T Advancement Flap Text: The defect edges were debeveled with a #15 scalpel blade.  The primary defect was closed partially with a complex linear closure.  Given the location of the remaining defect, shape of the defect and the proximity to free margins an A-T advancement flap was deemed most appropriate for complete closure of the defect.  Using a sterile surgical marker, an appropriate advancement flap was drawn incorporating the defect and placing the expected incisions within the relaxed skin tension lines where possible.    The area thus outlined was incised deep to adipose tissue with a #15 scalpel blade.  The skin margins were undermined to an appropriate distance in all directions utilizing iris scissors.
Complex Repair And O-T Advancement Flap Text: The defect edges were debeveled with a #15 scalpel blade.  The primary defect was closed partially with a complex linear closure.  Given the location of the remaining defect, shape of the defect and the proximity to free margins an O-T advancement flap was deemed most appropriate for complete closure of the defect.  Using a sterile surgical marker, an appropriate advancement flap was drawn incorporating the defect and placing the expected incisions within the relaxed skin tension lines where possible.    The area thus outlined was incised deep to adipose tissue with a #15 scalpel blade.  The skin margins were undermined to an appropriate distance in all directions utilizing iris scissors.
Complex Repair And O-L Flap Text: The defect edges were debeveled with a #15 scalpel blade.  The primary defect was closed partially with a complex linear closure.  Given the location of the remaining defect, shape of the defect and the proximity to free margins an O-L flap was deemed most appropriate for complete closure of the defect.  Using a sterile surgical marker, an appropriate flap was drawn incorporating the defect and placing the expected incisions within the relaxed skin tension lines where possible.    The area thus outlined was incised deep to adipose tissue with a #15 scalpel blade.  The skin margins were undermined to an appropriate distance in all directions utilizing iris scissors.
Complex Repair And Bilobe Flap Text: The defect edges were debeveled with a #15 scalpel blade.  The primary defect was closed partially with a complex linear closure.  Given the location of the remaining defect, shape of the defect and the proximity to free margins a bilobe flap was deemed most appropriate for complete closure of the defect.  Using a sterile surgical marker, an appropriate advancement flap was drawn incorporating the defect and placing the expected incisions within the relaxed skin tension lines where possible.    The area thus outlined was incised deep to adipose tissue with a #15 scalpel blade.  The skin margins were undermined to an appropriate distance in all directions utilizing iris scissors.
Complex Repair And Melolabial Flap Text: The defect edges were debeveled with a #15 scalpel blade.  The primary defect was closed partially with a complex linear closure.  Given the location of the remaining defect, shape of the defect and the proximity to free margins a melolabial flap was deemed most appropriate for complete closure of the defect.  Using a sterile surgical marker, an appropriate advancement flap was drawn incorporating the defect and placing the expected incisions within the relaxed skin tension lines where possible.    The area thus outlined was incised deep to adipose tissue with a #15 scalpel blade.  The skin margins were undermined to an appropriate distance in all directions utilizing iris scissors.
Complex Repair And Rotation Flap Text: The defect edges were debeveled with a #15 scalpel blade.  The primary defect was closed partially with a complex linear closure.  Given the location of the remaining defect, shape of the defect and the proximity to free margins a rotation flap was deemed most appropriate for complete closure of the defect.  Using a sterile surgical marker, an appropriate advancement flap was drawn incorporating the defect and placing the expected incisions within the relaxed skin tension lines where possible.    The area thus outlined was incised deep to adipose tissue with a #15 scalpel blade.  The skin margins were undermined to an appropriate distance in all directions utilizing iris scissors.
Complex Repair And Rhombic Flap Text: The defect edges were debeveled with a #15 scalpel blade.  The primary defect was closed partially with a complex linear closure.  Given the location of the remaining defect, shape of the defect and the proximity to free margins a rhombic flap was deemed most appropriate for complete closure of the defect.  Using a sterile surgical marker, an appropriate advancement flap was drawn incorporating the defect and placing the expected incisions within the relaxed skin tension lines where possible.    The area thus outlined was incised deep to adipose tissue with a #15 scalpel blade.  The skin margins were undermined to an appropriate distance in all directions utilizing iris scissors.
Complex Repair And Transposition Flap Text: The defect edges were debeveled with a #15 scalpel blade.  The primary defect was closed partially with a complex linear closure.  Given the location of the remaining defect, shape of the defect and the proximity to free margins a transposition flap was deemed most appropriate for complete closure of the defect.  Using a sterile surgical marker, an appropriate advancement flap was drawn incorporating the defect and placing the expected incisions within the relaxed skin tension lines where possible.    The area thus outlined was incised deep to adipose tissue with a #15 scalpel blade.  The skin margins were undermined to an appropriate distance in all directions utilizing iris scissors.
Complex Repair And V-Y Plasty Text: The defect edges were debeveled with a #15 scalpel blade.  The primary defect was closed partially with a complex linear closure.  Given the location of the remaining defect, shape of the defect and the proximity to free margins a V-Y plasty was deemed most appropriate for complete closure of the defect.  Using a sterile surgical marker, an appropriate advancement flap was drawn incorporating the defect and placing the expected incisions within the relaxed skin tension lines where possible.    The area thus outlined was incised deep to adipose tissue with a #15 scalpel blade.  The skin margins were undermined to an appropriate distance in all directions utilizing iris scissors.
Complex Repair And M Plasty Text: The defect edges were debeveled with a #15 scalpel blade.  The primary defect was closed partially with a complex linear closure.  Given the location of the remaining defect, shape of the defect and the proximity to free margins an M plasty was deemed most appropriate for complete closure of the defect.  Using a sterile surgical marker, an appropriate advancement flap was drawn incorporating the defect and placing the expected incisions within the relaxed skin tension lines where possible.    The area thus outlined was incised deep to adipose tissue with a #15 scalpel blade.  The skin margins were undermined to an appropriate distance in all directions utilizing iris scissors.
Complex Repair And Double M Plasty Text: The defect edges were debeveled with a #15 scalpel blade.  The primary defect was closed partially with a complex linear closure.  Given the location of the remaining defect, shape of the defect and the proximity to free margins a double M plasty was deemed most appropriate for complete closure of the defect.  Using a sterile surgical marker, an appropriate advancement flap was drawn incorporating the defect and placing the expected incisions within the relaxed skin tension lines where possible.    The area thus outlined was incised deep to adipose tissue with a #15 scalpel blade.  The skin margins were undermined to an appropriate distance in all directions utilizing iris scissors.
Complex Repair And W Plasty Text: The defect edges were debeveled with a #15 scalpel blade.  The primary defect was closed partially with a complex linear closure.  Given the location of the remaining defect, shape of the defect and the proximity to free margins a W plasty was deemed most appropriate for complete closure of the defect.  Using a sterile surgical marker, an appropriate advancement flap was drawn incorporating the defect and placing the expected incisions within the relaxed skin tension lines where possible.    The area thus outlined was incised deep to adipose tissue with a #15 scalpel blade.  The skin margins were undermined to an appropriate distance in all directions utilizing iris scissors.
Complex Repair And Z Plasty Text: The defect edges were debeveled with a #15 scalpel blade.  The primary defect was closed partially with a complex linear closure.  Given the location of the remaining defect, shape of the defect and the proximity to free margins a Z plasty was deemed most appropriate for complete closure of the defect.  Using a sterile surgical marker, an appropriate advancement flap was drawn incorporating the defect and placing the expected incisions within the relaxed skin tension lines where possible.    The area thus outlined was incised deep to adipose tissue with a #15 scalpel blade.  The skin margins were undermined to an appropriate distance in all directions utilizing iris scissors.
Complex Repair And Dorsal Nasal Flap Text: The defect edges were debeveled with a #15 scalpel blade.  The primary defect was closed partially with a complex linear closure.  Given the location of the remaining defect, shape of the defect and the proximity to free margins a dorsal nasal flap was deemed most appropriate for complete closure of the defect.  Using a sterile surgical marker, an appropriate flap was drawn incorporating the defect and placing the expected incisions within the relaxed skin tension lines where possible.    The area thus outlined was incised deep to adipose tissue with a #15 scalpel blade.  The skin margins were undermined to an appropriate distance in all directions utilizing iris scissors.
Complex Repair And Ftsg Text: The defect edges were debeveled with a #15 scalpel blade.  The primary defect was closed partially with a complex linear closure.  Given the location of the defect, shape of the defect and the proximity to free margins a full thickness skin graft was deemed most appropriate to repair the remaining defect.  The graft was trimmed to fit the size of the remaining defect.  The graft was then placed in the primary defect, oriented appropriately, and sutured into place.
Complex Repair And Burow's Graft Text: The defect edges were debeveled with a #15 scalpel blade.  The primary defect was closed partially with a complex linear closure.  Given the location of the defect, shape of the defect, the proximity to free margins and the presence of a standing cone deformity a Burow's graft was deemed most appropriate to repair the remaining defect.  The graft was trimmed to fit the size of the remaining defect.  The graft was then placed in the primary defect, oriented appropriately, and sutured into place.
Complex Repair And Split-Thickness Skin Graft Text: The defect edges were debeveled with a #15 scalpel blade.  The primary defect was closed partially with a complex linear closure.  Given the location of the defect, shape of the defect and the proximity to free margins a split thickness skin graft was deemed most appropriate to repair the remaining defect.  The graft was trimmed to fit the size of the remaining defect.  The graft was then placed in the primary defect, oriented appropriately, and sutured into place.
Complex Repair And Epidermal Autograft Text: The defect edges were debeveled with a #15 scalpel blade.  The primary defect was closed partially with a complex linear closure.  Given the location of the defect, shape of the defect and the proximity to free margins an epidermal autograft was deemed most appropriate to repair the remaining defect.  The graft was trimmed to fit the size of the remaining defect.  The graft was then placed in the primary defect, oriented appropriately, and sutured into place.
Complex Repair And Dermal Autograft Text: The defect edges were debeveled with a #15 scalpel blade.  The primary defect was closed partially with a complex linear closure.  Given the location of the defect, shape of the defect and the proximity to free margins an dermal autograft was deemed most appropriate to repair the remaining defect.  The graft was trimmed to fit the size of the remaining defect.  The graft was then placed in the primary defect, oriented appropriately, and sutured into place.
Complex Repair And Tissue Cultured Epidermal Autograft Text: The defect edges were debeveled with a #15 scalpel blade.  The primary defect was closed partially with a complex linear closure.  Given the location of the defect, shape of the defect and the proximity to free margins an tissue cultured epidermal autograft was deemed most appropriate to repair the remaining defect.  The graft was trimmed to fit the size of the remaining defect.  The graft was then placed in the primary defect, oriented appropriately, and sutured into place.
Complex Repair And Xenograft Text: The defect edges were debeveled with a #15 scalpel blade.  The primary defect was closed partially with a complex linear closure.  Given the location of the defect, shape of the defect and the proximity to free margins a xenograft was deemed most appropriate to repair the remaining defect.  The graft was trimmed to fit the size of the remaining defect.  The graft was then placed in the primary defect, oriented appropriately, and sutured into place.
Complex Repair And Skin Substitute Graft Text: The defect edges were debeveled with a #15 scalpel blade.  The primary defect was closed partially with a complex linear closure.  Given the location of the remaining defect, shape of the defect and the proximity to free margins a skin substitute graft was deemed most appropriate to repair the remaining defect.  The graft was trimmed to fit the size of the remaining defect.  The graft was then placed in the primary defect, oriented appropriately, and sutured into place.
Path Notes (To The Dermatopathologist): Please check margins.
Consent: Written consent was obtained from the patient. The risks and benefits to therapy were discussed in detail. Specifically, the risks of infection, scarring, bleeding, prolonged wound healing, incomplete removal, allergy to anesthesia, nerve injury and recurrence were addressed. Prior to the procedure, the treatment site was clearly identified and confirmed by the patient. All components of Universal Protocol/PAUSE Rule completed.
Post-Care Instructions: I reviewed with the patient in detail post-care instructions. Patient is not to engage in any heavy lifting, exercise, or swimming for the next 14 days. Should the patient develop any fevers, chills, bleeding, severe pain patient will contact the office immediately.
Home Suture Removal Text: Patient was provided a home suture removal kit and will remove their sutures at home.  If they have any questions or difficulties they will call the office.
Where Do You Want The Question To Include Opioid Counseling Located?: Case Summary Tab
Information: Selecting Yes will display possible errors in your note based on the variables you have selected. This validation is only offered as a suggestion for you. PLEASE NOTE THAT THE VALIDATION TEXT WILL BE REMOVED WHEN YOU FINALIZE YOUR NOTE. IF YOU WANT TO FAX A PRELIMINARY NOTE YOU WILL NEED TO TOGGLE THIS TO 'NO' IF YOU DO NOT WANT IT IN YOUR FAXED NOTE.

## 2024-11-12 ENCOUNTER — APPOINTMENT (RX ONLY)
Dept: URBAN - METROPOLITAN AREA CLINIC 6 | Facility: CLINIC | Age: 69
Setting detail: DERMATOLOGY
End: 2024-11-12

## 2024-11-12 DIAGNOSIS — Z48.02 ENCOUNTER FOR REMOVAL OF SUTURES: ICD-10-CM

## 2024-11-12 PROCEDURE — 99024 POSTOP FOLLOW-UP VISIT: CPT

## 2024-11-12 PROCEDURE — ? SUTURE REMOVAL (GLOBAL PERIOD)

## 2024-11-12 ASSESSMENT — LOCATION ZONE DERM: LOCATION ZONE: TRUNK

## 2024-11-12 ASSESSMENT — LOCATION SIMPLE DESCRIPTION DERM: LOCATION SIMPLE: CHEST

## 2024-11-12 ASSESSMENT — LOCATION DETAILED DESCRIPTION DERM: LOCATION DETAILED: LEFT MEDIAL INFERIOR CHEST

## 2024-11-12 NOTE — PROCEDURE: SUTURE REMOVAL (GLOBAL PERIOD)
Detail Level: Detailed
Add 37683 Cpt? (Important Note: In 2017 The Use Of 37671 Is Being Tracked By Cms To Determine Future Global Period Reimbursement For Global Periods): yes

## 2024-12-02 ENCOUNTER — HOSPITAL ENCOUNTER (OUTPATIENT)
Dept: RADIATION ONCOLOGY | Facility: MEDICAL CENTER | Age: 69
End: 2024-12-02
Attending: RADIOLOGY
Payer: MEDICARE

## 2024-12-06 DIAGNOSIS — C79.51 PROSTATE CANCER METASTATIC TO BONE (HCC): ICD-10-CM

## 2024-12-06 DIAGNOSIS — C61 PROSTATE CANCER METASTATIC TO BONE (HCC): ICD-10-CM

## 2024-12-06 NOTE — CT SIMULATION
PATIENT NAME Manuel Quinn   PRIMARY PHYSICIAN NoemiDarling JT [id number]   REFERRING PHYSICIAN No ref. provider found 1955     Prostate cancer metastatic to bone (HCC)  Staging form: Prostate, AJCC 8th Edition  - Clinical stage from 7/2/2024: Stage IVB (cT3b, cN0, cM1b, PSA: 163, Grade Group: 3) - Signed by Magdaleno Yang M.D. on 7/2/2024  Histopathologic type: Adenocarcinoma, NOS  Stage prefix: Initial diagnosis  Prostate specific antigen (PSA) range: 20 or greater  [place] score: 7  Histologic grading system: 5 grade system         Treatment Planning CT Simulation      Order Questions     Question Answer    Is this for a new course of treatment? Yes    Is this an Addendum? No    Implanted Device/Pacemaker No    Simulation Status Initial    Treatment Site Prostate and Seminal Vessicles    Laterality Midline    Treatment Technique IMRT    Treatment Pattern/Frequency Daily    Number of fractions: 20    Concurrent Chemotherapy No    CT Technique 3D    Slice Thickness 2mm    Scan Extent Pelvis     Abdomen    Treatment Device(s) Vac Светлана    Patient Position Supine    Patient Orientation Head First    Arm Position On Chest    Bladder Full    Treatment Machine No preference    Treatment Image Guidance CBCT    Frequency (CBCT) Daily    Image Guidance Match Bone     PTV - Soft Tissue    Treatment Planning Image Fusion CT/MR     CT/PET    Other Orders Weekly Physics Check    Release to patient Immediate

## 2024-12-10 ENCOUNTER — OFFICE VISIT (OUTPATIENT)
Dept: UROLOGY | Facility: MEDICAL CENTER | Age: 69
End: 2024-12-10
Payer: MEDICARE

## 2024-12-10 ENCOUNTER — HOSPITAL ENCOUNTER (OUTPATIENT)
Dept: RADIATION ONCOLOGY | Facility: MEDICAL CENTER | Age: 69
End: 2024-12-10

## 2024-12-10 DIAGNOSIS — C79.51 PROSTATE CANCER METASTATIC TO BONE (HCC): ICD-10-CM

## 2024-12-10 DIAGNOSIS — R35.1 NOCTURIA: ICD-10-CM

## 2024-12-10 DIAGNOSIS — R35.0 URINARY FREQUENCY: ICD-10-CM

## 2024-12-10 DIAGNOSIS — N52.2 DRUG-INDUCED ERECTILE DYSFUNCTION: ICD-10-CM

## 2024-12-10 DIAGNOSIS — C61 PROSTATE CANCER METASTATIC TO BONE (HCC): ICD-10-CM

## 2024-12-10 PROCEDURE — 77334 RADIATION TREATMENT AID(S): CPT | Mod: 26 | Performed by: RADIOLOGY

## 2024-12-10 PROCEDURE — 77290 THER RAD SIMULAJ FIELD CPLX: CPT | Performed by: RADIOLOGY

## 2024-12-10 PROCEDURE — 77263 THER RADIOLOGY TX PLNG CPLX: CPT | Performed by: RADIOLOGY

## 2024-12-10 PROCEDURE — 99213 OFFICE O/P EST LOW 20 MIN: CPT | Performed by: UROLOGY

## 2024-12-10 PROCEDURE — 77334 RADIATION TREATMENT AID(S): CPT | Performed by: RADIOLOGY

## 2024-12-10 NOTE — PROGRESS NOTES
Chief Complaint: prostate cancer; history of urinary retention and obstructive nephropathy    HPI: Manuel Quinn is a 69 y.o. male with a history of metastatic castrate sensitive prostate cancer, with urinary retention following a prostate biopsy leading to hydronephrosis and acute kidney injury, here today for follow up.     With androgen deprivation therapy, he has had progressively improved urinary function. He still has some nocturia (up to 3 times per night) but is voiding with a good stream during the day and without straining, and does not have bothersome daytime urgency or frequency. This is continued improvement compared to our last visit in August.    He is scheduled for simulation for radiotherapy this afternoon.     With ADT, he has noticed a loss of both erectile function and libido. Interested in treatment options.     Past Medical History:  Past Medical History:   Diagnosis Date    Inguinal hernia     L    Kidney stone        Past Surgical History:  History reviewed. No pertinent surgical history.    Family History:  Family History   Family history unknown: Yes       Social History:  Social History     Socioeconomic History    Marital status:      Spouse name: Not on file    Number of children: Not on file    Years of education: Not on file    Highest education level: Not on file   Occupational History    Not on file   Tobacco Use    Smoking status: Never    Smokeless tobacco: Never   Vaping Use    Vaping status: Never Used   Substance and Sexual Activity    Alcohol use: Yes     Comment: occ    Drug use: Never    Sexual activity: Not on file   Other Topics Concern    Not on file   Social History Narrative    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 Lives in [facility] [place] and [place] with his wife Rashmi.      Social Drivers of Health     Financial Resource Strain: Not on file   Food Insecurity: Not on file   Transportation Needs: Not on file   Physical Activity: Not on file   Stress: Not on file    Social Connections: Not on file   Intimate Partner Violence: Not on file   Housing Stability: Not on file       Medications:  Current Outpatient Medications   Medication Sig Dispense Refill    predniSONE (DELTASONE) 5 MG Tab Take 1 Tablet by mouth 2 times a day. 180 Tablet 3    Abiraterone Acetate (ZYTIGA) 250 MG Tab Take 4 tablets (1,000 mg) by mouth every day. 120 Tablet 11    tamsulosin (FLOMAX) 0.4 MG capsule Take 2 Capsules by mouth 1/2 hour after breakfast for 360 days. Take before bed (not after breakfast) 180 Capsule 3    Ascorbic Acid (VITAMIN C) 1000 MG Tab Take 500 mg by mouth every day.      NON SPECIFIED Supplement called Quercitian      Cholecalciferol (VITAMIN D) 2000 UNIT Tab Take 2,000 Units by mouth every day.      Zinc 20 MG Cap Take 20 mg by mouth every day.       No current facility-administered medications for this visit.       Allergies:  Allergies   Allergen Reactions    Eggs Or Egg-Derived Products [Chicken-Derived Products] Diarrhea and Vomiting     25 years       Review of Systems:  Constitutional: Negative for fever, chills.  Positive for energy loss/early fatigue.   HENT: Negative for congestion.    Eyes: Negative for pain.   Respiratory: Negative for cough and shortness of breath.    Cardiovascular: Negative for leg swelling.   Gastrointestinal: Negative for nausea, vomiting, abdominal pain and diarrhea.   Genitourinary: Negative for dysuria and hematuria.   Skin: Negative for rash.   Neurological: Negative for dizziness, focal weakness and headaches.   Endo/Heme/Allergies: Does not bruise/bleed easily.   Psychiatric/Behavioral: Negative for depression.  The patient is not nervous/anxious.        Physical Exam:  There were no vitals filed for this visit.    GENERAL: well appearing, well nourished, NAD  RESP: respiratory effort normal  ABDOMEN: soft, nontender, nondistended, no masses or organomegaly  SKIN/LYMPH: normal coloration and turgor, no suspicious skin lesions  noted  NEURO/PSYCH: alert, oriented, normal speech, no focal findings or movement disorder noted  EXTREMITIES: no pedal edema noted    Data Review:    Labs:  POCT UA   Lab Results   Component Value Date/Time    POCCOLOR yellow 06/04/2024 10:48 AM    POCAPPEAR clear 06/04/2024 10:48 AM    POCLEUKEST neg 06/04/2024 10:48 AM    POCNITRITE neg 06/04/2024 10:48 AM    POCUROBILIGE 0.2 06/04/2024 10:48 AM    POCPROTEIN neg 06/04/2024 10:48 AM    POCURPH 6.0 06/04/2024 10:48 AM    POCBLOOD neg 06/04/2024 10:48 AM    POCSPGRV 1.010 06/04/2024 10:48 AM    POCKETONES neg 06/04/2024 10:48 AM    POCBILIRUBIN neg 06/04/2024 10:48 AM    POCGLUCUA neg 06/04/2024 10:48 AM      CBC   Lab Results   Component Value Date/Time    WBC 7.1 10/02/2024 1231    RBC 4.77 10/02/2024 1231    HEMOGLOBIN 15.7 10/02/2024 1231    HEMATOCRIT 45.3 10/02/2024 1231    MCV 95.0 10/02/2024 1231    MCH 32.9 10/02/2024 1231    MCHC 34.7 10/02/2024 1231    RDW 44.6 10/02/2024 1231    MPV 9.8 10/02/2024 1231    LYMPHOCYTES 8.60 (L) 10/02/2024 1231    LYMPHS 0.61 (L) 10/02/2024 1231    MONOCYTES 6.30 10/02/2024 1231    MONOS 0.45 10/02/2024 1231    EOSINOPHILS 0.60 10/02/2024 1231    EOS 0.04 10/02/2024 1231    BASOPHILS 0.30 10/02/2024 1231    BASO 0.02 10/02/2024 1231    NRBC 0.00 10/02/2024 1231       CMP   Lab Results   Component Value Date/Time    SODIUM 135 10/02/2024 1231    POTASSIUM 4.4 10/02/2024 1231    CHLORIDE 101 10/02/2024 1231    CO2 23 10/02/2024 1231    ANION 11.0 10/02/2024 1231    GLUCOSE 156 (H) 10/02/2024 1231    BUN 22 10/02/2024 1231    CREATININE 1.01 10/02/2024 1231    GFRCKD 80 10/02/2024 1231    CALCIUM 9.3 10/02/2024 1231    CORRCALC 9.2 10/02/2024 1231    ASTSGOT 19 10/02/2024 1231    ALTSGPT 23 10/02/2024 1231    ALKPHOSPHAT 91 10/02/2024 1231    TBILIRUBIN 0.5 10/02/2024 1231    ALBUMIN 4.1 10/02/2024 1231    TOTPROTEIN 7.1 10/02/2024 1231    GLOBULIN 3.0 10/02/2024 1231    AGRATIO 1.4 10/02/2024 1231       Imaging:    CT-PETCT-PROSTATE SCAN SKULL BASE TO MID-THIGH (PYLARIFY OR AXUMIN)  Order: [id number]   Status: Final result       Visible to patient: Yes (seen)       Next appt: Today at 03:00 PM in Radiation Oncology (RADIATION THERAPY)       Dx: Prostate cancer metastatic to bone (H...    0 Result Notes  Details    Reading Physician Reading Date Result Priority   Deric Figueroa M.D.  [phone] 7/25/2024      Narrative & Impression     7/25/2024 12:25 PM     HISTORY/REASON FOR EXAM:  metastatic prostate cacner to bone     TECHNIQUE/EXAM DESCRIPTION AND NUMBER OF VIEWS:     Injected tracer dose: 8.22 mCi of [id number](PYLARIFY?).     Anatomical region: The area imaged included the skull base to mid thighs     Scan technique: Following IV administration of the radiopharmaceutical, images were acquired using a PET-CT scanner. PET imaging was performed from the mid thighs to the skull base. A low-dose CT scan was performed for attenuation correction and anatomic   correlation only. Evaluation of solid organs and bowel are especially limited utilizing this technique.     CT images, PET images, and PET/CT fused images were reviewed on a PACS 3D workstation.     COMPARISON: Nuclear medicine bone scan 5/29/2024     FINDINGS:     PROSTATE BED: Increased activity primarily in the RIGHT side.  Activity extends posteriorly likely involving seminal vesicle.     LYMPH NODES: No abnormal pelvic lymph node activity.     OSSEOUS STRUCTURES: Increased activity within the inferior pubic rami on both sides, more extensive on the RIGHT.  Small focus of increased activity in the LEFT sacrum posteriorly.  Small focus of increased activity in the RIGHT posterior ilium.     OTHER:     Physiologic distribution of tracer is seen salivary and lacrimal glands, blood pool, liver, spleen, pancreas, ganglia, bone marrow, bowel, kidneys and urinary tract.     CT FINDINGS: Bilateral hydroureteronephrosis.  Diffuse bladder wall thickening.  Large LEFT  inguinal hernia containing sigmoid colon.     IMPRESSION:     1.  Findings consistent with residual or recurrent tumor in the prostate primarily on the RIGHT with probable extension to involve the seminal vesicle.  2.  Bony metastatic disease involving the inferior pubic rami on both sides, RIGHT posterior ilium and LEFT posterior sacrum.  3.  Bilateral hydronephrosis.  4.  Diffuse bladder wall thickening.       Assessment: 69 y.o.male with a history of urinary retention following prostate biopsy, now voiding spontaneously for the last month. His PVR is moderately elevated but he is comfortable, and follow up renal ultrasound and labs demonstrate normal renal function. He is undergoing therapy for metastatic castrate sensitive prostate cancer.     We did discuss dietary and fluid management recommendations to help limit nocturia.    Also discussed ED that occurs with androgen deprivation, and treatment options for improving erectile function. I explained the use of a vacuum erection device (BOY) including its appropriate use. We discussed the use of oral phosphodiesterase inhibitors, including sildenafil, tadalafil, and vardenafil. I explained how to appropriately take these medications for maximum effect, as well as potential side effects. Sildenafil should be taken on an empty stomach about an hour before sexual activity, and side effects may include headache, GI upset, vision changes including a blue-tinge, and facial flushing. Tadalafil may be taken up to 24 hours prior to sexual activity but has its peak effect within the first few hours and should be taken about 30 minutes prior to sexual activity; it can be taken with or without food. Side effects can also include headache and indigestion, as well as back pain and myalgias. Vardenafil is a short acting PDE5 inhibitor and should be taken 30-60 minutes prior to expected intercourse. Side effects may include headache, flushing, visual disturbances, and  dyspepsia.     We also discussed more invasive methods of treatment, including intraurethral alprostadil and intracavernosal injection therapy (ICI). I explained to the patient that if he would like to consider either of these options, we would need to arrange for an in-office test dose. With both medications we discussed the possible side effects of penile pain and priapism.     We did not discuss penile prosthesis surgery today.     Intracavernosal Injections    This handout explains what intracavernosal injections are and how they are used to help men achieve erections suitable for sex. For an electronic copy of this brochure and more information on ED, please visit our website at [email or web address] There we have several education resources including video content and other handouts. If you ever have any questions or concerns, please feel free to call the [facility] office at [phone].                  What are intracavernosal injections and how do they work?    Intracavernosal injections are a way for men with more severe erectile dysfunction to achieve an erection suitable for sex.    Medications that are designed to manage erectile dysfunction work by prompting the penile arteries to dilate. This allows for sufficient blood flow into the corpora cavernosa to provide an erection. Unfortunately, oral medications like sildenafil (Viagra) and tadalafil (Cialis) do not work for everyone, can have intolerable side effects, or interact with other medications such that they must be avoided.      Intracavernosal injections contain medications that are similar to oral medications in function, but are much more potent.  There are many different formulations of intracavernosal injections, each with different names. Some common names include Bimix, Trimix, and Quadmix. Although they are usually not covered by insurance, they are usually quite affordable from compounding pharmacies.    What are the  benefits of intracavernosal injections?    Intracavernosal injections are a great option for men with who wish to regain sexual function but do not desire (or are not candidates for) a more lasting solution like the penile implant.     This means they can be a great option for men too ill to undergo medical procedures or for men who are still in the first few months of recovery following prostate removal for prostate cancer.    Intracavernosal injections are also more potent than oral medications like sildenafil (Viagra) and tadalafil (Cialis), which means that they can work for men who have failed those options. They also do not have the same systemic side effects like headaches, muscle aches, visual changes or heartburn.    What are the drawbacks of intracavernosal injections?    Although intracavernosal injections are generally considered to be safe, it is important to be aware of potential side effects.     Intracavernosal injections must be performed on an as-needed basis. This means that men must perform a penile injection every time they wish to achieve an erection. They also must be kept in the refrigerator as the medication quickly deteriorates at room temperature. These two facts mean that injections are not very spontaneous, which can be a challenge for many couples.     Although it takes some time to develop, men who use injections for several months or longer are at risk for developing scar tissue in the penis known as fibrosis. This can make the injections more difficult to perform. In some cases, men can even develop Peyronie's Disease and penile curvature as the result of their injections.    It's important to remember that injections are not a cure for erectile dysfunction and do not stop the progressive loss of penile length and girth cause by it. On average, men with significant erectile dysfunction will lose about 1 inch of penile length each year. The only solution that stops this ongoing loss  and allows men to recover their some of their size is the penile implant.    Some men may notice a slight burning with their injection. This may depend on the type of medication being used and can occasionally persist for several minutes. Men should also be careful to only inject along the side of the penile shaft, as the urine tube (the urethra) runs at the 6 o'clock position and the nerves that supply the head of the penis run at the 12 o'clock position. Detailed instructions on injection technique are provided below.    Occasionally, the injections may work too well and cause a prolonged erection that can be painful known as a priapism. Although uncommon, this is defined as an erection lasting longer than 4 hours and is considered to be a medical emergency.    Generally, we recommend that patients seek care well before the 4-hour rick. Men experiencing a prolonged erection lasting 1 full hour should take 120 mg of over-the-counter non-extended release pseudoephedrine hydrochloride (aka Sudafed). Men starting injection therapy should keep a supply of this medication on-hand just in case. If the erection has not resolved in 30 minutes (1.5 hours since it first started), we recommend that men seek assistance urgently. If during business hours, we recommend that men call our office to see if one of our providers are available to see you. If outside of business hours or if isn't possible to [facility] at [facility], we recommend going to the closest emergency room.                   How should I perform the injections?    The injection should be given directly into penile shaft at the 3 o'clock or 9 o'clock position. You do not want to give injection directly on top (12 o'clock) or bottom (6 o'clock). Please give the injection mid-shaft and avoid the head of your penis (see figure below).    Prepare the medication as directed. Some pharmacies will require mixing sterile water with the  medication powder while others will pre-mix the medication.  Using the provided needle and syringe, draw up your prescribed dose of medication and replace the needle cap.  Grasp the head of your penis, not the skin. If you are not circumcised, pull your foreskin back before grasping the head of your penis. Pull your penis straight out.  Locate the area to be injected (see above). Wipe it with an alcohol swab.  Remove the cap covering the needle. Double check the syringe to make sure the dose is correct and you haven't pushed any medication out by accident. Hold the syringe like a pen or a dart. Do not place your index finger or thumb on the plunger until the needle is all the way in the skin.   Once again, grasp the head of your penis with your thumb at the 12 o'clock position and pull it straight out. You must keep tension on your penis; do not twist it since this could lead to injecting the wrong area.  Touch the needle to the skin and gently slide it into the shaft of your penis. Make sure to avoid any veins.  Make sure to insert the needle at a slight angle and push it all the way in (See figure above).  Push down on the plunger to-inject the medication into the shaft of your penis. Be careful not to pull the syringe out as you are injecting the medication.  Remove the needle after you have injected all the medication. Pull it straight out. Do not use a twisting or jerking motion because this may cause bruising. Apply pressure, if bleeding, for 1 to 2 minutes with your thumb on the injection site and your index finger on the opposite side of your penis. If you are taking a blood thinner or aspirin, may need to hold longer.  Place the syringe into a sharps container. If you do not have a sharps container, you may use an empty plastic laundry detergent container. These can be disposed of safely at most major pharmacies.  We recommend alternating sides and injection location with each injection.    How do I find my  ideal dose?    Finding your optimal intracavernosal injection dose can take some trial and error. For men that are new to intracavernosal injections, we recommend starting at 0.1 cc or 10 units (depending on syringe given by the pharmacy). If your response is insufficient, you may increase your dosing by 0.05 cc or 5 units as needed in order to achieve the desired effect (a firm erection lasting no longer than one hour). You should never perform more than one injection every 48 hours and you should never use a dose higher than one full syringe (100 units or 1.0 cc). Increasing your dose by too much or performing more than 1 injection every 48 hours significantly increases your chance of developing a priapism (prolonged, painful erection).    How do I get started with intracavernosal injections?    If you are interested in starting intracavernosal injections and haven't yet discussed it with your provider or haven't been prescribed any medication yet, please call our office at [phone] to schedule an appointment or (if you're already an established patient) request a prescription and injection teaching appointment.    Our office primarily uses injectable medication that are made by high quality compounding pharmacies. We've not been able to identify any local compounding pharmacies that produce intracavernosal injections, so the pharmacies [place] all [place]. Your provider will inform you where your script has been sent. The details for each pharmacy are located below:    [facility]   Located in [place], [place].  Phone: [phone]  Website: [email or web address]  Hours: Monday-Friday 8:30 AM - 8:30  PM EST  Scripts are mailed directly to patients. Patients should receive a phone call shortly after their script has been sent in to confirm payment details and shipping address.  [facility]  Located in [place], [place].  Phone: [phone]  Website: [email or web address]   Hours: Monday-Friday 8 AM - 9 PM  EST  Scripts are mailed directly to patients. Patients should receive a phone call shortly after their script has been sent in to confirm payment details and shipping address.    All men starting intracavernosal injections for the first time must attend an injection teaching appointment with our office team. After receiving your medications, please bring the following with you to your injection teaching appointment:    Medication  Syringes with needles  Cooler with a cold pack to transport the medication after you leave (MEDICATION MUST BE KEPT REFRIGERATED ONCE MIXED)  A supply of over-the-counter non-extended release pseudoephedrine hydrochloride (aka Sudafed).    Some final reminders…    Do not perform more than one injection at a time. You should wait at least 48 hours between injections. Do not increase your dose by more than by 0.1 cc or 10 units at a time.  Always make sure to keep a supply of over-the-counter non-extended release pseudoephedrine hydrochloride (aka Sudafed) readily available just in case.  If you have a firm erection lasting longer than one hour, please take 120 mg of oral pseudoephedrine. If your erection has not resolved in 30 minutes (1.5 hours after starting) please call our office at [phone] and plan to see us in clinic. If it is after hours, please report to your nearest emergency room.        Plan:    -Continue prostate cancer care with Dr. Jurado and Dr. Yang  -Limit fluids for 2 hours before bedtime  -Call or message with any new bothersome urinary symptoms during radiation treatment  -For ED, can consider intracavernosal injections (See above) or use of a vacuum erection device. A good example of a BOY can be found at the following link:    [email or web address]    -Follow up in 3 months or sooner as needed    Miguel Austin MD

## 2024-12-17 PROCEDURE — 77301 RADIOTHERAPY DOSE PLAN IMRT: CPT | Performed by: RADIOLOGY

## 2024-12-17 PROCEDURE — 77300 RADIATION THERAPY DOSE PLAN: CPT | Performed by: RADIOLOGY

## 2024-12-17 PROCEDURE — 77338 DESIGN MLC DEVICE FOR IMRT: CPT | Performed by: RADIOLOGY

## 2024-12-17 PROCEDURE — 77338 DESIGN MLC DEVICE FOR IMRT: CPT | Mod: 26 | Performed by: RADIOLOGY

## 2024-12-17 PROCEDURE — 77300 RADIATION THERAPY DOSE PLAN: CPT | Mod: 26 | Performed by: RADIOLOGY

## 2024-12-17 PROCEDURE — 77301 RADIOTHERAPY DOSE PLAN IMRT: CPT | Mod: 26 | Performed by: RADIOLOGY

## 2024-12-18 ENCOUNTER — HOSPITAL ENCOUNTER (OUTPATIENT)
Dept: RADIATION ONCOLOGY | Facility: MEDICAL CENTER | Age: 69
End: 2024-12-18

## 2024-12-18 ENCOUNTER — HOSPITAL ENCOUNTER (OUTPATIENT)
Dept: RADIATION ONCOLOGY | Facility: MEDICAL CENTER | Age: 69
End: 2024-12-18
Attending: RADIOLOGY
Payer: MEDICARE

## 2024-12-18 VITALS
SYSTOLIC BLOOD PRESSURE: 137 MMHG | WEIGHT: 214 LBS | OXYGEN SATURATION: 97 % | TEMPERATURE: 78.3 F | HEART RATE: 63 BPM | DIASTOLIC BLOOD PRESSURE: 88 MMHG | BODY MASS INDEX: 30.64 KG/M2

## 2024-12-18 LAB
CHEMOTHERAPY INFUSION START DATE: NORMAL
CHEMOTHERAPY RECORDS: 3 GY
CHEMOTHERAPY RECORDS: 6000 CGY
CHEMOTHERAPY RECORDS: NORMAL
CHEMOTHERAPY RX CANCER: NORMAL
DATE 1ST CHEMO CANCER: NORMAL
RAD ONC ARIA COURSE LAST TREATMENT DATE: NORMAL
RAD ONC ARIA COURSE TREATMENT ELAPSED DAYS: NORMAL
RAD ONC ARIA REFERENCE POINT DOSAGE GIVEN TO DATE: 3 GY
RAD ONC ARIA REFERENCE POINT ID: NORMAL
RAD ONC ARIA REFERENCE POINT SESSION DOSAGE GIVEN: 3 GY

## 2024-12-18 PROCEDURE — 77280 THER RAD SIMULAJ FIELD SMPL: CPT | Performed by: RADIOLOGY

## 2024-12-18 PROCEDURE — 77385 HCHG IMRT DELIVERY SIMPLE: CPT | Performed by: RADIOLOGY

## 2024-12-18 PROCEDURE — 77014 PR CT GUIDANCE PLACEMENT RAD THERAPY FIELDS: CPT | Mod: 26 | Performed by: RADIOLOGY

## 2024-12-18 ASSESSMENT — FIBROSIS 4 INDEX: FIB4 SCORE: 1.05

## 2024-12-18 ASSESSMENT — PAIN SCALES - GENERAL: PAINLEVEL_OUTOF10: NO PAIN

## 2024-12-18 NOTE — CT SIMULATION
DATE OF SERVICE: 12/18/2024    Radiation Therapy Episodes       Active Episodes       Radiation Therapy: IMRT                   Radiation Treatments         Plan Last Treated On Elapsed Days Fractions Treated Prescribed Fraction Dose (cGy) Prescribed Total Dose (cGy)    Prostate_Node 12/18/2024 0 @ 12/18/2024 1 of 20 300 6,000                  Reference Point Last Treated On Elapsed Days Most Recent Session Dose (cGy) Total Dose (cGy)    Prostate_Node 12/18/2024 0 @ 12/18/2024 300 300                            First Visit Simple Simulation: Called by [employer] machine to verify treatment parameters including:  treatment site, treatment dose, and treatment setup prior to first treatment. Image derived shifts reviewed in all appropriate planes.  Shifts approved.  Patient treated.    I have personally reviewed the relevant data, performed the target localization, and determined all relevant factors for this patient’s simulation.

## 2024-12-18 NOTE — ON TREATMENT VISIT
"  ON TREATMENT  NOTE  RADIATION ONCOLOGY DEPARTMENT    Patient name:  Manuel Quinn    Primary Physician:  Darling Franklin M.D. MRN: [id number]  CSN: [id number]   Referring physician:  Magdaleno Yang M.D.   : 1955, 69 y.o.     ENCOUNTER DATE:  2024      DIAGNOSIS:  Prostate cancer metastatic to bone (HCC)  Staging form: Prostate, AJCC 8th Edition  - Clinical stage from 2024: Stage IVB (cT3b, cN0, cM1b, PSA: 163, Grade Group: 3) - Signed by Magdaleno Yang M.D. on 2024  Histopathologic type: Adenocarcinoma, NOS  Stage prefix: Initial diagnosis  Prostate specific antigen (PSA) range: 20 or greater  [place] score: 7  Histologic grading system: 5 grade system      TREATMENT SUMMARY:  Course First Treatment Date 2024  Course Last Treatment Date 2024  Radiation Therapy Episodes       Active Episodes       Radiation Therapy: IMRT                   Radiation Treatments         Plan Last Treated On Elapsed Days Fractions Treated Prescribed Fraction Dose (cGy) Prescribed Total Dose (cGy)    Prostate_Node 2024 0 @ 2024 1 of 20 300 6,000                  Reference Point Last Treated On Elapsed Days Most Recent Session Dose (cGy) Total Dose (cGy)    Prostate_Node 2024 0 @ 2024 300 300                               SUBJECTIVE:  Doing well, no major complaints currently    VITAL SIGNS:      2024     2:30 PM 10/4/2024     2:27 PM 10/4/2024     1:44 PM 2024     9:22 AM 2024    10:34 AM 2024     9:01 AM 2024     2:10 PM   Vitals   SYSTOLIC 137 129 102 120 102 127 128   DIASTOLIC 88 72 68 74 68 87 72   Pulse 63 69 79 81 79 81 88   Temperature 25.7 °C (78.3 °F) 35.9 °C (96.6 °F) 36.3 °C (97.4 °F) 36.3 °C (97.3 °F) 36.6 °C (97.9 °F) 36.3 °C (97.3 °F) 36.6 °C (97.9 °F)   Respiration  18 14  12 20 18   Weight 214 215.61 204 199.3 193.23 194.67 194.67   Height  1.78 m (5' 10.08\")  1.829 m (6' 0.01\") 1.829 m (6')  1.829 m (6')   BMI 30.64 kg/m2 30.87 kg/m2 " 27.66 kg/m2 27.02 kg/m2 26.21 kg/m2 26.4 kg/m2 26.4 kg/m2   Pulse Oximetry 97 % 99 % 98 % 98 % 99 % 96 % 96 %     KPS: 90, Able to carry on normal activity; minor signs or symptoms of disease (ECOG equivalent 0)  Encounter Vitals  Temperature: (!) 25.7 °C (78.3 °F)  Temp src: Temporal  Blood Pressure : 137/88  Pulse: 63  Pulse Oximetry: 97 %  Weight: 97.1 kg (214 lb)  Pain Score: No pain      12/18/2024     2:30 PM 10/4/2024     1:44 PM 8/1/2024    10:34 AM 7/29/2024     9:01 AM 7/2/2024     7:55 AM   Pain Assessment   Pain Score NO PAIN NO PAIN NO PAIN NO PAIN NO PAIN          PHYSICAL EXAM:  Physical Exam  Constitutional:       Appearance: Normal appearance.   HENT:      Head: Normocephalic and atraumatic.   Eyes:      Extraocular Movements: Extraocular movements intact.      Conjunctiva/sclera: Conjunctivae normal.   Cardiovascular:      Rate and Rhythm: Normal rate and regular rhythm.   Pulmonary:      Effort: Pulmonary effort is normal. No respiratory distress.   Abdominal:      General: Abdomen is flat.      Palpations: Abdomen is soft.   Musculoskeletal:         General: Normal range of motion.   Neurological:      General: No focal deficit present.      Mental Status: He is alert and oriented to person, place, and time.              12/18/2024     2:33 PM   Toxicity Assessment   Toxicity Assessment Male Pelvis   Fatigue (lethargy, malaise, asthenia) None   Radiation Dermatitis None   Anorexia None   Colitis None   Constipation None   Dehydration None   Diarrhea w/o Colostomy None   Flatulence None   Nausea None   Proctitis None   Vomiting None   RT - Pain due to RT None   Tumor Pain (onset or exacerbation of tumor pain due to treatment) None   Dysuria (painful urination) None   Urinary Frequency Normal   Urinary Urgency None   Bladder Spasms Absent   Incontinence None   Urinary Retention Normal       CURRENT MEDICATIONS:    Current Outpatient Medications:     predniSONE (DELTASONE) 5 MG Tab, Take 1 Tablet by  mouth 2 times a day., Disp: 180 Tablet, Rfl: 3    Abiraterone Acetate (ZYTIGA) 250 MG Tab, Take 4 tablets (1,000 mg) by mouth every day., Disp: 120 Tablet, Rfl: 11    tamsulosin (FLOMAX) 0.4 MG capsule, Take 2 Capsules by mouth 1/2 hour after breakfast for 360 days. Take before bed (not after breakfast), Disp: 180 Capsule, Rfl: 3    Ascorbic Acid (VITAMIN C) 1000 MG Tab, Take 500 mg by mouth every day., Disp: , Rfl:     NON SPECIFIED, Supplement called Quercitian, Disp: , Rfl:     Cholecalciferol (VITAMIN D) 2000 UNIT Tab, Take 2,000 Units by mouth every day., Disp: , Rfl:     Zinc 20 MG Cap, Take 20 mg by mouth every day., Disp: , Rfl:     LABORATORY DATA:   Lab Results   Component Value Date/Time    SODIUM 135 10/02/2024 12:31 PM    POTASSIUM 4.4 10/02/2024 12:31 PM    CHLORIDE 101 10/02/2024 12:31 PM    CO2 23 10/02/2024 12:31 PM    GLUCOSE 156 (H) 10/02/2024 12:31 PM    BUN 22 10/02/2024 12:31 PM    CREATININE 1.01 10/02/2024 12:31 PM       Lab Results   Component Value Date/Time    WBC 7.1 10/02/2024 12:31 PM    RBC 4.77 10/02/2024 12:31 PM    HEMOGLOBIN 15.7 10/02/2024 12:31 PM    HEMATOCRIT 45.3 10/02/2024 12:31 PM    MCV 95.0 10/02/2024 12:31 PM    MCH 32.9 10/02/2024 12:31 PM    MCHC 34.7 10/02/2024 12:31 PM    PLATELETCT 265 10/02/2024 12:31 PM         RADIOLOGY DATA:  No results found.    IMPRESSION:  Cancer Staging   Prostate cancer metastatic to bone (HCC)  Staging form: Prostate, AJCC 8th Edition  - Clinical stage from 7/2/2024: Stage IVB (cT3b, cN0, cM1b, PSA: 163, Grade Group: 3) - Signed by Magdaleno Yang M.D. on 7/2/2024      PLAN:  No change in treatment plan    Disposition:  Treatment plan and imaging reviewed. Questions answered. Continue therapy outlined.     Consuelo Jurado M.D.    No orders of the defined types were placed in this encounter.

## 2024-12-19 ENCOUNTER — HOSPITAL ENCOUNTER (OUTPATIENT)
Dept: RADIATION ONCOLOGY | Facility: MEDICAL CENTER | Age: 69
End: 2024-12-19
Payer: MEDICARE

## 2024-12-19 LAB
CHEMOTHERAPY INFUSION START DATE: NORMAL
CHEMOTHERAPY RECORDS: 3 GY
CHEMOTHERAPY RECORDS: 6000 CGY
CHEMOTHERAPY RECORDS: NORMAL
CHEMOTHERAPY RX CANCER: NORMAL
DATE 1ST CHEMO CANCER: NORMAL
RAD ONC ARIA COURSE LAST TREATMENT DATE: NORMAL
RAD ONC ARIA COURSE TREATMENT ELAPSED DAYS: NORMAL
RAD ONC ARIA REFERENCE POINT DOSAGE GIVEN TO DATE: 6 GY
RAD ONC ARIA REFERENCE POINT ID: NORMAL
RAD ONC ARIA REFERENCE POINT SESSION DOSAGE GIVEN: 3 GY

## 2024-12-19 PROCEDURE — 77385 HCHG IMRT DELIVERY SIMPLE: CPT | Performed by: RADIOLOGY

## 2024-12-19 PROCEDURE — 77014 PR CT GUIDANCE PLACEMENT RAD THERAPY FIELDS: CPT | Mod: 26 | Performed by: RADIOLOGY

## 2024-12-20 ENCOUNTER — HOSPITAL ENCOUNTER (OUTPATIENT)
Dept: RADIATION ONCOLOGY | Facility: MEDICAL CENTER | Age: 69
End: 2024-12-20
Payer: MEDICARE

## 2024-12-20 LAB
CHEMOTHERAPY INFUSION START DATE: NORMAL
CHEMOTHERAPY RECORDS: 3 GY
CHEMOTHERAPY RECORDS: 6000 CGY
CHEMOTHERAPY RECORDS: NORMAL
CHEMOTHERAPY RX CANCER: NORMAL
DATE 1ST CHEMO CANCER: NORMAL
RAD ONC ARIA COURSE LAST TREATMENT DATE: NORMAL
RAD ONC ARIA COURSE TREATMENT ELAPSED DAYS: NORMAL
RAD ONC ARIA REFERENCE POINT DOSAGE GIVEN TO DATE: 9 GY
RAD ONC ARIA REFERENCE POINT ID: NORMAL
RAD ONC ARIA REFERENCE POINT SESSION DOSAGE GIVEN: 3 GY

## 2024-12-20 PROCEDURE — 77385 HCHG IMRT DELIVERY SIMPLE: CPT | Performed by: RADIOLOGY

## 2024-12-20 PROCEDURE — 77014 PR CT GUIDANCE PLACEMENT RAD THERAPY FIELDS: CPT | Mod: 26 | Performed by: RADIOLOGY

## 2024-12-20 PROCEDURE — 77336 RADIATION PHYSICS CONSULT: CPT | Performed by: RADIOLOGY

## 2024-12-23 ENCOUNTER — HOSPITAL ENCOUNTER (OUTPATIENT)
Dept: RADIATION ONCOLOGY | Facility: MEDICAL CENTER | Age: 69
End: 2024-12-23
Payer: MEDICARE

## 2024-12-23 LAB
CHEMOTHERAPY INFUSION START DATE: NORMAL
CHEMOTHERAPY RECORDS: 3 GY
CHEMOTHERAPY RECORDS: 6000 CGY
CHEMOTHERAPY RECORDS: NORMAL
CHEMOTHERAPY RX CANCER: NORMAL
DATE 1ST CHEMO CANCER: NORMAL
RAD ONC ARIA COURSE LAST TREATMENT DATE: NORMAL
RAD ONC ARIA COURSE TREATMENT ELAPSED DAYS: NORMAL
RAD ONC ARIA REFERENCE POINT DOSAGE GIVEN TO DATE: 12 GY
RAD ONC ARIA REFERENCE POINT ID: NORMAL
RAD ONC ARIA REFERENCE POINT SESSION DOSAGE GIVEN: 3 GY

## 2024-12-23 PROCEDURE — 77014 PR CT GUIDANCE PLACEMENT RAD THERAPY FIELDS: CPT | Mod: 26 | Performed by: RADIOLOGY

## 2024-12-23 PROCEDURE — 77385 HCHG IMRT DELIVERY SIMPLE: CPT | Performed by: RADIOLOGY

## 2024-12-24 ENCOUNTER — HOSPITAL ENCOUNTER (OUTPATIENT)
Dept: RADIATION ONCOLOGY | Facility: MEDICAL CENTER | Age: 69
End: 2024-12-24
Payer: MEDICARE

## 2024-12-24 LAB
CHEMOTHERAPY INFUSION START DATE: NORMAL
CHEMOTHERAPY RECORDS: 3 GY
CHEMOTHERAPY RECORDS: 6000 CGY
CHEMOTHERAPY RECORDS: NORMAL
CHEMOTHERAPY RX CANCER: NORMAL
DATE 1ST CHEMO CANCER: NORMAL
RAD ONC ARIA COURSE LAST TREATMENT DATE: NORMAL
RAD ONC ARIA COURSE TREATMENT ELAPSED DAYS: NORMAL
RAD ONC ARIA REFERENCE POINT DOSAGE GIVEN TO DATE: 15 GY
RAD ONC ARIA REFERENCE POINT ID: NORMAL
RAD ONC ARIA REFERENCE POINT SESSION DOSAGE GIVEN: 3 GY

## 2024-12-24 PROCEDURE — 77427 RADIATION TX MANAGEMENT X5: CPT | Performed by: RADIOLOGY

## 2024-12-24 PROCEDURE — 77014 PR CT GUIDANCE PLACEMENT RAD THERAPY FIELDS: CPT | Mod: 26 | Performed by: RADIOLOGY

## 2024-12-24 PROCEDURE — 77385 HCHG IMRT DELIVERY SIMPLE: CPT | Performed by: RADIOLOGY

## 2024-12-26 ENCOUNTER — HOSPITAL ENCOUNTER (OUTPATIENT)
Dept: RADIATION ONCOLOGY | Facility: MEDICAL CENTER | Age: 69
End: 2024-12-26

## 2024-12-26 LAB
CHEMOTHERAPY INFUSION START DATE: NORMAL
CHEMOTHERAPY RECORDS: 3 GY
CHEMOTHERAPY RECORDS: 6000 CGY
CHEMOTHERAPY RECORDS: NORMAL
CHEMOTHERAPY RX CANCER: NORMAL
DATE 1ST CHEMO CANCER: NORMAL
RAD ONC ARIA COURSE LAST TREATMENT DATE: NORMAL
RAD ONC ARIA COURSE TREATMENT ELAPSED DAYS: NORMAL
RAD ONC ARIA REFERENCE POINT DOSAGE GIVEN TO DATE: 18 GY
RAD ONC ARIA REFERENCE POINT ID: NORMAL
RAD ONC ARIA REFERENCE POINT SESSION DOSAGE GIVEN: 3 GY

## 2024-12-26 PROCEDURE — 77385 HCHG IMRT DELIVERY SIMPLE: CPT | Performed by: RADIOLOGY

## 2024-12-26 PROCEDURE — 77014 PR CT GUIDANCE PLACEMENT RAD THERAPY FIELDS: CPT | Mod: 26 | Performed by: RADIOLOGY

## 2024-12-27 ENCOUNTER — HOSPITAL ENCOUNTER (OUTPATIENT)
Dept: RADIATION ONCOLOGY | Facility: MEDICAL CENTER | Age: 69
End: 2024-12-27
Attending: RADIOLOGY
Payer: MEDICARE

## 2024-12-27 ENCOUNTER — HOSPITAL ENCOUNTER (OUTPATIENT)
Dept: RADIATION ONCOLOGY | Facility: MEDICAL CENTER | Age: 69
End: 2024-12-27

## 2024-12-27 VITALS
WEIGHT: 213.19 LBS | SYSTOLIC BLOOD PRESSURE: 134 MMHG | OXYGEN SATURATION: 97 % | DIASTOLIC BLOOD PRESSURE: 72 MMHG | BODY MASS INDEX: 30.52 KG/M2 | TEMPERATURE: 97 F | HEART RATE: 81 BPM

## 2024-12-27 LAB
CHEMOTHERAPY INFUSION START DATE: NORMAL
CHEMOTHERAPY RECORDS: 3 GY
CHEMOTHERAPY RECORDS: 6000 CGY
CHEMOTHERAPY RECORDS: NORMAL
CHEMOTHERAPY RX CANCER: NORMAL
DATE 1ST CHEMO CANCER: NORMAL
RAD ONC ARIA COURSE LAST TREATMENT DATE: NORMAL
RAD ONC ARIA COURSE TREATMENT ELAPSED DAYS: NORMAL
RAD ONC ARIA REFERENCE POINT DOSAGE GIVEN TO DATE: 21 GY
RAD ONC ARIA REFERENCE POINT ID: NORMAL
RAD ONC ARIA REFERENCE POINT SESSION DOSAGE GIVEN: 3 GY

## 2024-12-27 PROCEDURE — 77014 PR CT GUIDANCE PLACEMENT RAD THERAPY FIELDS: CPT | Mod: 26 | Performed by: RADIOLOGY

## 2024-12-27 PROCEDURE — 77385 HCHG IMRT DELIVERY SIMPLE: CPT | Performed by: RADIOLOGY

## 2024-12-27 ASSESSMENT — PAIN SCALES - GENERAL: PAINLEVEL_OUTOF10: NO PAIN

## 2024-12-27 ASSESSMENT — FIBROSIS 4 INDEX: FIB4 SCORE: 1.05

## 2024-12-27 NOTE — RADIATION PLANNING NOTES
DATE OF SERVICE: 12/10/2024    DIAGNOSIS:  Prostate cancer metastatic to bone (HCC)  Staging form: Prostate, AJCC 8th Edition  - Clinical stage from 7/2/2024: Stage IVB (cT3b, cN0, cM1b, PSA: 163, Grade Group: 3) - Signed by Magdaleno Yang M.D. on 7/2/2024  Histopathologic type: Adenocarcinoma, NOS  Stage prefix: Initial diagnosis  Prostate specific antigen (PSA) range: 20 or greater  [place] score: 7  Histologic grading system: 5 grade system       DATE OF SERVICE: 12/10/2024    TYPE OF SIMULATION: Pelvis    GOAL OF TREATMENT:   [x] Curative  [] Palliative  [] Oligometastatic    CONTRAST:    [] IV Contrast*  [] Small Bowel  [] Rectal  [] Urethral              POSITION:    [x]  Supine  [] Prone with belly board    COMPLEX:  [x] Complex Blocking   []Arcs  [] Custom Blocks  [] >3 Sites    PROCEDURE: Patient positioned on CT table in Vac-Светлана immobilization device with or without belly board depending on position. CT acquired thorough the entire volume of interest.  Images reviewed and exported to treatment planning system.    I have personally reviewed the relevant data, performed the target localization, and determined all relevant factors for this patient’s simulation.    *Omnipaque 80 -100cc IVP in conjunction with 500cc NS

## 2024-12-27 NOTE — ON TREATMENT VISIT
"  ON TREATMENT  NOTE  RADIATION ONCOLOGY DEPARTMENT    Patient name:  Manuel Quinn    Primary Physician:  Darling Franklin M.D. MRN: [id number]  CSN: [id number]   Referring physician:  Magdaleno Yang M.D.   : 1955, 69 y.o.     ENCOUNTER DATE:  2024      DIAGNOSIS:  Prostate cancer metastatic to bone (HCC)  Staging form: Prostate, AJCC 8th Edition  - Clinical stage from 2024: Stage IVB (cT3b, cN0, cM1b, PSA: 163, Grade Group: 3) - Signed by Magdaleno Yang M.D. on 2024  Histopathologic type: Adenocarcinoma, NOS  Stage prefix: Initial diagnosis  Prostate specific antigen (PSA) range: 20 or greater  [place] score: 7  Histologic grading system: 5 grade system      TREATMENT SUMMARY:  Course First Treatment Date 2024  Course Last Treatment Date 2024  Radiation Therapy Episodes       Active Episodes       Radiation Therapy: IMRT                   Radiation Treatments         Plan Last Treated On Elapsed Days Fractions Treated Prescribed Fraction Dose (cGy) Prescribed Total Dose (cGy)    Prostate_Node 2024 8 @ 2024 6 of 20 300 6,000                  Reference Point Last Treated On Elapsed Days Most Recent Session Dose (cGy) Total Dose (cGy)    Prostate_Node 2024 8 @ 2024 300 1,800                               SUBJECTIVE:  Well, voiding well, no bowel issues, no dysuria.    VITAL SIGNS:      2024     2:30 PM 10/4/2024     2:27 PM 10/4/2024     1:44 PM 2024     9:22 AM 2024    10:34 AM 2024     9:01 AM 2024     2:10 PM   Vitals   SYSTOLIC 137 129 102 120 102 127 128   DIASTOLIC 88 72 68 74 68 87 72   Pulse 63 69 79 81 79 81 88   Temperature 25.7 °C (78.3 °F) 35.9 °C (96.6 °F) 36.3 °C (97.4 °F) 36.3 °C (97.3 °F) 36.6 °C (97.9 °F) 36.3 °C (97.3 °F) 36.6 °C (97.9 °F)   Respiration  18 14  12 20 18   Weight 214 215.61 204 199.3 193.23 194.67 194.67   Height  1.78 m (5' 10.08\")  1.829 m (6' 0.01\") 1.829 m (6')  1.829 m (6')   BMI 30.64 kg/m2 30.87 " kg/m2 27.66 kg/m2 27.02 kg/m2 26.21 kg/m2 26.4 kg/m2 26.4 kg/m2   Pulse Oximetry 97 % 99 % 98 % 98 % 99 % 96 % 96 %     KPS: 90, Able to carry on normal activity; minor signs or symptoms of disease (ECOG equivalent 0)         12/18/2024     2:30 PM 10/4/2024     1:44 PM 8/1/2024    10:34 AM 7/29/2024     9:01 AM 7/2/2024     7:55 AM   Pain Assessment   Pain Score NO PAIN NO PAIN NO PAIN NO PAIN NO PAIN          PHYSICAL EXAM:  Physical Exam  Constitutional:       Appearance: Normal appearance.   Cardiovascular:      Rate and Rhythm: Normal rate.   Abdominal:      General: Abdomen is flat.      Palpations: Abdomen is soft.   Neurological:      Mental Status: He is alert.              12/18/2024     2:33 PM   Toxicity Assessment   Toxicity Assessment Male Pelvis   Fatigue (lethargy, malaise, asthenia) None   Radiation Dermatitis None   Anorexia None   Colitis None   Constipation None   Dehydration None   Diarrhea w/o Colostomy None   Flatulence None   Nausea None   Proctitis None   Vomiting None   RT - Pain due to RT None   Tumor Pain (onset or exacerbation of tumor pain due to treatment) None   Dysuria (painful urination) None   Urinary Frequency Normal   Urinary Urgency None   Bladder Spasms Absent   Incontinence None   Urinary Retention Normal       CURRENT MEDICATIONS:    Current Outpatient Medications:     predniSONE (DELTASONE) 5 MG Tab, Take 1 Tablet by mouth 2 times a day., Disp: 180 Tablet, Rfl: 3    Abiraterone Acetate (ZYTIGA) 250 MG Tab, Take 4 tablets (1,000 mg) by mouth every day., Disp: 120 Tablet, Rfl: 11    tamsulosin (FLOMAX) 0.4 MG capsule, Take 2 Capsules by mouth 1/2 hour after breakfast for 360 days. Take before bed (not after breakfast), Disp: 180 Capsule, Rfl: 3    Ascorbic Acid (VITAMIN C) 1000 MG Tab, Take 500 mg by mouth every day., Disp: , Rfl:     NON SPECIFIED, Supplement called Quercitian, Disp: , Rfl:     Cholecalciferol (VITAMIN D) 2000 UNIT Tab, Take 2,000 Units by mouth every day.,  Disp: , Rfl:     Zinc 20 MG Cap, Take 20 mg by mouth every day., Disp: , Rfl:     LABORATORY DATA:   Lab Results   Component Value Date/Time    SODIUM 135 10/02/2024 12:31 PM    POTASSIUM 4.4 10/02/2024 12:31 PM    CHLORIDE 101 10/02/2024 12:31 PM    CO2 23 10/02/2024 12:31 PM    GLUCOSE 156 (H) 10/02/2024 12:31 PM    BUN 22 10/02/2024 12:31 PM    CREATININE 1.01 10/02/2024 12:31 PM       Lab Results   Component Value Date/Time    WBC 7.1 10/02/2024 12:31 PM    RBC 4.77 10/02/2024 12:31 PM    HEMOGLOBIN 15.7 10/02/2024 12:31 PM    HEMATOCRIT 45.3 10/02/2024 12:31 PM    MCV 95.0 10/02/2024 12:31 PM    MCH 32.9 10/02/2024 12:31 PM    MCHC 34.7 10/02/2024 12:31 PM    PLATELETCT 265 10/02/2024 12:31 PM         RADIOLOGY DATA:  No results found.    IMPRESSION:  Cancer Staging   Prostate cancer metastatic to bone (HCC)  Staging form: Prostate, AJCC 8th Edition  - Clinical stage from 7/2/2024: Stage IVB (cT3b, cN0, cM1b, PSA: 163, Grade Group: 3) - Signed by Magdaleno Yang M.D. on 7/2/2024      PLAN:  No change in treatment plan    Disposition:  Treatment plan and imaging reviewed. Questions answered. Continue therapy outlined.     Consuelo Jurado M.D.    No orders of the defined types were placed in this encounter.

## 2024-12-27 NOTE — RADIATION PLANNING NOTES
Clinical Treatment Planning Note    DATE OF SERVICE: 12/10/2024    DIAGNOSIS:  Prostate cancer metastatic to bone (HCC)  Staging form: Prostate, AJCC 8th Edition  - Clinical stage from 7/2/2024: Stage IVB (cT3b, cN0, cM1b, PSA: 163, Grade Group: 3) - Signed by Magdaleno Yang M.D. on 7/2/2024  Histopathologic type: Adenocarcinoma, NOS  Stage prefix: Initial diagnosis  Prostate specific antigen (PSA) range: 20 or greater  [place] score: 7  Histologic grading system: 5 grade system         IMAGING REVIEWED:  [x] CT     [] MRI     [x] PET/CT     [] BONE SCAN     [] MAMMO     [] OTHER      TREATMENT INTENT:   [x] CURATIVE     [] MAINTENANCE     []  PALLIATIVE      []  SUPPORTIVE     []  PROPHYLACTIC     [] BENIGN     []  CONSOLIDATIVE      [] DEFINITIVE   []  OLOGIMETASTATIC      LINE OF TREATMENT:  [] ADJUVANT   [x] DEFINITIVE   [] NEOADJUVANT   [] RE-TREATMENT      TECHNIQUE PLANNED:  [x] IMRT   [] 3D   [] SBRT   [] SRS/SRT   [] HDR   [] ELECTRON       IMRT JUSTIFICATION:  []   An immediately adjacent area has been previously irradiated and abutting portals must be established with high precision.    []  Dose escalation is planned to deliver radiation doses in excess of those commonly utilized for similar tumors with conventional treatment.    [x]  The target volume is concave or convex, and the critical normal tissues are within or around that convexity or concavity.    [x]  The target volume is in close proximity to critical structures that must be protected.    [x]  The volume of interest must be covered with narrow margins to adequately protect  immediately adjacent structures.      FIELDS & BLOCKING:  [x] COMPLEX BLOCKS     []  = 3 TX AREAS     []  ARCS     []  CUSTOM SHEILD        []  SIMPLE BLOCK      CHEMOTHERAPY:  []  CONCURRENT     []  INDUCTION     [] SEQUENTIAL     []  <30 DAYS FROM XRT      NOTES:  OAR CONSTRAINTS: (GUIDELINES ONLY NOT ABSOLUTE)   Target Prescribed Coverage   % of PTV covered by 95%  (cGy) of RX Dose       COY Goal   Rectum V30Gy < 60%   Rectum V45Gy < 40%   Rectum V53Gy < 25%   Rectum V57Gy < 15%   Rectum V59Gy < 5%   Bladder V45Gy < 45%   Bladder V53Gy < 30%   Bladder V57Gy < 20%   Bladder V59Gy < 10%   Femoral Heads Max Dose < 35Gy   Bowel V40Gy < 17cc   Bowel V34Gy < 195cc   *CÉSAR, RTOG 0415, PROFIT

## 2024-12-30 ENCOUNTER — HOSPITAL ENCOUNTER (OUTPATIENT)
Dept: RADIATION ONCOLOGY | Facility: MEDICAL CENTER | Age: 69
End: 2024-12-30

## 2024-12-30 LAB
CHEMOTHERAPY INFUSION START DATE: NORMAL
CHEMOTHERAPY RECORDS: 3 GY
CHEMOTHERAPY RECORDS: 6000 CGY
CHEMOTHERAPY RECORDS: NORMAL
CHEMOTHERAPY RX CANCER: NORMAL
DATE 1ST CHEMO CANCER: NORMAL
RAD ONC ARIA COURSE LAST TREATMENT DATE: NORMAL
RAD ONC ARIA COURSE TREATMENT ELAPSED DAYS: NORMAL
RAD ONC ARIA REFERENCE POINT DOSAGE GIVEN TO DATE: 24 GY
RAD ONC ARIA REFERENCE POINT ID: NORMAL
RAD ONC ARIA REFERENCE POINT SESSION DOSAGE GIVEN: 3 GY

## 2024-12-30 PROCEDURE — 77336 RADIATION PHYSICS CONSULT: CPT | Performed by: RADIOLOGY

## 2024-12-30 PROCEDURE — 77014 PR CT GUIDANCE PLACEMENT RAD THERAPY FIELDS: CPT | Mod: 26 | Performed by: RADIOLOGY

## 2024-12-30 PROCEDURE — 77385 HCHG IMRT DELIVERY SIMPLE: CPT | Performed by: RADIOLOGY

## 2024-12-31 ENCOUNTER — HOSPITAL ENCOUNTER (OUTPATIENT)
Dept: RADIATION ONCOLOGY | Facility: MEDICAL CENTER | Age: 69
End: 2024-12-31
Payer: MEDICARE

## 2024-12-31 LAB

## 2024-12-31 PROCEDURE — 77014 PR CT GUIDANCE PLACEMENT RAD THERAPY FIELDS: CPT | Mod: 26 | Performed by: RADIOLOGY

## 2024-12-31 PROCEDURE — 77385 HCHG IMRT DELIVERY SIMPLE: CPT | Performed by: RADIOLOGY

## 2025-01-02 ENCOUNTER — HOSPITAL ENCOUNTER (OUTPATIENT)
Dept: RADIATION ONCOLOGY | Facility: MEDICAL CENTER | Age: 70
End: 2025-01-02
Payer: MEDICARE

## 2025-01-02 ENCOUNTER — HOSPITAL ENCOUNTER (OUTPATIENT)
Dept: RADIATION ONCOLOGY | Facility: MEDICAL CENTER | Age: 70
End: 2025-01-02
Attending: RADIOLOGY
Payer: MEDICARE

## 2025-01-02 VITALS
RESPIRATION RATE: 18 BRPM | OXYGEN SATURATION: 95 % | TEMPERATURE: 96.1 F | BODY MASS INDEX: 30.39 KG/M2 | HEART RATE: 83 BPM | DIASTOLIC BLOOD PRESSURE: 71 MMHG | WEIGHT: 212.3 LBS | SYSTOLIC BLOOD PRESSURE: 132 MMHG

## 2025-01-02 DIAGNOSIS — C79.51 PROSTATE CANCER METASTATIC TO BONE (HCC): ICD-10-CM

## 2025-01-02 DIAGNOSIS — C61 PROSTATE CANCER METASTATIC TO BONE (HCC): ICD-10-CM

## 2025-01-02 LAB
CHEMOTHERAPY INFUSION START DATE: NORMAL
CHEMOTHERAPY RECORDS: 3 GY
CHEMOTHERAPY RECORDS: 6000 CGY
CHEMOTHERAPY RECORDS: NORMAL
CHEMOTHERAPY RX CANCER: NORMAL
DATE 1ST CHEMO CANCER: NORMAL
RAD ONC ARIA COURSE LAST TREATMENT DATE: NORMAL
RAD ONC ARIA COURSE TREATMENT ELAPSED DAYS: NORMAL
RAD ONC ARIA REFERENCE POINT DOSAGE GIVEN TO DATE: 30 GY
RAD ONC ARIA REFERENCE POINT ID: NORMAL
RAD ONC ARIA REFERENCE POINT SESSION DOSAGE GIVEN: 3 GY

## 2025-01-02 PROCEDURE — 77385 HCHG IMRT DELIVERY SIMPLE: CPT | Performed by: RADIOLOGY

## 2025-01-02 PROCEDURE — 77014 PR CT GUIDANCE PLACEMENT RAD THERAPY FIELDS: CPT | Mod: 26 | Performed by: RADIOLOGY

## 2025-01-02 PROCEDURE — 77427 RADIATION TX MANAGEMENT X5: CPT | Performed by: RADIOLOGY

## 2025-01-02 RX ORDER — METHYLPREDNISOLONE 4 MG/1
TABLET ORAL
Qty: 21 TABLET | Refills: 0 | Status: SHIPPED | OUTPATIENT
Start: 2025-01-02

## 2025-01-02 ASSESSMENT — FIBROSIS 4 INDEX: FIB4 SCORE: 1.05

## 2025-01-02 ASSESSMENT — PAIN SCALES - GENERAL: PAINLEVEL_OUTOF10: NO PAIN

## 2025-01-02 NOTE — ON TREATMENT VISIT
ON TREATMENT  NOTE  RADIATION ONCOLOGY DEPARTMENT    Patient name:  Manuel Quinn    Primary Physician:  Darling Franklin M.D. MRN: [id number]  CSN: [id number]   Referring physician:  Magdaleno Yang M.D.   : 1955, 69 y.o.     ENCOUNTER DATE:  2025      DIAGNOSIS:  Prostate cancer metastatic to bone (HCC)  Staging form: Prostate, AJCC 8th Edition  - Clinical stage from 2024: Stage IVB (cT3b, cN0, cM1b, PSA: 163, Grade Group: 3) - Signed by Magdaleno Yang M.D. on 2024  Histopathologic type: Adenocarcinoma, NOS  Stage prefix: Initial diagnosis  Prostate specific antigen (PSA) range: 20 or greater  [place] score: 7  Histologic grading system: 5 grade system      TREATMENT SUMMARY:  Course First Treatment Date 2024  Course Last Treatment Date 2025  Radiation Therapy Episodes       Active Episodes       Radiation Therapy: IMRT                   Radiation Treatments         Plan Last Treated On Elapsed Days Fractions Treated Prescribed Fraction Dose (cGy) Prescribed Total Dose (cGy)    Prostate_Node 2025 15 @ 2025 10 of 20 300 6,000                  Reference Point Last Treated On Elapsed Days Most Recent Session Dose (cGy) Total Dose (cGy)    Prostate_Node 2025 15 @ 2025 300 3,000                               SUBJECTIVE:  Significant flare of urinary symptoms with increase in urinary frequency, as well as increase in bowel frequency.  No dysuria.  Takes Flomax 0.8 mg in evenings.  However waking up about every 2 hours.  Also having frequency in the daytime.    VITAL SIGNS:      2025    11:33 AM 2024    11:59 AM 2024     2:30 PM 10/4/2024     2:27 PM 10/4/2024     1:44 PM 2024     9:22 AM 2024    10:34 AM   Vitals   SYSTOLIC 132 134 137 129 102 120 102   DIASTOLIC 71 72 88 72 68 74 68   Pulse 83 81 63 69 79 81 79   Temperature 35.6 °C (96.1 °F) 36.1 °C (97 °F) 25.7 °C (78.3 °F) 35.9 °C (96.6 °F) 36.3 °C (97.4 °F) 36.3 °C (97.3 °F) 36.6 °C (97.9  "°F)   Respiration 18   18 14  12   Weight 212.3 213.19 214 215.61 204 199.3 193.23   Height    1.78 m (5' 10.08\")  1.829 m (6' 0.01\") 1.829 m (6')   BMI 30.39 kg/m2 30.52 kg/m2 30.64 kg/m2 30.87 kg/m2 27.66 kg/m2 27.02 kg/m2 26.21 kg/m2   Pulse Oximetry 95 % 97 % 97 % 99 % 98 % 98 % 99 %     KPS: 90, Able to carry on normal activity; minor signs or symptoms of disease (ECOG equivalent 0)  Encounter Vitals  Temperature: (!) 35.6 °C (96.1 °F) (MD updated)  Temp src: Temporal  Blood Pressure : 132/71  Pulse: 83  Respiration: 18  Pulse Oximetry: 95 %  Weight: 96.3 kg (212 lb 4.9 oz)  Weight Source: Stand Up Scale  Pain Score: No pain      1/2/2025    11:33 AM 12/27/2024    11:59 AM 12/18/2024     2:30 PM 10/4/2024     1:44 PM 8/1/2024    10:34 AM 7/29/2024     9:01 AM   Pain Assessment   Pain Score NO PAIN NO PAIN NO PAIN NO PAIN NO PAIN NO PAIN          PHYSICAL EXAM:  Physical Exam  Constitutional:       Appearance: Normal appearance.   Cardiovascular:      Rate and Rhythm: Normal rate.   Pulmonary:      Effort: Pulmonary effort is normal. No respiratory distress.   Neurological:      General: No focal deficit present.      Mental Status: He is alert and oriented to person, place, and time.              1/2/2025    11:35 AM 12/27/2024    12:00 PM 12/18/2024     2:33 PM   Toxicity Assessment   Toxicity Assessment Male Pelvis Male Pelvis Male Pelvis   Fatigue (lethargy, malaise, asthenia) Increased fatigue over baseline, but not altering normal activities None None   Radiation Dermatitis None None None   Anorexia None None None   Colitis None None None   Constipation None None None   Dehydration None None None   Diarrhea w/o Colostomy None None None   Flatulence None None None   Nausea None None None   Proctitis None None None   Vomiting None None None   RT - Pain due to RT None None None   Tumor Pain (onset or exacerbation of tumor pain due to treatment) None None None   Dysuria (painful urination) None None None "   Urinary Frequency Increase >2x normal but <hourly Normal Normal   Urinary Urgency Present None None   Bladder Spasms Absent Absent Absent   Incontinence With coughing, sneezing, etc. None None   Urinary Retention Normal Normal Normal       CURRENT MEDICATIONS:    Current Outpatient Medications:     methylPREDNISolone (MEDROL DOSEPAK) 4 MG Tablet Therapy Pack, As directed on the packaging label., Disp: 21 Tablet, Rfl: 0    predniSONE (DELTASONE) 5 MG Tab, Take 1 Tablet by mouth 2 times a day., Disp: 180 Tablet, Rfl: 3    Abiraterone Acetate (ZYTIGA) 250 MG Tab, Take 4 tablets (1,000 mg) by mouth every day., Disp: 120 Tablet, Rfl: 11    tamsulosin (FLOMAX) 0.4 MG capsule, Take 2 Capsules by mouth 1/2 hour after breakfast for 360 days. Take before bed (not after breakfast), Disp: 180 Capsule, Rfl: 3    Ascorbic Acid (VITAMIN C) 1000 MG Tab, Take 500 mg by mouth every day., Disp: , Rfl:     NON SPECIFIED, Supplement called Quercitian, Disp: , Rfl:     Cholecalciferol (VITAMIN D) 2000 UNIT Tab, Take 2,000 Units by mouth every day., Disp: , Rfl:     Zinc 20 MG Cap, Take 20 mg by mouth every day., Disp: , Rfl:     LABORATORY DATA:   Lab Results   Component Value Date/Time    SODIUM 135 10/02/2024 12:31 PM    POTASSIUM 4.4 10/02/2024 12:31 PM    CHLORIDE 101 10/02/2024 12:31 PM    CO2 23 10/02/2024 12:31 PM    GLUCOSE 156 (H) 10/02/2024 12:31 PM    BUN 22 10/02/2024 12:31 PM    CREATININE 1.01 10/02/2024 12:31 PM       Lab Results   Component Value Date/Time    WBC 7.1 10/02/2024 12:31 PM    RBC 4.77 10/02/2024 12:31 PM    HEMOGLOBIN 15.7 10/02/2024 12:31 PM    HEMATOCRIT 45.3 10/02/2024 12:31 PM    MCV 95.0 10/02/2024 12:31 PM    MCH 32.9 10/02/2024 12:31 PM    MCHC 34.7 10/02/2024 12:31 PM    PLATELETCT 265 10/02/2024 12:31 PM         RADIOLOGY DATA:  No results found.    IMPRESSION:  Cancer Staging   Prostate cancer metastatic to bone (HCC)  Staging form: Prostate, AJCC 8th Edition  - Clinical stage from 7/2/2024:  Stage IVB (cT3b, cN0, cM1b, PSA: 163, Grade Group: 3) - Signed by Magdaleno Yang M.D. on 7/2/2024      PLAN:  Symptomatic - prescription given and/or additional testing required.  Will prescribe Medrol Dosepak.  Also change Flomax frequency to a.m. and p.m. 0.4 mg each dose to see if this helps it over the day.    Disposition:  Treatment plan and imaging reviewed. Questions answered. Continue therapy outlined.     Consuelo Jurado M.D.    Orders Placed This Encounter    methylPREDNISolone (MEDROL DOSEPAK) 4 MG Tablet Therapy Pack

## 2025-01-03 ENCOUNTER — HOSPITAL ENCOUNTER (OUTPATIENT)
Dept: RADIATION ONCOLOGY | Facility: MEDICAL CENTER | Age: 70
End: 2025-01-03
Payer: MEDICARE

## 2025-01-03 LAB
CHEMOTHERAPY INFUSION START DATE: NORMAL
CHEMOTHERAPY RECORDS: 3 GY
CHEMOTHERAPY RECORDS: 6000 CGY
CHEMOTHERAPY RECORDS: NORMAL
CHEMOTHERAPY RX CANCER: NORMAL
DATE 1ST CHEMO CANCER: NORMAL
RAD ONC ARIA COURSE LAST TREATMENT DATE: NORMAL
RAD ONC ARIA COURSE TREATMENT ELAPSED DAYS: NORMAL
RAD ONC ARIA REFERENCE POINT DOSAGE GIVEN TO DATE: 33 GY
RAD ONC ARIA REFERENCE POINT ID: NORMAL
RAD ONC ARIA REFERENCE POINT SESSION DOSAGE GIVEN: 3 GY

## 2025-01-03 PROCEDURE — 77014 PR CT GUIDANCE PLACEMENT RAD THERAPY FIELDS: CPT | Mod: 26 | Performed by: RADIOLOGY

## 2025-01-03 PROCEDURE — 77385 HCHG IMRT DELIVERY SIMPLE: CPT | Performed by: RADIOLOGY

## 2025-01-06 ENCOUNTER — HOSPITAL ENCOUNTER (OUTPATIENT)
Dept: RADIATION ONCOLOGY | Facility: MEDICAL CENTER | Age: 70
End: 2025-01-06
Payer: MEDICARE

## 2025-01-06 LAB
CHEMOTHERAPY INFUSION START DATE: NORMAL
CHEMOTHERAPY RECORDS: 3 GY
CHEMOTHERAPY RECORDS: 6000 CGY
CHEMOTHERAPY RECORDS: NORMAL
CHEMOTHERAPY RX CANCER: NORMAL
DATE 1ST CHEMO CANCER: NORMAL
RAD ONC ARIA COURSE LAST TREATMENT DATE: NORMAL
RAD ONC ARIA COURSE TREATMENT ELAPSED DAYS: NORMAL
RAD ONC ARIA REFERENCE POINT DOSAGE GIVEN TO DATE: 36 GY
RAD ONC ARIA REFERENCE POINT ID: NORMAL
RAD ONC ARIA REFERENCE POINT SESSION DOSAGE GIVEN: 3 GY

## 2025-01-06 PROCEDURE — 77014 PR CT GUIDANCE PLACEMENT RAD THERAPY FIELDS: CPT | Mod: 26 | Performed by: RADIOLOGY

## 2025-01-06 PROCEDURE — 77385 HCHG IMRT DELIVERY SIMPLE: CPT | Performed by: RADIOLOGY

## 2025-01-07 ENCOUNTER — HOSPITAL ENCOUNTER (OUTPATIENT)
Dept: LAB | Facility: MEDICAL CENTER | Age: 70
End: 2025-01-07
Attending: INTERNAL MEDICINE
Payer: MEDICARE

## 2025-01-07 ENCOUNTER — HOSPITAL ENCOUNTER (OUTPATIENT)
Dept: RADIATION ONCOLOGY | Facility: MEDICAL CENTER | Age: 70
End: 2025-01-07
Payer: MEDICARE

## 2025-01-07 DIAGNOSIS — C61 PROSTATE CANCER METASTATIC TO BONE (HCC): ICD-10-CM

## 2025-01-07 DIAGNOSIS — C79.51 PROSTATE CANCER METASTATIC TO BONE (HCC): ICD-10-CM

## 2025-01-07 LAB
ALBUMIN SERPL BCP-MCNC: 4.2 G/DL (ref 3.2–4.9)
ALBUMIN/GLOB SERPL: 1.4 G/DL
ALP SERPL-CCNC: 91 U/L (ref 30–99)
ALT SERPL-CCNC: 17 U/L (ref 2–50)
ANION GAP SERPL CALC-SCNC: 13 MMOL/L (ref 7–16)
AST SERPL-CCNC: 12 U/L (ref 12–45)
BASOPHILS # BLD AUTO: 0.4 % (ref 0–1.8)
BASOPHILS # BLD: 0.04 K/UL (ref 0–0.12)
BILIRUB SERPL-MCNC: 0.4 MG/DL (ref 0.1–1.5)
BUN SERPL-MCNC: 36 MG/DL (ref 8–22)
CALCIUM ALBUM COR SERPL-MCNC: 9.4 MG/DL (ref 8.5–10.5)
CALCIUM SERPL-MCNC: 9.6 MG/DL (ref 8.5–10.5)
CHEMOTHERAPY INFUSION START DATE: NORMAL
CHEMOTHERAPY RECORDS: 3 GY
CHEMOTHERAPY RECORDS: 6000 CGY
CHEMOTHERAPY RECORDS: NORMAL
CHEMOTHERAPY RX CANCER: NORMAL
CHLORIDE SERPL-SCNC: 99 MMOL/L (ref 96–112)
CO2 SERPL-SCNC: 24 MMOL/L (ref 20–33)
CREAT SERPL-MCNC: 0.92 MG/DL (ref 0.5–1.4)
DATE 1ST CHEMO CANCER: NORMAL
EOSINOPHIL # BLD AUTO: 0.19 K/UL (ref 0–0.51)
EOSINOPHIL NFR BLD: 2 % (ref 0–6.9)
ERYTHROCYTE [DISTWIDTH] IN BLOOD BY AUTOMATED COUNT: 42.1 FL (ref 35.9–50)
GFR SERPLBLD CREATININE-BSD FMLA CKD-EPI: 89 ML/MIN/1.73 M 2
GLOBULIN SER CALC-MCNC: 2.9 G/DL (ref 1.9–3.5)
GLUCOSE SERPL-MCNC: 107 MG/DL (ref 65–99)
HCT VFR BLD AUTO: 46.5 % (ref 42–52)
HGB BLD-MCNC: 16 G/DL (ref 14–18)
IMM GRANULOCYTES # BLD AUTO: 0.15 K/UL (ref 0–0.11)
IMM GRANULOCYTES NFR BLD AUTO: 1.6 % (ref 0–0.9)
LYMPHOCYTES # BLD AUTO: 0.61 K/UL (ref 1–4.8)
LYMPHOCYTES NFR BLD: 6.5 % (ref 22–41)
MCH RBC QN AUTO: 32.6 PG (ref 27–33)
MCHC RBC AUTO-ENTMCNC: 34.4 G/DL (ref 32.3–36.5)
MCV RBC AUTO: 94.7 FL (ref 81.4–97.8)
MONOCYTES # BLD AUTO: 1.11 K/UL (ref 0–0.85)
MONOCYTES NFR BLD AUTO: 11.8 % (ref 0–13.4)
NEUTROPHILS # BLD AUTO: 7.34 K/UL (ref 1.82–7.42)
NEUTROPHILS NFR BLD: 77.7 % (ref 44–72)
NRBC # BLD AUTO: 0 K/UL
NRBC BLD-RTO: 0 /100 WBC (ref 0–0.2)
PLATELET # BLD AUTO: 252 K/UL (ref 164–446)
PMV BLD AUTO: 10.1 FL (ref 9–12.9)
POTASSIUM SERPL-SCNC: 4.8 MMOL/L (ref 3.6–5.5)
PROT SERPL-MCNC: 7.1 G/DL (ref 6–8.2)
PSA SERPL DL<=0.01 NG/ML-MCNC: 0.67 NG/ML (ref 0–4)
RAD ONC ARIA COURSE LAST TREATMENT DATE: NORMAL
RAD ONC ARIA COURSE TREATMENT ELAPSED DAYS: NORMAL
RAD ONC ARIA REFERENCE POINT DOSAGE GIVEN TO DATE: 39 GY
RAD ONC ARIA REFERENCE POINT ID: NORMAL
RAD ONC ARIA REFERENCE POINT SESSION DOSAGE GIVEN: 3 GY
RBC # BLD AUTO: 4.91 M/UL (ref 4.7–6.1)
SODIUM SERPL-SCNC: 136 MMOL/L (ref 135–145)
WBC # BLD AUTO: 9.4 K/UL (ref 4.8–10.8)

## 2025-01-07 PROCEDURE — 85025 COMPLETE CBC W/AUTO DIFF WBC: CPT

## 2025-01-07 PROCEDURE — 84153 ASSAY OF PSA TOTAL: CPT

## 2025-01-07 PROCEDURE — 36415 COLL VENOUS BLD VENIPUNCTURE: CPT

## 2025-01-07 PROCEDURE — 77385 HCHG IMRT DELIVERY SIMPLE: CPT | Performed by: RADIOLOGY

## 2025-01-07 PROCEDURE — 80053 COMPREHEN METABOLIC PANEL: CPT

## 2025-01-07 PROCEDURE — 77336 RADIATION PHYSICS CONSULT: CPT | Performed by: RADIOLOGY

## 2025-01-07 PROCEDURE — 77014 PR CT GUIDANCE PLACEMENT RAD THERAPY FIELDS: CPT | Mod: 26 | Performed by: RADIOLOGY

## 2025-01-08 ENCOUNTER — HOSPITAL ENCOUNTER (OUTPATIENT)
Dept: RADIATION ONCOLOGY | Facility: MEDICAL CENTER | Age: 70
End: 2025-01-08
Payer: MEDICARE

## 2025-01-08 ENCOUNTER — HOSPITAL ENCOUNTER (OUTPATIENT)
Dept: RADIATION ONCOLOGY | Facility: MEDICAL CENTER | Age: 70
End: 2025-01-08
Attending: RADIOLOGY
Payer: MEDICARE

## 2025-01-08 VITALS
OXYGEN SATURATION: 98 % | HEART RATE: 69 BPM | DIASTOLIC BLOOD PRESSURE: 70 MMHG | SYSTOLIC BLOOD PRESSURE: 137 MMHG | WEIGHT: 208.78 LBS | BODY MASS INDEX: 29.89 KG/M2 | RESPIRATION RATE: 18 BRPM | TEMPERATURE: 96.6 F

## 2025-01-08 LAB
CHEMOTHERAPY INFUSION START DATE: NORMAL
CHEMOTHERAPY RECORDS: 3 GY
CHEMOTHERAPY RECORDS: 6000 CGY
CHEMOTHERAPY RECORDS: NORMAL
CHEMOTHERAPY RX CANCER: NORMAL
DATE 1ST CHEMO CANCER: NORMAL
RAD ONC ARIA COURSE LAST TREATMENT DATE: NORMAL
RAD ONC ARIA COURSE TREATMENT ELAPSED DAYS: NORMAL
RAD ONC ARIA REFERENCE POINT DOSAGE GIVEN TO DATE: 42 GY
RAD ONC ARIA REFERENCE POINT ID: NORMAL
RAD ONC ARIA REFERENCE POINT SESSION DOSAGE GIVEN: 3 GY

## 2025-01-08 PROCEDURE — 77385 HCHG IMRT DELIVERY SIMPLE: CPT | Performed by: RADIOLOGY

## 2025-01-08 PROCEDURE — 77295 3-D RADIOTHERAPY PLAN: CPT | Mod: 26 | Performed by: RADIOLOGY

## 2025-01-08 PROCEDURE — 77334 RADIATION TREATMENT AID(S): CPT | Mod: XE | Performed by: RADIOLOGY

## 2025-01-08 PROCEDURE — 77300 RADIATION THERAPY DOSE PLAN: CPT | Performed by: RADIOLOGY

## 2025-01-08 PROCEDURE — 77334 RADIATION TREATMENT AID(S): CPT | Performed by: RADIOLOGY

## 2025-01-08 PROCEDURE — 77014 PR CT GUIDANCE PLACEMENT RAD THERAPY FIELDS: CPT | Mod: 26 | Performed by: RADIOLOGY

## 2025-01-08 PROCEDURE — 77300 RADIATION THERAPY DOSE PLAN: CPT | Mod: 26 | Performed by: RADIOLOGY

## 2025-01-08 PROCEDURE — 77295 3-D RADIOTHERAPY PLAN: CPT | Performed by: RADIOLOGY

## 2025-01-08 PROCEDURE — 77334 RADIATION TREATMENT AID(S): CPT | Mod: 26 | Performed by: RADIOLOGY

## 2025-01-08 ASSESSMENT — PAIN SCALES - GENERAL: PAINLEVEL_OUTOF10: NO PAIN

## 2025-01-08 ASSESSMENT — FIBROSIS 4 INDEX: FIB4 SCORE: 0.81

## 2025-01-08 NOTE — ON TREATMENT VISIT
"ON TREATMENT  NOTE  RADIATION ONCOLOGY DEPARTMENT    Patient name:  Manuel Quinn    Primary Physician:  Darling Franklin M.D. MRN: [id number]  CSN: [id number]   Referring physician:  Magdaleno Yang M.D.   : 1955, 69 y.o.     ENCOUNTER DATE:  2025      DIAGNOSIS:  Prostate cancer metastatic to bone (HCC)  Staging form: Prostate, AJCC 8th Edition  - Clinical stage from 2024: Stage IVB (cT3b, cN0, cM1b, PSA: 163, Grade Group: 3) - Signed by Magdaleno Yang M.D. on 2024  Histopathologic type: Adenocarcinoma, NOS  Stage prefix: Initial diagnosis  Prostate specific antigen (PSA) range: 20 or greater  Aquiles score: 7  Histologic grading system: 5 grade system      TREATMENT SUMMARY:  Course First Treatment Date 2024  Course Last Treatment Date 2025  Radiation Treatments       Active   Plans   Prostate_Node   Most recent treatment: Dose planned: 300 cGy (fraction 14 of 20 on 2025)   Total: Dose planned: 6,000 cGy   Elapsed Days:  @ 2025           Historical   No historical radiation treatments to show.                 SUBJECTIVE:  Moderate urination    VITAL SIGNS:      2025    11:34 AM 2025    11:33 AM 2024    11:59 AM 2024     2:30 PM 10/4/2024     2:27 PM 10/4/2024     1:44 PM 2024     9:22 AM   Vitals   SYSTOLIC 137 132 134 137 129 102 120   DIASTOLIC 70 71 72 88 72 68 74   Pulse 69 83 81 63 69 79 81   Temperature 35.9 °C (96.6 °F) 35.6 °C (96.1 °F) 36.1 °C (97 °F) 25.7 °C (78.3 °F) 35.9 °C (96.6 °F) 36.3 °C (97.4 °F) 36.3 °C (97.3 °F)   Respiration 18 18   18 14    Weight 208.78 212.3 213.19 214 215.61 204 199.3   Height     1.78 m (5' 10.08\")  1.829 m (6' 0.01\")   BMI 29.89 kg/m2 30.39 kg/m2 30.52 kg/m2 30.64 kg/m2 30.87 kg/m2 27.66 kg/m2 27.02 kg/m2   Pulse Oximetry 98 % 95 % 97 % 97 % 99 % 98 % 98 %     KPS: 100, Fully active, able to carry on all pre-disease performed without restriction (ECOG equivalent 0)  Encounter Vitals  Temperature: 35.9 °C " (96.6 °F)  Temp src: Temporal  Blood Pressure : 137/70  Pulse: 69  Respiration: 18  Pulse Oximetry: 98 %  Weight: 94.7 kg (208 lb 12.4 oz)  Weight Source: Stand Up Scale  Pain Score: No pain      1/8/2025    11:34 AM 1/2/2025    11:33 AM 12/27/2024    11:59 AM 12/18/2024     2:30 PM 10/4/2024     1:44 PM 8/1/2024    10:34 AM   Pain Assessment   Pain Score NO PAIN NO PAIN NO PAIN NO PAIN NO PAIN NO PAIN          PHYSICAL EXAM:  Physical Exam         1/8/2025    11:36 AM 1/2/2025    11:35 AM 12/27/2024    12:00 PM 12/18/2024     2:33 PM   Toxicity Assessment   Toxicity Assessment Male Pelvis Male Pelvis Male Pelvis Male Pelvis   Fatigue (lethargy, malaise, asthenia) Increased fatigue over baseline, but not altering normal activities Increased fatigue over baseline, but not altering normal activities None None   Radiation Dermatitis None None None None   Anorexia None None None None   Colitis None None None None   Constipation None None None None   Dehydration None None None None   Diarrhea w/o Colostomy None None None None   Flatulence None None None None   Nausea None None None None   Proctitis None None None None   Vomiting None None None None   RT - Pain due to RT None None None None   Tumor Pain (onset or exacerbation of tumor pain due to treatment) None None None None   Dysuria (painful urination) None None None None   Urinary Frequency Increase >2x normal but <hourly Increase >2x normal but <hourly Normal Normal   Urinary Urgency Present Present None None   Bladder Spasms Absent Absent Absent Absent   Incontinence None With coughing, sneezing, etc. None None   Urinary Retention Normal Normal Normal Normal       CURRENT MEDICATIONS:    Current Outpatient Medications:     methylPREDNISolone (MEDROL DOSEPAK) 4 MG Tablet Therapy Pack, As directed on the packaging label., Disp: 21 Tablet, Rfl: 0    predniSONE (DELTASONE) 5 MG Tab, Take 1 Tablet by mouth 2 times a day., Disp: 180 Tablet, Rfl: 3    Abiraterone Acetate  (ZYTIGA) 250 MG Tab, Take 4 tablets (1,000 mg) by mouth every day., Disp: 120 Tablet, Rfl: 11    tamsulosin (FLOMAX) 0.4 MG capsule, Take 2 Capsules by mouth 1/2 hour after breakfast for 360 days. Take before bed (not after breakfast), Disp: 180 Capsule, Rfl: 3    Ascorbic Acid (VITAMIN C) 1000 MG Tab, Take 500 mg by mouth every day., Disp: , Rfl:     NON SPECIFIED, Supplement called Quercitian, Disp: , Rfl:     Cholecalciferol (VITAMIN D) 2000 UNIT Tab, Take 2,000 Units by mouth every day., Disp: , Rfl:     Zinc 20 MG Cap, Take 20 mg by mouth every day., Disp: , Rfl:     LABORATORY DATA:   Lab Results   Component Value Date/Time    SODIUM 136 01/07/2025 11:52 AM    POTASSIUM 4.8 01/07/2025 11:52 AM    CHLORIDE 99 01/07/2025 11:52 AM    CO2 24 01/07/2025 11:52 AM    GLUCOSE 107 (H) 01/07/2025 11:52 AM    BUN 36 (H) 01/07/2025 11:52 AM    CREATININE 0.92 01/07/2025 11:52 AM       Lab Results   Component Value Date/Time    WBC 9.4 01/07/2025 11:52 AM    RBC 4.91 01/07/2025 11:52 AM    HEMOGLOBIN 16.0 01/07/2025 11:52 AM    HEMATOCRIT 46.5 01/07/2025 11:52 AM    MCV 94.7 01/07/2025 11:52 AM    MCH 32.6 01/07/2025 11:52 AM    MCHC 34.4 01/07/2025 11:52 AM    PLATELETCT 252 01/07/2025 11:52 AM         RADIOLOGY DATA:  No results found.    IMPRESSION:  Cancer Staging   Prostate cancer metastatic to bone (HCC)  Staging form: Prostate, AJCC 8th Edition  - Clinical stage from 7/2/2024: Stage IVB (cT3b, cN0, cM1b, PSA: 163, Grade Group: 3) - Signed by Magdaleno Yang M.D. on 7/2/2024      PLAN:  No change in treatment plan    Disposition:  Treatment plan and imaging reviewed. Questions answered. Continue therapy outlined.     Robert Pham M.D.    No orders of the defined types were placed in this encounter.

## 2025-01-09 ENCOUNTER — APPOINTMENT (OUTPATIENT)
Dept: URBAN - METROPOLITAN AREA CLINIC 6 | Facility: CLINIC | Age: 70
Setting detail: DERMATOLOGY
End: 2025-01-09

## 2025-01-09 ENCOUNTER — HOSPITAL ENCOUNTER (OUTPATIENT)
Dept: RADIATION ONCOLOGY | Facility: MEDICAL CENTER | Age: 70
End: 2025-01-09
Payer: MEDICARE

## 2025-01-09 DIAGNOSIS — Z71.89 OTHER SPECIFIED COUNSELING: ICD-10-CM

## 2025-01-09 DIAGNOSIS — L57.0 ACTINIC KERATOSIS: ICD-10-CM

## 2025-01-09 DIAGNOSIS — L81.4 OTHER MELANIN HYPERPIGMENTATION: ICD-10-CM

## 2025-01-09 DIAGNOSIS — D18.0 HEMANGIOMA: ICD-10-CM

## 2025-01-09 DIAGNOSIS — Z86.006 PERSONAL HISTORY OF MELANOMA IN-SITU: ICD-10-CM

## 2025-01-09 DIAGNOSIS — L82.1 OTHER SEBORRHEIC KERATOSIS: ICD-10-CM

## 2025-01-09 DIAGNOSIS — D22 MELANOCYTIC NEVI: ICD-10-CM

## 2025-01-09 PROBLEM — D22.62 MELANOCYTIC NEVI OF LEFT UPPER LIMB, INCLUDING SHOULDER: Status: ACTIVE | Noted: 2025-01-09

## 2025-01-09 PROBLEM — D22.4 MELANOCYTIC NEVI OF SCALP AND NECK: Status: ACTIVE | Noted: 2025-01-09

## 2025-01-09 PROBLEM — D22.61 MELANOCYTIC NEVI OF RIGHT UPPER LIMB, INCLUDING SHOULDER: Status: ACTIVE | Noted: 2025-01-09

## 2025-01-09 PROBLEM — D48.5 NEOPLASM OF UNCERTAIN BEHAVIOR OF SKIN: Status: ACTIVE | Noted: 2025-01-09

## 2025-01-09 PROBLEM — D18.01 HEMANGIOMA OF SKIN AND SUBCUTANEOUS TISSUE: Status: ACTIVE | Noted: 2025-01-09

## 2025-01-09 PROBLEM — D22.5 MELANOCYTIC NEVI OF TRUNK: Status: ACTIVE | Noted: 2025-01-09

## 2025-01-09 LAB
CHEMOTHERAPY INFUSION START DATE: NORMAL
CHEMOTHERAPY RECORDS: 3 GY
CHEMOTHERAPY RECORDS: 6000 CGY
CHEMOTHERAPY RECORDS: NORMAL
CHEMOTHERAPY RX CANCER: NORMAL
DATE 1ST CHEMO CANCER: NORMAL
RAD ONC ARIA COURSE LAST TREATMENT DATE: NORMAL
RAD ONC ARIA COURSE TREATMENT ELAPSED DAYS: NORMAL
RAD ONC ARIA REFERENCE POINT DOSAGE GIVEN TO DATE: 45 GY
RAD ONC ARIA REFERENCE POINT ID: NORMAL
RAD ONC ARIA REFERENCE POINT SESSION DOSAGE GIVEN: 3 GY

## 2025-01-09 PROCEDURE — ? SUNSCREEN RECOMMENDATIONS

## 2025-01-09 PROCEDURE — ? BIOPSY BY SHAVE METHOD

## 2025-01-09 PROCEDURE — 17000 DESTRUCT PREMALG LESION: CPT | Mod: 59

## 2025-01-09 PROCEDURE — 77014 PR CT GUIDANCE PLACEMENT RAD THERAPY FIELDS: CPT | Mod: 26 | Performed by: RADIOLOGY

## 2025-01-09 PROCEDURE — 11102 TANGNTL BX SKIN SINGLE LES: CPT

## 2025-01-09 PROCEDURE — 17003 DESTRUCT PREMALG LES 2-14: CPT

## 2025-01-09 PROCEDURE — ? COUNSELING

## 2025-01-09 PROCEDURE — 77427 RADIATION TX MANAGEMENT X5: CPT | Performed by: RADIOLOGY

## 2025-01-09 PROCEDURE — ? LIQUID NITROGEN

## 2025-01-09 PROCEDURE — 77385 HCHG IMRT DELIVERY SIMPLE: CPT | Performed by: RADIOLOGY

## 2025-01-09 PROCEDURE — 99213 OFFICE O/P EST LOW 20 MIN: CPT | Mod: 25

## 2025-01-09 ASSESSMENT — LOCATION DETAILED DESCRIPTION DERM
LOCATION DETAILED: RIGHT ANTERIOR PROXIMAL UPPER ARM
LOCATION DETAILED: LEFT SUPERIOR POSTERIOR NECK
LOCATION DETAILED: EPIGASTRIC SKIN
LOCATION DETAILED: LEFT PROXIMAL POSTERIOR UPPER ARM
LOCATION DETAILED: RIGHT SUPERIOR CRUS OF ANTIHELIX
LOCATION DETAILED: RIGHT DISTAL POSTERIOR UPPER ARM
LOCATION DETAILED: LEFT ANTERIOR PROXIMAL UPPER ARM
LOCATION DETAILED: LEFT POSTERIOR NECK
LOCATION DETAILED: RIGHT PROXIMAL POSTERIOR UPPER ARM
LOCATION DETAILED: LEFT CENTRAL TEMPLE
LOCATION DETAILED: LEFT INFERIOR MEDIAL MALAR CHEEK
LOCATION DETAILED: LEFT SUPERIOR LATERAL MIDBACK
LOCATION DETAILED: LEFT SUPERIOR FOREHEAD
LOCATION DETAILED: RIGHT ANTERIOR DISTAL UPPER ARM
LOCATION DETAILED: MID POSTERIOR NECK
LOCATION DETAILED: LEFT POSTERIOR SHOULDER
LOCATION DETAILED: LEFT MEDIAL INFERIOR CHEST
LOCATION DETAILED: LEFT CENTRAL MALAR CHEEK
LOCATION DETAILED: LEFT INFERIOR UPPER BACK
LOCATION DETAILED: LEFT DISTAL POSTERIOR UPPER ARM
LOCATION DETAILED: LEFT ANTERIOR DISTAL UPPER ARM

## 2025-01-09 ASSESSMENT — LOCATION SIMPLE DESCRIPTION DERM
LOCATION SIMPLE: RIGHT EAR
LOCATION SIMPLE: POSTERIOR NECK
LOCATION SIMPLE: ABDOMEN
LOCATION SIMPLE: LEFT CHEEK
LOCATION SIMPLE: LEFT SHOULDER
LOCATION SIMPLE: LEFT FOREHEAD
LOCATION SIMPLE: LEFT UPPER BACK
LOCATION SIMPLE: LEFT UPPER ARM
LOCATION SIMPLE: LEFT TEMPLE
LOCATION SIMPLE: RIGHT UPPER ARM
LOCATION SIMPLE: CHEST
LOCATION SIMPLE: LEFT LOWER BACK

## 2025-01-09 ASSESSMENT — LOCATION ZONE DERM
LOCATION ZONE: FACE
LOCATION ZONE: NECK
LOCATION ZONE: EAR
LOCATION ZONE: ARM
LOCATION ZONE: TRUNK

## 2025-01-09 NOTE — PROCEDURE: LIQUID NITROGEN
Detail Level: Zone
Show Aperture Variable?: Yes
Duration Of Freeze Thaw-Cycle (Seconds): 8
Render Note In Bullet Format When Appropriate: No
Consent: The patient's verbal consent was obtained including but not limited to risks of crusting, scabbing, blistering, scarring, darker or lighter pigmentary change, recurrence, incomplete removal and infection.
Post-Care Instructions: I reviewed with the patient in detail post-care instructions. Patient is to wear sunprotection, and avoid picking at any of the treated lesions. Pt may apply Vaseline to crusted or scabbing areas.
Number Of Freeze-Thaw Cycles: 2 freeze-thaw cycles

## 2025-01-10 ENCOUNTER — OUTPATIENT INFUSION SERVICES (OUTPATIENT)
Dept: ONCOLOGY | Facility: MEDICAL CENTER | Age: 70
End: 2025-01-10
Attending: INTERNAL MEDICINE
Payer: MEDICARE

## 2025-01-10 ENCOUNTER — HOSPITAL ENCOUNTER (OUTPATIENT)
Dept: HEMATOLOGY ONCOLOGY | Facility: MEDICAL CENTER | Age: 70
End: 2025-01-10
Attending: INTERNAL MEDICINE
Payer: MEDICARE

## 2025-01-10 ENCOUNTER — HOSPITAL ENCOUNTER (OUTPATIENT)
Dept: RADIATION ONCOLOGY | Facility: MEDICAL CENTER | Age: 70
End: 2025-01-10

## 2025-01-10 VITALS
WEIGHT: 210.32 LBS | RESPIRATION RATE: 18 BRPM | HEIGHT: 70 IN | HEART RATE: 80 BPM | SYSTOLIC BLOOD PRESSURE: 123 MMHG | DIASTOLIC BLOOD PRESSURE: 75 MMHG | TEMPERATURE: 96.5 F | BODY MASS INDEX: 30.11 KG/M2 | OXYGEN SATURATION: 97 %

## 2025-01-10 VITALS
WEIGHT: 209.99 LBS | TEMPERATURE: 96.8 F | DIASTOLIC BLOOD PRESSURE: 72 MMHG | HEIGHT: 70 IN | SYSTOLIC BLOOD PRESSURE: 104 MMHG | OXYGEN SATURATION: 97 % | RESPIRATION RATE: 12 BRPM | BODY MASS INDEX: 30.06 KG/M2 | HEART RATE: 70 BPM

## 2025-01-10 DIAGNOSIS — C79.51 PROSTATE CANCER METASTATIC TO BONE (HCC): ICD-10-CM

## 2025-01-10 DIAGNOSIS — C61 PROSTATE CANCER METASTATIC TO BONE (HCC): ICD-10-CM

## 2025-01-10 LAB
CHEMOTHERAPY INFUSION START DATE: NORMAL
CHEMOTHERAPY RECORDS: 3 GY
CHEMOTHERAPY RECORDS: 6000 CGY
CHEMOTHERAPY RECORDS: NORMAL
CHEMOTHERAPY RX CANCER: NORMAL
DATE 1ST CHEMO CANCER: NORMAL
RAD ONC ARIA COURSE LAST TREATMENT DATE: NORMAL
RAD ONC ARIA COURSE TREATMENT ELAPSED DAYS: NORMAL
RAD ONC ARIA REFERENCE POINT DOSAGE GIVEN TO DATE: 48 GY
RAD ONC ARIA REFERENCE POINT ID: NORMAL
RAD ONC ARIA REFERENCE POINT SESSION DOSAGE GIVEN: 3 GY

## 2025-01-10 PROCEDURE — 99214 OFFICE O/P EST MOD 30 MIN: CPT | Performed by: INTERNAL MEDICINE

## 2025-01-10 PROCEDURE — 99212 OFFICE O/P EST SF 10 MIN: CPT | Performed by: INTERNAL MEDICINE

## 2025-01-10 PROCEDURE — 96402 CHEMO HORMON ANTINEOPL SQ/IM: CPT

## 2025-01-10 PROCEDURE — 700111 HCHG RX REV CODE 636 W/ 250 OVERRIDE (IP): Mod: JZ,TB | Performed by: NURSE PRACTITIONER

## 2025-01-10 PROCEDURE — 77014 PR CT GUIDANCE PLACEMENT RAD THERAPY FIELDS: CPT | Mod: 26 | Performed by: RADIOLOGY

## 2025-01-10 PROCEDURE — 77385 HCHG IMRT DELIVERY SIMPLE: CPT | Performed by: RADIOLOGY

## 2025-01-10 RX ORDER — 0.9 % SODIUM CHLORIDE 0.9 %
VIAL (ML) INJECTION PRN
Status: CANCELLED | OUTPATIENT
Start: 2025-01-10

## 2025-01-10 RX ORDER — 0.9 % SODIUM CHLORIDE 0.9 %
10 VIAL (ML) INJECTION PRN
Status: CANCELLED | OUTPATIENT
Start: 2025-01-10

## 2025-01-10 RX ORDER — 0.9 % SODIUM CHLORIDE 0.9 %
3 VIAL (ML) INJECTION PRN
Status: CANCELLED | OUTPATIENT
Start: 2025-01-10

## 2025-01-10 RX ADMIN — LEUPROLIDE ACETATE 22.5 MG: KIT at 11:58

## 2025-01-10 ASSESSMENT — PAIN SCALES - GENERAL: PAINLEVEL_OUTOF10: NO PAIN

## 2025-01-10 ASSESSMENT — FIBROSIS 4 INDEX
FIB4 SCORE: 0.81
FIB4 SCORE: 0.81

## 2025-01-10 NOTE — PROGRESS NOTES
Benedict here for leuprolide IM injection q3 month. Denies acute complaints. Reports recent diarrhea that resolved with course of methylpredisolone - finished course a couple days ago.     VSS. Labs reviewed from 1/7/25 - PSA resulted. No further labs drawn. Leuprolide administered as ordered - IM in L dorsogluteal. Site unremarkable, tolerated well.     Benedict saw Dr. Yang oncologist today and will f/u regarding future plan of care. Will call to schedule next Lupron treatment if treatment is to continue as scheduled per provider and pt planning.     Pt d/c to home in stable condition, NAD.

## 2025-01-10 NOTE — PROGRESS NOTES
PROGRESS NOTE: HEMATOLOGY/ONCOLOGY     Referring Physician:   Primary Care:  Darling Franklin M.D.    Diagnosis: Metastatic prostate cancer to bone    History of Presenting Illness:  Manuel Quinn is a 69 y.o. male who has a no significant past medical history except kidney stone several years ago.  He has a history of BPH and he presented with a urinary retention on the March 28, 2024.  His initial PSA was 163 and a follow-up PSA was 148 on April 11, 2024.  Patient had the MRI of the prostate on the May 11, 2024 which showed evidence of prostate cancer extending to the seminal vesicle and there was a signs of bilateral ischium bone metastasis.  Eventually, he had a prostate biopsy on June 4, 2024 which showed group 3, Aquiles 7 (3+4) prostate cancer.  He had a bone scan on May 29, 2024 which showed evidence of bilateral inferior pubic ramus and left posterior rib metastasis.  Patient had a PSMA PET scan on July 20, 2024 which showed evidence of bilateral pubic ramus, right posterior ilium, and a left posterior sacral metastasis.  His creatinine is 1.5 and his GFR is 50. His PSA came down from 148-67.9 on July 29, 2024.  Patient had a Lupron 22.5 mg on July 5, 2024.  Patient will have a second Lupron injection on October 4, 2024.  Patient had a PSA of 4.76 on October 2, 2024. He comes for routine follow-up today.      Interval History:    7/5/2024 : C 1 Lupron 22.5mg  8/1/2024 : Zytiga/prednisone.  10/4/2024 : C 2 Lupron 22.5 mg  1/10/25 : C 3 Lupron    Past Medical History:   Diagnosis Date    Inguinal hernia     L    Kidney stone        History reviewed. No pertinent surgical history.    Family History   Family history unknown: Yes       OB History   No obstetric history on file.       Allergies as of 01/10/2025 - Reviewed 01/10/2025   Allergen Reaction Noted    Eggs or egg-derived products [chicken-derived products] Diarrhea and Vomiting 03/12/2024         Current Outpatient Medications:      "methylPREDNISolone (MEDROL DOSEPAK) 4 MG Tablet Therapy Pack, As directed on the packaging label., Disp: 21 Tablet, Rfl: 0    predniSONE (DELTASONE) 5 MG Tab, Take 1 Tablet by mouth 2 times a day., Disp: 180 Tablet, Rfl: 3    Abiraterone Acetate (ZYTIGA) 250 MG Tab, Take 4 tablets (1,000 mg) by mouth every day., Disp: 120 Tablet, Rfl: 11    tamsulosin (FLOMAX) 0.4 MG capsule, Take 2 Capsules by mouth 1/2 hour after breakfast for 360 days. Take before bed (not after breakfast), Disp: 180 Capsule, Rfl: 3    Ascorbic Acid (VITAMIN C) 1000 MG Tab, Take 500 mg by mouth every day., Disp: , Rfl:     NON SPECIFIED, Supplement called Quercitian, Disp: , Rfl:     Cholecalciferol (VITAMIN D) 2000 UNIT Tab, Take 2,000 Units by mouth every day., Disp: , Rfl:     Zinc 20 MG Cap, Take 20 mg by mouth every day., Disp: , Rfl:     Review of Systems:  Patient denies fever, chills, nausea, vomiting, chest pain, sob, blood in stools, black stool, blood in urine. All systems are reviewed See HPI.  He continues to have frequency and he wakes up every 1 and half to 2 hours and nighttime    Physical Exam:  /72 (BP Location: Left arm, Patient Position: Sitting, BP Cuff Size: Adult)   Pulse 70   Temp 36 °C (96.8 °F) (Temporal)   Resp 12   Ht 1.778 m (5' 10\")   Wt 95.2 kg (209 lb 15.8 oz)   SpO2 97%  Body mass index is 30.13 kg/m².  Constitutional:  NAD, well appearing.  HEENT:   SIERRA EOMI  Cardiovascular: RRR.   No m/r/g. No carotid bruits.       Lungs:   Clear, no wheezing, no rales, no respiratory distress.  Abdomen: Soft, non-tender. + BS, no masses, no suprapubic tenderness, no hepatomegaly.  Extremities:  No pedal edema. Bilateral and radial pulses present.  Skin:  Warm and dry.    Neurologic: Alert & oriented x 3, CN II-XII grossly intact, strength and sensation grossly intact.  No focal deficits noted.  Psychiatric:  Affect normal, mood normal, judgment normal.      Assessment & Plan:    #1. Metastatic prostate cancer to " the bone, T3bM1.  Patient had Lupron 22.5 mg on July 5, 2024 and he will have c 3 Lupron today. He will have every 12 weeks indefinitely.  He is tolerating Lupron very well and that he does not have much of side effects. I explained to him in detail regarding prognosis and management of metastatic prostate cancer to the bone one more time.  His Aquiles score is 7 and the average patient will live for 5 to 6 years from date of diagnosis.  Patient was seen by radiation oncology and he is on radiation treatment to the prostate. He will have radiosurgery to metastatic area in the future. He is taking Zytiga 1000 mg p.o. daily and prednisone 5 mg twice a day very well and there are no significant side effects which will be continued.    I discussed with the patient regarding benefit and the side effects of orchiectomy which will replace indefinite Lupron.    I will follow this patient in 12 weeks for continuous care.  His PSA came down to 0.67 on 1/7/25 and he is responding to current treatment very well.       #2. Frequency.  I recommended for the patient to discuss with the urologist regarding his frequency issues.    Thank you for allowing me to participate in the care for this patient. Please feel free to contact me for any questions or concerns.   Total time spent on chart review, clinic encounter, and documentation: 25 minutes.

## 2025-01-13 ENCOUNTER — HOSPITAL ENCOUNTER (OUTPATIENT)
Dept: RADIATION ONCOLOGY | Facility: MEDICAL CENTER | Age: 70
End: 2025-01-13
Payer: MEDICARE

## 2025-01-13 LAB
CHEMOTHERAPY INFUSION START DATE: NORMAL
CHEMOTHERAPY RECORDS: 3 GY
CHEMOTHERAPY RECORDS: 6000 CGY
CHEMOTHERAPY RECORDS: NORMAL
CHEMOTHERAPY RX CANCER: NORMAL
DATE 1ST CHEMO CANCER: NORMAL
RAD ONC ARIA COURSE LAST TREATMENT DATE: NORMAL
RAD ONC ARIA COURSE TREATMENT ELAPSED DAYS: NORMAL
RAD ONC ARIA REFERENCE POINT DOSAGE GIVEN TO DATE: 51 GY
RAD ONC ARIA REFERENCE POINT ID: NORMAL
RAD ONC ARIA REFERENCE POINT SESSION DOSAGE GIVEN: 3 GY

## 2025-01-13 PROCEDURE — 77014 PR CT GUIDANCE PLACEMENT RAD THERAPY FIELDS: CPT | Mod: 26 | Performed by: RADIOLOGY

## 2025-01-13 PROCEDURE — 77385 HCHG IMRT DELIVERY SIMPLE: CPT | Performed by: RADIOLOGY

## 2025-01-14 ENCOUNTER — HOSPITAL ENCOUNTER (OUTPATIENT)
Dept: RADIATION ONCOLOGY | Facility: MEDICAL CENTER | Age: 70
End: 2025-01-14
Payer: MEDICARE

## 2025-01-14 LAB
CHEMOTHERAPY INFUSION START DATE: NORMAL
CHEMOTHERAPY RECORDS: 3 GY
CHEMOTHERAPY RECORDS: 6000 CGY
CHEMOTHERAPY RECORDS: NORMAL
CHEMOTHERAPY RX CANCER: NORMAL
DATE 1ST CHEMO CANCER: NORMAL
RAD ONC ARIA COURSE LAST TREATMENT DATE: NORMAL
RAD ONC ARIA COURSE TREATMENT ELAPSED DAYS: NORMAL
RAD ONC ARIA REFERENCE POINT DOSAGE GIVEN TO DATE: 54 GY
RAD ONC ARIA REFERENCE POINT ID: NORMAL
RAD ONC ARIA REFERENCE POINT SESSION DOSAGE GIVEN: 3 GY

## 2025-01-14 PROCEDURE — 77385 HCHG IMRT DELIVERY SIMPLE: CPT | Performed by: RADIOLOGY

## 2025-01-14 PROCEDURE — 77336 RADIATION PHYSICS CONSULT: CPT | Performed by: RADIOLOGY

## 2025-01-14 PROCEDURE — 77014 PR CT GUIDANCE PLACEMENT RAD THERAPY FIELDS: CPT | Mod: 26 | Performed by: RADIOLOGY

## 2025-01-15 ENCOUNTER — HOSPITAL ENCOUNTER (OUTPATIENT)
Dept: RADIATION ONCOLOGY | Facility: MEDICAL CENTER | Age: 70
End: 2025-01-15
Payer: MEDICARE

## 2025-01-15 ENCOUNTER — HOSPITAL ENCOUNTER (OUTPATIENT)
Dept: RADIATION ONCOLOGY | Facility: MEDICAL CENTER | Age: 70
End: 2025-01-15
Attending: RADIOLOGY
Payer: MEDICARE

## 2025-01-15 VITALS
BODY MASS INDEX: 30.46 KG/M2 | SYSTOLIC BLOOD PRESSURE: 128 MMHG | HEART RATE: 89 BPM | RESPIRATION RATE: 18 BRPM | OXYGEN SATURATION: 94 % | DIASTOLIC BLOOD PRESSURE: 73 MMHG | WEIGHT: 212.74 LBS | TEMPERATURE: 97.1 F

## 2025-01-15 LAB
CHEMOTHERAPY INFUSION START DATE: NORMAL
CHEMOTHERAPY RECORDS: 3 GY
CHEMOTHERAPY RECORDS: 6000 CGY
CHEMOTHERAPY RECORDS: NORMAL
CHEMOTHERAPY RX CANCER: NORMAL
DATE 1ST CHEMO CANCER: NORMAL
RAD ONC ARIA COURSE LAST TREATMENT DATE: NORMAL
RAD ONC ARIA COURSE TREATMENT ELAPSED DAYS: NORMAL
RAD ONC ARIA REFERENCE POINT DOSAGE GIVEN TO DATE: 57 GY
RAD ONC ARIA REFERENCE POINT ID: NORMAL
RAD ONC ARIA REFERENCE POINT SESSION DOSAGE GIVEN: 3 GY

## 2025-01-15 PROCEDURE — 77385 HCHG IMRT DELIVERY SIMPLE: CPT | Performed by: RADIOLOGY

## 2025-01-15 PROCEDURE — 77014 PR CT GUIDANCE PLACEMENT RAD THERAPY FIELDS: CPT | Mod: 26 | Performed by: RADIOLOGY

## 2025-01-15 ASSESSMENT — PAIN SCALES - GENERAL: PAINLEVEL_OUTOF10: NO PAIN

## 2025-01-15 ASSESSMENT — FIBROSIS 4 INDEX: FIB4 SCORE: 0.81

## 2025-01-15 NOTE — ON TREATMENT VISIT
ON TREATMENT  NOTE  RADIATION ONCOLOGY DEPARTMENT    Patient name:  Manuel Quinn    Primary Physician:  Darling Franklin M.D. MRN: [id number]  CSN: [id number]   Referring physician:  Magdaleno Yang M.D.   : 1955, 69 y.o.     ENCOUNTER DATE:  1/15/2025      DIAGNOSIS:  Prostate cancer metastatic to bone (HCC)  Staging form: Prostate, AJCC 8th Edition  - Clinical stage from 2024: Stage IVB (cT3b, cN0, cM1b, PSA: 163, Grade Group: 3) - Signed by Magdaleno Yang M.D. on 2024  Histopathologic type: Adenocarcinoma, NOS  Stage prefix: Initial diagnosis  Prostate specific antigen (PSA) range: 20 or greater  Madison score: 7  Histologic grading system: 5 grade system      TREATMENT SUMMARY:  Course First Treatment Date 2024  Course Last Treatment Date 01/15/2025  Radiation Therapy Episodes       Active Episodes       Radiation Therapy: IMRT                   Radiation Treatments         Plan Last Treated On Elapsed Days Fractions Treated Prescribed Fraction Dose (cGy) Prescribed Total Dose (cGy)    Prostate_Node 1/15/2025 28 @ 01/15/2025 19 of 20 300 6,000                  Reference Point Last Treated On Elapsed Days Most Recent Session Dose (cGy) Total Dose (cGy)    Prostate_Node 1/15/2025 28 @ 01/15/2025 300 5,700                               SUBJECTIVE:  Benedict is doing okay.  He did have good resolution of his symptoms with the Medrol Dosepak, but his frequency has recurred now that he has completed his steroids.  He was told to ask about a TURP procedure by medical oncology.  He will discuss this with urology.  Otherwise, no major complaints currently.    VITAL SIGNS:      1/15/2025    11:41 AM 1/10/2025    11:45 AM 1/10/2025    10:29 AM 2025    11:34 AM 2025    11:33 AM 2024    11:59 AM 2024     2:30 PM   Vitals   SYSTOLIC 128 123 104 137 132 134 137   DIASTOLIC 73 75 72 70 71 72 88   Pulse 89 80 70 69 83 81 63   Temperature 36.2 °C (97.1 °F) 35.8 °C (96.5 °F) 36 °C (96.8 °F)  "35.9 °C (96.6 °F) 35.6 °C (96.1 °F) 36.1 °C (97 °F) 25.7 °C (78.3 °F)   Respiration 18 18 12 18 18     Weight 212.74 210.32 209.99 208.78 212.3 213.19 214   Height  1.78 m (5' 10.08\") 1.778 m (5' 10\")       BMI 30.46 kg/m2 30.11 kg/m2 30.13 kg/m2 29.89 kg/m2 30.39 kg/m2 30.52 kg/m2 30.64 kg/m2   Pulse Oximetry 94 % 97 % 97 % 98 % 95 % 97 % 97 %     KPS: 90, Able to carry on normal activity; minor signs or symptoms of disease (ECOG equivalent 0)  Encounter Vitals  Temperature: 36.2 °C (97.1 °F)  Temp src: Temporal  Blood Pressure : 128/73  Pulse: 89  Respiration: 18  Pulse Oximetry: 94 %  Weight: 96.5 kg (212 lb 11.9 oz)  Weight Source: Stand Up Scale  Pain Score: No pain      1/15/2025    11:41 AM 1/10/2025    10:29 AM 1/8/2025    11:34 AM 1/2/2025    11:33 AM 12/27/2024    11:59 AM 12/18/2024     2:30 PM   Pain Assessment   Pain Score NO PAIN NO PAIN NO PAIN NO PAIN NO PAIN NO PAIN          PHYSICAL EXAM:  Physical Exam  Constitutional:       Appearance: Normal appearance.   HENT:      Head: Normocephalic and atraumatic.   Eyes:      Extraocular Movements: Extraocular movements intact.      Conjunctiva/sclera: Conjunctivae normal.   Cardiovascular:      Rate and Rhythm: Normal rate and regular rhythm.   Pulmonary:      Effort: Pulmonary effort is normal. No respiratory distress.   Abdominal:      General: Abdomen is flat.      Palpations: Abdomen is soft.   Musculoskeletal:         General: Normal range of motion.   Neurological:      General: No focal deficit present.      Mental Status: He is alert and oriented to person, place, and time.              1/15/2025    11:42 AM 1/8/2025    11:36 AM 1/2/2025    11:35 AM 12/27/2024    12:00 PM 12/18/2024     2:33 PM   Toxicity Assessment   Toxicity Assessment Male Pelvis Male Pelvis Male Pelvis Male Pelvis Male Pelvis   Fatigue (lethargy, malaise, asthenia) None Increased fatigue over baseline, but not altering normal activities Increased fatigue over baseline, but not " altering normal activities None None   Radiation Dermatitis None None None None None   Anorexia None None None None None   Colitis None None None None None   Constipation None None None None None   Dehydration None None None None None   Diarrhea w/o Colostomy None None None None None   Flatulence None None None None None   Nausea None None None None None   Proctitis None None None None None   Vomiting None None None None None   RT - Pain due to RT None None None None None   Tumor Pain (onset or exacerbation of tumor pain due to treatment) None None None None None   Dysuria (painful urination) None None None None None   Urinary Frequency Increase >2x normal but <hourly Increase >2x normal but <hourly Increase >2x normal but <hourly Normal Normal   Urinary Urgency Present Present Present None None   Bladder Spasms Absent Absent Absent Absent Absent   Incontinence None None With coughing, sneezing, etc. None None   Urinary Retention Normal Normal Normal Normal Normal       CURRENT MEDICATIONS:    Current Outpatient Medications:     methylPREDNISolone (MEDROL DOSEPAK) 4 MG Tablet Therapy Pack, As directed on the packaging label., Disp: 21 Tablet, Rfl: 0    predniSONE (DELTASONE) 5 MG Tab, Take 1 Tablet by mouth 2 times a day., Disp: 180 Tablet, Rfl: 3    Abiraterone Acetate (ZYTIGA) 250 MG Tab, Take 4 tablets (1,000 mg) by mouth every day., Disp: 120 Tablet, Rfl: 11    tamsulosin (FLOMAX) 0.4 MG capsule, Take 2 Capsules by mouth 1/2 hour after breakfast for 360 days. Take before bed (not after breakfast), Disp: 180 Capsule, Rfl: 3    Ascorbic Acid (VITAMIN C) 1000 MG Tab, Take 500 mg by mouth every day., Disp: , Rfl:     NON SPECIFIED, Supplement called Quercitian, Disp: , Rfl:     Cholecalciferol (VITAMIN D) 2000 UNIT Tab, Take 2,000 Units by mouth every day., Disp: , Rfl:     Zinc 20 MG Cap, Take 20 mg by mouth every day., Disp: , Rfl:     LABORATORY DATA:   Lab Results   Component Value Date/Time    SODIUM 136  01/07/2025 11:52 AM    POTASSIUM 4.8 01/07/2025 11:52 AM    CHLORIDE 99 01/07/2025 11:52 AM    CO2 24 01/07/2025 11:52 AM    GLUCOSE 107 (H) 01/07/2025 11:52 AM    BUN 36 (H) 01/07/2025 11:52 AM    CREATININE 0.92 01/07/2025 11:52 AM       Lab Results   Component Value Date/Time    WBC 9.4 01/07/2025 11:52 AM    RBC 4.91 01/07/2025 11:52 AM    HEMOGLOBIN 16.0 01/07/2025 11:52 AM    HEMATOCRIT 46.5 01/07/2025 11:52 AM    MCV 94.7 01/07/2025 11:52 AM    MCH 32.6 01/07/2025 11:52 AM    MCHC 34.4 01/07/2025 11:52 AM    PLATELETCT 252 01/07/2025 11:52 AM         RADIOLOGY DATA:  No results found.    IMPRESSION:  Cancer Staging   Prostate cancer metastatic to bone (HCC)  Staging form: Prostate, AJCC 8th Edition  - Clinical stage from 7/2/2024: Stage IVB (cT3b, cN0, cM1b, PSA: 163, Grade Group: 3) - Signed by Magdaleno Yang M.D. on 7/2/2024      PLAN:  No change in treatment plan.  Advised to hold off on TURP for at least 3 months after radiation completion to allow for new baseline before seeing if he really needs it or not.    Disposition:  Treatment plan and imaging reviewed. Questions answered. Continue therapy outlined.     Consuelo Jurado M.D.    No orders of the defined types were placed in this encounter.

## 2025-01-16 ENCOUNTER — HOSPITAL ENCOUNTER (OUTPATIENT)
Dept: RADIATION ONCOLOGY | Facility: MEDICAL CENTER | Age: 70
End: 2025-01-16
Payer: MEDICARE

## 2025-01-16 LAB
CHEMOTHERAPY INFUSION START DATE: NORMAL
CHEMOTHERAPY RECORDS: 3 GY
CHEMOTHERAPY RECORDS: 6000 CGY
CHEMOTHERAPY RECORDS: NORMAL
CHEMOTHERAPY RX CANCER: NORMAL
DATE 1ST CHEMO CANCER: NORMAL
RAD ONC ARIA COURSE LAST TREATMENT DATE: NORMAL
RAD ONC ARIA COURSE TREATMENT ELAPSED DAYS: NORMAL
RAD ONC ARIA REFERENCE POINT DOSAGE GIVEN TO DATE: 60 GY
RAD ONC ARIA REFERENCE POINT ID: NORMAL
RAD ONC ARIA REFERENCE POINT SESSION DOSAGE GIVEN: 3 GY

## 2025-01-16 PROCEDURE — 77385 HCHG IMRT DELIVERY SIMPLE: CPT | Performed by: RADIOLOGY

## 2025-01-16 PROCEDURE — 77014 PR CT GUIDANCE PLACEMENT RAD THERAPY FIELDS: CPT | Mod: 26 | Performed by: RADIOLOGY

## 2025-01-16 PROCEDURE — 77427 RADIATION TX MANAGEMENT X5: CPT | Performed by: RADIOLOGY

## 2025-01-17 NOTE — RADIATION COMPLETION NOTES
END OF TREATMENT SUMMARY    Patient name:  Manuel Quinn    Primary Physician:  Darling Franklin M.D. MRN: [id number]  CSN: [id number]   Referring physician:  No ref. provider found  : 1955, 69 y.o.       TREATMENT SUMMARY:        Course First Treatment Date 2024    Course Last Treatment Date 2025   Course Elapsed Days  @ 2025   Course Intent Curative     Radiation Therapy Episodes       Active Episodes       Radiation Therapy: IMRT                   Radiation Treatments         Plan Last Treated On Elapsed Days Fractions Treated Prescribed Fraction Dose (cGy) Prescribed Total Dose (cGy)    Prostate_Node 2025 29 @ 2025 20 of 20 300 6,000                  Reference Point Last Treated On Elapsed Days Most Recent Session Dose (cGy) Total Dose (cGy)    Prostate_Node 2025 @ 2025 300 6,000                                     STAGE:   Prostate cancer metastatic to bone (HCC)  Staging form: Prostate, AJCC 8th Edition  - Clinical stage from 2024: Stage IVB (cT3b, cN0, cM1b, PSA: 163, Grade Group: 3) - Signed by Magdaleno Yang M.D. on 2024  Histopathologic type: Adenocarcinoma, NOS  Stage prefix: Initial diagnosis  Prostate specific antigen (PSA) range: 20 or greater  Aquiles score: 7  Histologic grading system: 5 grade system       TREATMENT INDICATION:   ***     CONCURRENT SYSTEMIC TREATMENT:   ***     RT COURSE DISCONTINUED EARLY:   No     PATIENT EXPERIENCE:       1/15/2025    11:42 AM 2025    11:36 AM 2025    11:35 AM 2024    12:00 PM 2024     2:33 PM   Toxicity Assessment   Toxicity Assessment Male Pelvis Male Pelvis Male Pelvis Male Pelvis Male Pelvis   Fatigue (lethargy, malaise, asthenia) None Increased fatigue over baseline, but not altering normal activities Increased fatigue over baseline, but not altering normal activities None None   Radiation Dermatitis None None None None None   Anorexia None None None None None   Colitis None  "None None None None   Constipation None None None None None   Dehydration None None None None None   Diarrhea w/o Colostomy None None None None None   Flatulence None None None None None   Nausea None None None None None   Proctitis None None None None None   Vomiting None None None None None   RT - Pain due to RT None None None None None   Tumor Pain (onset or exacerbation of tumor pain due to treatment) None None None None None   Dysuria (painful urination) None None None None None   Urinary Frequency Increase >2x normal but <hourly Increase >2x normal but <hourly Increase >2x normal but <hourly Normal Normal   Urinary Urgency Present Present Present None None   Bladder Spasms Absent Absent Absent Absent Absent   Incontinence None None With coughing, sneezing, etc. None None   Urinary Retention Normal Normal Normal Normal Normal        FOLLOW-UP PLAN:   {avpfollowuplist:47398::\"8 Weeks\"}     COMMENT:          ANATOMIC TARGET SUMMARY    ANATOMIC TARGET MODALITY TECHNIQUE   ***   {RAD ONC MODALITY:14339} {RAD ONC Technique:24713::\"IMRT\"}            COMMENT:         DIAGRAMS:      DOSE VOLUME HISTOGRAMS:            "

## 2025-01-31 ENCOUNTER — HOSPITAL ENCOUNTER (OUTPATIENT)
Dept: RADIATION ONCOLOGY | Facility: MEDICAL CENTER | Age: 70
End: 2025-01-31

## 2025-01-31 LAB
CHEMOTHERAPY INFUSION START DATE: NORMAL
CHEMOTHERAPY RECORDS: 2700 CGY
CHEMOTHERAPY RECORDS: 9 GY
CHEMOTHERAPY RECORDS: NORMAL
CHEMOTHERAPY RX CANCER: NORMAL
DATE 1ST CHEMO CANCER: NORMAL
RAD ONC ARIA COURSE LAST TREATMENT DATE: NORMAL
RAD ONC ARIA COURSE TREATMENT ELAPSED DAYS: NORMAL
RAD ONC ARIA REFERENCE POINT DOSAGE GIVEN TO DATE: 9 GY
RAD ONC ARIA REFERENCE POINT ID: NORMAL
RAD ONC ARIA REFERENCE POINT SESSION DOSAGE GIVEN: 9 GY

## 2025-01-31 PROCEDURE — 77373 STRTCTC BDY RAD THER TX DLVR: CPT | Performed by: RADIOLOGY

## 2025-01-31 PROCEDURE — 77280 THER RAD SIMULAJ FIELD SMPL: CPT | Performed by: RADIOLOGY

## 2025-01-31 PROCEDURE — 77280 THER RAD SIMULAJ FIELD SMPL: CPT | Mod: 26 | Performed by: RADIOLOGY

## 2025-01-31 PROCEDURE — 77435 SBRT MANAGEMENT: CPT | Performed by: RADIOLOGY

## 2025-01-31 NOTE — PROCEDURES
DATE OF SERVICE: 1/31/2025    DIAGNOSIS:  Prostate cancer metastatic to bone (HCC)  Staging form: Prostate, AJCC 8th Edition  - Clinical stage from 7/2/2024: Stage IVB (cT3b, cN0, cM1b, PSA: 163, Grade Group: 3) - Signed by Magdaleno Yang M.D. on 7/2/2024  Histopathologic type: Adenocarcinoma, NOS  Stage prefix: Initial diagnosis  Prostate specific antigen (PSA) range: 20 or greater  H[place] score: 7  Histologic grading system: 5 grade system       TREATMENT:  Radiation Therapy Episodes       Active Episodes       Radiation Therapy: IMRT                   Radiation Treatments         Plan Last Treated On Elapsed Days Fractions Treated Prescribed Fraction Dose (cGy) Prescribed Total Dose (cGy)    Prostate_Node 1/16/2025 29 @ 01/16/2025 20 of 20 300 6,000    PelvisMets 1/31/2025 0 @ 01/31/2025 1 of 3 900 2,700                  Reference Point Last Treated On Elapsed Days Most Recent Session Dose (cGy) Total Dose (cGy)    Prostate_Node 1/16/2025 29 @ 01/16/2025 300 6,000    PelvisMets 1/31/2025 0 @ 01/31/2025 900 900                            STEREOTACTIC PROCEDURE NOTE:    Called by Truebeam machine to verify treatment parameters including:  treatment site, treatment dose, and treatment setup prior to stereotactic treatment..    Patient was placed in the treatment position with use of immobilization device and  laser guidance. CBCT images were acquired for target localization.  Images were reviewed in the axial, coronal, and saggital views and shifts were made as necessary to ensure that patient position matched simulation position.      Treatment delivered per  prescription.  The medical physicist was present throughout the set-up, verification and treatment delivery to oversee the procedure and ensure all parameters agreed with the computerized plan.    I have personally reviewed the relevant data, performed the target localization, and determined all relevant factors for this patient’s simulation.

## 2025-02-03 ENCOUNTER — HOSPITAL ENCOUNTER (OUTPATIENT)
Dept: RADIATION ONCOLOGY | Facility: MEDICAL CENTER | Age: 70
End: 2025-02-03

## 2025-02-03 ENCOUNTER — HOSPITAL ENCOUNTER (OUTPATIENT)
Dept: RADIATION ONCOLOGY | Facility: MEDICAL CENTER | Age: 70
End: 2025-02-03
Attending: RADIOLOGY
Payer: MEDICARE

## 2025-02-03 LAB
CHEMOTHERAPY INFUSION START DATE: NORMAL
CHEMOTHERAPY RECORDS: 2700 CGY
CHEMOTHERAPY RECORDS: 9 GY
CHEMOTHERAPY RECORDS: NORMAL
CHEMOTHERAPY RX CANCER: NORMAL
DATE 1ST CHEMO CANCER: NORMAL
RAD ONC ARIA COURSE LAST TREATMENT DATE: NORMAL
RAD ONC ARIA COURSE TREATMENT ELAPSED DAYS: NORMAL
RAD ONC ARIA REFERENCE POINT DOSAGE GIVEN TO DATE: 18 GY
RAD ONC ARIA REFERENCE POINT ID: NORMAL
RAD ONC ARIA REFERENCE POINT SESSION DOSAGE GIVEN: 9 GY

## 2025-02-03 PROCEDURE — 77373 STRTCTC BDY RAD THER TX DLVR: CPT | Performed by: RADIOLOGY

## 2025-02-03 PROCEDURE — 77280 THER RAD SIMULAJ FIELD SMPL: CPT | Performed by: RADIOLOGY

## 2025-02-03 PROCEDURE — 77280 THER RAD SIMULAJ FIELD SMPL: CPT | Mod: 26 | Performed by: RADIOLOGY

## 2025-02-03 NOTE — PROCEDURES
DATE OF SERVICE: 2/3/2025    DIAGNOSIS:  Prostate cancer metastatic to bone (HCC)  Staging form: Prostate, AJCC 8th Edition  - Clinical stage from 7/2/2024: Stage IVB (cT3b, cN0, cM1b, PSA: 163, Grade Group: 3) - Signed by Magdaleno Yang M.D. on 7/2/2024  Histopathologic type: Adenocarcinoma, NOS  Stage prefix: Initial diagnosis  Prostate specific antigen (PSA) range: 20 or greater  F[place] score: 7  Histologic grading system: 5 grade system       TREATMENT:  Radiation Therapy Episodes       Active Episodes       Radiation Therapy: IMRT                   Radiation Treatments         Plan Last Treated On Elapsed Days Fractions Treated Prescribed Fraction Dose (cGy) Prescribed Total Dose (cGy)    Prostate_Node 1/16/2025 29 @ 01/16/2025 20 of 20 300 6,000    PelvisMets 2/3/2025 3 @ 02/03/2025 2 of 3 900 2,700                  Reference Point Last Treated On Elapsed Days Most Recent Session Dose (cGy) Total Dose (cGy)    Prostate_Node 1/16/2025 29 @ 01/16/2025 300 6,000    PelvisMets 2/3/2025 3 @ 02/03/2025 900 1,800                            STEREOTACTIC PROCEDURE NOTE:    Called by Truebeam machine to verify treatment parameters including:  treatment site, treatment dose, and treatment setup prior to stereotactic treatment..    Patient was placed in the treatment position with use of immobilization device and  laser guidance. CBCT images were acquired for target localization.  Images were reviewed in the axial, coronal, and saggital views and shifts were made as necessary to ensure that patient position matched simulation position.      Treatment delivered per  prescription.  The medical physicist was present throughout the set-up, verification and treatment delivery to oversee the procedure and ensure all parameters agreed with the computerized plan.    I have personally reviewed the relevant data, performed the target localization, and determined all relevant factors for this patient’s simulation.

## 2025-02-04 ENCOUNTER — OFFICE VISIT (OUTPATIENT)
Dept: UROLOGY | Facility: MEDICAL CENTER | Age: 70
End: 2025-02-04
Payer: MEDICARE

## 2025-02-04 DIAGNOSIS — C61 PROSTATE CANCER METASTATIC TO BONE (HCC): ICD-10-CM

## 2025-02-04 DIAGNOSIS — C79.51 PROSTATE CANCER METASTATIC TO BONE (HCC): ICD-10-CM

## 2025-02-04 DIAGNOSIS — R35.0 URINARY FREQUENCY: ICD-10-CM

## 2025-02-04 PROCEDURE — 51798 US URINE CAPACITY MEASURE: CPT | Performed by: UROLOGY

## 2025-02-04 PROCEDURE — 99213 OFFICE O/P EST LOW 20 MIN: CPT | Performed by: UROLOGY

## 2025-02-04 NOTE — PROGRESS NOTES
Chief Complaint: prostate cancer, urinary frequency    HPI: Manuel Quinn is a 69 y.o. male with a history of prostate cancer metastatic to bone, here today for follow up. He has one last planned treatment of radiotherapy tomorrow, and is on ADT with Lupron and abiraterone+prednisone.     He inquired today about possible prostate surgery (TURP) for urinary frequency that has been somewhat bothersome for the last couple months. He voids about every  hour during the day, and has to wake up a few times at night to void.    He's had no gross hematuria or dysuria.     Past Medical History:  Past Medical History:   Diagnosis Date    Inguinal hernia     L    Kidney stone        Past Surgical History:  History reviewed. No pertinent surgical history.    Family History:  Family History   Family history unknown: Yes       Social History:  Social History     Socioeconomic History    Marital status:      Spouse name: Not on file    Number of children: Not on file    Years of education: Not on file    Highest education level: Not on file   Occupational History    Not on file   Tobacco Use    Smoking status: Never    Smokeless tobacco: Never   Vaping Use    Vaping status: Never Used   Substance and Sexual Activity    Alcohol use: Yes     Comment: occ    Drug use: Never    Sexual activity: Not on file   Other Topics Concern    Not on file   Social History Narrative    Owns a consulting bulowell. Lives in [facility] and [place] with his wife Rashmi.      Social Drivers of Health     Financial Resource Strain: Not on file   Food Insecurity: Not on file   Transportation Needs: Not on file   Physical Activity: Not on file   Stress: Not on file   Social Connections: Not on file   Intimate Partner Violence: Not on file   Housing Stability: Not on file       Medications:  Current Outpatient Medications   Medication Sig Dispense Refill    methylPREDNISolone (MEDROL DOSEPAK) 4 MG Tablet Therapy Pack As directed on the  packaging label. 21 Tablet 0    predniSONE (DELTASONE) 5 MG Tab Take 1 Tablet by mouth 2 times a day. 180 Tablet 3    Abiraterone Acetate (ZYTIGA) 250 MG Tab Take 4 tablets (1,000 mg) by mouth every day. 120 Tablet 11    tamsulosin (FLOMAX) 0.4 MG capsule Take 2 Capsules by mouth 1/2 hour after breakfast for 360 days. Take before bed (not after breakfast) 180 Capsule 3    Ascorbic Acid (VITAMIN C) 1000 MG Tab Take 500 mg by mouth every day.      NON SPECIFIED Supplement called Quercitian      Cholecalciferol (VITAMIN D) 2000 UNIT Tab Take 2,000 Units by mouth every day.      Zinc 20 MG Cap Take 20 mg by mouth every day.       No current facility-administered medications for this visit.       Allergies:  Allergies   Allergen Reactions    Eggs Or Egg-Derived Products [Chicken-Derived Products] Diarrhea and Vomiting     25 years       Review of Systems:  Constitutional: Negative for fever, chills and malaise/fatigue.   HENT: Negative for congestion.    Eyes: Negative for pain.   Respiratory: Negative for cough and shortness of breath.    Cardiovascular: Negative for leg swelling.   Gastrointestinal: Negative for nausea, vomiting, abdominal pain and diarrhea.   Genitourinary: Negative for dysuria and hematuria.   Skin: Negative for rash.   Neurological: Negative for dizziness, focal weakness and headaches.   Endo/Heme/Allergies: Does not bruise/bleed easily.   Psychiatric/Behavioral: Negative for depression.  The patient is not nervous/anxious.        Physical Exam:  There were no vitals filed for this visit.    GENERAL: well appearing, well nourished, NAD  RESP: respiratory effort normal  ABDOMEN: soft, nontender, nondistended, no masses or organomegaly  SKIN/LYMPH: normal coloration and turgor, no suspicious skin lesions noted  NEURO/PSYCH: alert, oriented, normal speech, no focal findings or movement disorder noted  EXTREMITIES: no pedal edema noted  GENITOURINARY: normal male external genitalia and no bladder  tenderness to palpation  RECTAL: Exam Deferred    PVR: 33 mL  (bladder scanner)     Data Review:    Labs:  POCT UA   Lab Results   Component Value Date/Time    POCCOLOR yellow 06/04/2024 10:48 AM    POCAPPEAR clear 06/04/2024 10:48 AM    POCLEUKEST neg 06/04/2024 10:48 AM    POCNITRITE neg 06/04/2024 10:48 AM    POCUROBILIGE 0.2 06/04/2024 10:48 AM    POCPROTEIN neg 06/04/2024 10:48 AM    POCURPH 6.0 06/04/2024 10:48 AM    POCBLOOD neg 06/04/2024 10:48 AM    POCSPGRV 1.010 06/04/2024 10:48 AM    POCKETONES neg 06/04/2024 10:48 AM    POCBILIRUBIN neg 06/04/2024 10:48 AM    POCGLUCUA neg 06/04/2024 10:48 AM      CBC   Lab Results   Component Value Date/Time    WBC 9.4 01/07/2025 1152    RBC 4.91 01/07/2025 1152    HEMOGLOBIN 16.0 01/07/2025 1152    HEMATOCRIT 46.5 01/07/2025 1152    MCV 94.7 01/07/2025 1152    MCH 32.6 01/07/2025 1152    MCHC 34.4 01/07/2025 1152    RDW 42.1 01/07/2025 1152    MPV 10.1 01/07/2025 1152    LYMPHOCYTES 6.50 (L) 01/07/2025 1152    LYMPHS 0.61 (L) 01/07/2025 1152    MONOCYTES 11.80 01/07/2025 1152    MONOS 1.11 (H) 01/07/2025 1152    EOSINOPHILS 2.00 01/07/2025 1152    EOS 0.19 01/07/2025 1152    BASOPHILS 0.40 01/07/2025 1152    BASO 0.04 01/07/2025 1152    NRBC 0.00 01/07/2025 1152       CMP   Lab Results   Component Value Date/Time    SODIUM 136 01/07/2025 1152    POTASSIUM 4.8 01/07/2025 1152    CHLORIDE 99 01/07/2025 1152    CO2 24 01/07/2025 1152    ANION 13.0 01/07/2025 1152    GLUCOSE 107 (H) 01/07/2025 1152    BUN 36 (H) 01/07/2025 1152    CREATININE 0.92 01/07/2025 1152    GFRCKD 89 01/07/2025 1152    CALCIUM 9.6 01/07/2025 1152    CORRCALC 9.4 01/07/2025 1152    ASTSGOT 12 01/07/2025 1152    ALTSGPT 17 01/07/2025 1152    ALKPHOSPHAT 91 01/07/2025 1152    TBILIRUBIN 0.4 01/07/2025 1152    ALBUMIN 4.2 01/07/2025 1152    TOTPROTEIN 7.1 01/07/2025 1152    GLOBULIN 2.9 01/07/2025 1152    AGRATIO 1.4 01/07/2025 1152     PSA   Lab Results   Component Value Date/Time    PSATOTAL 0.67  01/07/2025 1152       Assessment: 69 y.o.male with a history of metastatic prostate cancer, treated with ADT (Lupron and abiraterone+prednisone) as well as radiotherapy (with plan for last treatment tomorrow) here for follow up with urinary frequency and nocturia. He inquired about possible surgical therapy, and I explained that while this may be possible, I'd advise on waiting a couple months after completion of the radiation treatments as urinary frequency, urgency, and nocturia often flare during and shortly after radiotherapy but then start to improve naturally. Moreover, there is an increased risk of urinary incontinence after TURP and other prostate procedures following radiotherapy, and as he's emptying his bladder well he would be better treated with overactive bladder therapies rather than BPH/bladder outlet surgery. I explained some of the medication available for overactive bladder, but for now he says the symptoms are tolerable and we'll reassess in a couple months.       Plan:    -Stay well hydrated, and try to avoid frequent bladder irritants in diet (coffee, tea, chocolate, carbonated beverages, spicy food, alcohol)  -Follow up in 2-3 months; if continued urinary frequency and nocturia can reconsider medical therapy    Miguel Austin MD

## 2025-02-05 ENCOUNTER — HOSPITAL ENCOUNTER (OUTPATIENT)
Dept: RADIATION ONCOLOGY | Facility: MEDICAL CENTER | Age: 70
End: 2025-02-05

## 2025-02-05 LAB
CHEMOTHERAPY INFUSION START DATE: NORMAL
CHEMOTHERAPY RECORDS: 2700 CGY
CHEMOTHERAPY RECORDS: 9 GY
CHEMOTHERAPY RECORDS: NORMAL
CHEMOTHERAPY RX CANCER: NORMAL
DATE 1ST CHEMO CANCER: NORMAL
RAD ONC ARIA COURSE LAST TREATMENT DATE: NORMAL
RAD ONC ARIA COURSE TREATMENT ELAPSED DAYS: NORMAL
RAD ONC ARIA REFERENCE POINT DOSAGE GIVEN TO DATE: 27 GY
RAD ONC ARIA REFERENCE POINT ID: NORMAL
RAD ONC ARIA REFERENCE POINT SESSION DOSAGE GIVEN: 9 GY

## 2025-02-05 PROCEDURE — 77280 THER RAD SIMULAJ FIELD SMPL: CPT | Performed by: RADIOLOGY

## 2025-02-05 PROCEDURE — 77280 THER RAD SIMULAJ FIELD SMPL: CPT | Mod: 26 | Performed by: RADIOLOGY

## 2025-02-05 PROCEDURE — 77373 STRTCTC BDY RAD THER TX DLVR: CPT | Performed by: RADIOLOGY

## 2025-02-05 PROCEDURE — 77336 RADIATION PHYSICS CONSULT: CPT | Mod: XU | Performed by: RADIOLOGY

## 2025-02-05 NOTE — PROCEDURES
DATE OF SERVICE: 2/5/2025    DIAGNOSIS:  Prostate cancer metastatic to bone (HCC)  Staging form: Prostate, AJCC 8th Edition  - Clinical stage from 7/2/2024: Stage IVB (cT3b, cN0, cM1b, PSA: 163, Grade Group: 3) - Signed by Magdaleno Yang M.D. on 7/2/2024  Histopathologic type: Adenocarcinoma, NOS  Stage prefix: Initial diagnosis  Prostate specific antigen (PSA) range: 20 or greater  [place] score: 7  Histologic grading system: 5 grade system       TREATMENT:  Radiation Therapy Episodes       Active Episodes       Radiation Therapy: IMRT                   Radiation Treatments         Plan Last Treated On Elapsed Days Fractions Treated Prescribed Fraction Dose (cGy) Prescribed Total Dose (cGy)    Prostate_Node 1/16/2025 29 @ 01/16/2025 20 of 20 300 6,000    PelvisMets 2/5/2025 5 @ 02/05/2025 3 of 3 900 2,700                  Reference Point Last Treated On Elapsed Days Most Recent Session Dose (cGy) Total Dose (cGy)    Prostate_Node 1/16/2025 29 @ 01/16/2025 300 6,000    PelvisMets 2/5/2025 5 @ 02/05/2025 900 2,700                            STEREOTACTIC PROCEDURE NOTE:    Called by Truebeam machine to verify treatment parameters including:  treatment site, treatment dose, and treatment setup prior to stereotactic treatment..    Patient was placed in the treatment position with use of immobilization device and  laser guidance. CBCT images were acquired for target localization.  Images were reviewed in the axial, coronal, and saggital views and shifts were made as necessary to ensure that patient position matched simulation position.      Treatment delivered per  prescription.  The medical physicist was present throughout the set-up, verification and treatment delivery to oversee the procedure and ensure all parameters agreed with the computerized plan.    I have personally reviewed the relevant data, performed the target localization, and determined all relevant factors for this patient’s simulation.

## 2025-02-05 NOTE — RADIATION COMPLETION NOTES
END OF TREATMENT SUMMARY    Patient name:  Manuel Quinn    Primary Physician:  Darling Franklin M.D. MRN: [id number]  CSN: [id number]   Referring physician:  No ref. provider found  : 1955, 69 y.o.       TREATMENT SUMMARY:        Course First Treatment Date 2025    Course Last Treatment Date 2025   Course Elapsed Days 5 @ 2025   Course Intent Palliative     Radiation Therapy Episodes       Active Episodes       Radiation Therapy: IMRT                   Radiation Treatments         Plan Last Treated On Elapsed Days Fractions Treated Prescribed Fraction Dose (cGy) Prescribed Total Dose (cGy)    Prostate_Node 2025 29 @ 2025 20 of 20 300 6,000    PelvisMets 2025 5 @ 2025 3 of 3 900 2,700                  Reference Point Last Treated On Elapsed Days Most Recent Session Dose (cGy) Total Dose (cGy)    Prostate_Node 2025 29 @ 2025 300 6,000    PelvisMets 2025 5 @ 2025 900 2,700                                     STAGE:   Prostate cancer metastatic to bone (HCC)  Staging form: Prostate, AJCC 8th Edition  - Clinical stage from 2024: Stage IVB (cT3b, cN0, cM1b, PSA: 163, Grade Group: 3) - Signed by Magdaleno Yang M.D. on 2024  Histopathologic type: Adenocarcinoma, NOS  Stage prefix: Initial diagnosis  Prostate specific antigen (PSA) range: 20 or greater  Aquiles score: 7  Histologic grading system: 5 grade system       TREATMENT INDICATION:   Definitive treatment of prostate and oligometastatic lesions     CONCURRENT SYSTEMIC TREATMENT:   ADT     RT COURSE DISCONTINUED EARLY:   No     PATIENT EXPERIENCE:       1/15/2025    11:42 AM 2025    11:36 AM 2025    11:35 AM 2024    12:00 PM 2024     2:33 PM   Toxicity Assessment   Toxicity Assessment Male Pelvis Male Pelvis Male Pelvis Male Pelvis Male Pelvis   Fatigue (lethargy, malaise, asthenia) None Increased fatigue over baseline, but not altering normal activities Increased fatigue  over baseline, but not altering normal activities None None   Radiation Dermatitis None None None None None   Anorexia None None None None None   Colitis None None None None None   Constipation None None None None None   Dehydration None None None None None   Diarrhea w/o Colostomy None None None None None   Flatulence None None None None None   Nausea None None None None None   Proctitis None None None None None   Vomiting None None None None None   RT - Pain due to RT None None None None None   Tumor Pain (onset or exacerbation of tumor pain due to treatment) None None None None None   Dysuria (painful urination) None None None None None   Urinary Frequency Increase >2x normal but <hourly Increase >2x normal but <hourly Increase >2x normal but <hourly Normal Normal   Urinary Urgency Present Present Present None None   Bladder Spasms Absent Absent Absent Absent Absent   Incontinence None None With coughing, sneezing, etc. None None   Urinary Retention Normal Normal Normal Normal Normal        FOLLOW-UP PLAN:   6 Weeks     COMMENT:          ANATOMIC TARGET SUMMARY    ANATOMIC TARGET MODALITY TECHNIQUE   Prostate and pelvic nodes, oligometastatic bony lesions   External beam, photons IMRT, SBRT            COMMENT:         DIAGRAMS:      DOSE VOLUME HISTOGRAMS:

## 2025-02-14 LAB
CHEMOTHERAPY INFUSION START DATE: NORMAL
CHEMOTHERAPY INFUSION STOP DATE: NORMAL
CHEMOTHERAPY RECORDS: 2700 CGY
CHEMOTHERAPY RECORDS: 9 GY
CHEMOTHERAPY RECORDS: NORMAL
CHEMOTHERAPY RX CANCER: NORMAL
DATE 1ST CHEMO CANCER: NORMAL
RAD ONC ARIA COURSE LAST TREATMENT DATE: NORMAL
RAD ONC ARIA COURSE TREATMENT ELAPSED DAYS: NORMAL
RAD ONC ARIA REFERENCE POINT DOSAGE GIVEN TO DATE: 27 GY
RAD ONC ARIA REFERENCE POINT ID: NORMAL

## 2025-03-20 ENCOUNTER — HOSPITAL ENCOUNTER (OUTPATIENT)
Dept: RADIATION ONCOLOGY | Facility: MEDICAL CENTER | Age: 70
End: 2025-03-20
Attending: RADIOLOGY
Payer: MEDICARE

## 2025-03-20 NOTE — PROGRESS NOTES
This visit is being conducted by telephone. This telephone visit was initiated by the patient and they verbally consented. Patient at home in [facility].      Reason for Call:  Post XRT follow up    Treatment History:     Radiation Oncology          2/5/2025 2/14/2025    13:53   Aria Course Treatment Dates   Course First Treatment Date 01/31/2025 01/31/2025    Course Last Treatment Date 02/05/2025 02/05/2025    Aria Treatment Summary   PelvisMets  Plan from Course C2 Pel mets   Fraction 3 of 3    3 of 3    Elapsed Course Days 5 @ 02/05/2025    5 @ 02/05/2025    Prescribed Fraction Dose 900 cGy    900 cGy    Prescribed Total Dose 2,700 cGy    2,700 cGy    PelvisMets  Reference Point from Course C2 Pel mets   Elapsed Course Days 5 @ 02/05/2025    5 @ 02/05/2025    Session Dose 900 cGy    --    Total Dose 2,700 cGy    2,700 cGy         HPI:    Subjective    This is a 69-year-old gentleman with minimal past medical history aside from BPH who unfortunately presented with acute urinary retention in March of this year.  At that time his PSA was noted to be 163, and a follow-up PSA about 2 weeks later was noted to be 148.  This prompted workup with an MRI prostate in May that showed likely prostate carcinoma extending to the seminal vesicles, as well as metastatic disease in the bilateral pelvis.  He had a follow-up prostate biopsy done on June 4 that confirmed [place] 73+4 adenocarcinoma with very high-volume disease.  He needed a Mon catheter for quite some time after this because of acute retention issues, and has also been started on Flomax 0.8 mg at bedtime.  Subsequent bone scan showed evidence of bilateral inferior pubic rami and left posterior rib lesions.  He met with medical oncology, was recommended likely for ADT along with consolidative radiation to the prostate, as well as a PSMA PET scan.  The PET scan was completed a few days ago and confirms largely the findings from the bone scan and the MRI with  metastatic disease involving the pubic rami, right posterior ilium and left posterior sacrum.  No rib disease was noted.  No adenopathy obvious on the PSMA PET scan.  He has started ADT with Lupron and Casodex.     He now comes to discuss radiation therapy.  He still quite significantly obstructed, IPSS score of 22.  Frank score 15.  Very minimal but notable signs of dysuria with pain at the tip of the penis about once or twice a day.  No fevers or chills or hematuria.  No cloudy urine.  He seems to not have a full grasp of his disease yet and comes to discuss all of this further today.  No bony pain.         10/14/2024 he comes today for routine follow-up.  We are having a telephone call to review treatment coordination.  Since I first saw him, he has been on ADT and has had a very nice response as follows:  Component  Ref Range & Units 12 d ago 1 mo ago 2 mo ago 6 mo ago   Prostatic Specific Antigen Tot  0.00 - 4.00 ng/mL 4.76 High  29.42 High  CM 67.90 High  .00 High  CM      His urination has improved mildly.  He still has nocturia about every 2 hours.  Stream is good.  No dysuria.  He is ready to proceed with consolidative radiation.        Interval History:   3/20/2025 he is doing okay.  He is voiding well.  About every 2 hours in the day, 2-3 times at night.  Stream is good.  No dysuria.  Tolerating ADT reasonably well.  Frustrated that he has low energy, but is able to manage with this.  Overall no complaints.    Labs / Images Reviewed:   No results found.    Assessment:   Prostate cancer metastatic to bone (HCC)  Staging form: Prostate, AJCC 8th Edition  - Clinical stage from 7/2/2024: Stage IVB (cT3b, cN0, cM1b, PSA: 163, Grade Group: 3) - Signed by Magdaleno Yang M.D. on 7/2/2024  Histopathologic type: Adenocarcinoma, NOS  Stage prefix: Initial diagnosis  Prostate specific antigen (PSA) range: 20 or greater  Aquiles score: 7  Histologic grading system: 5 grade system      Cancer Status:   High risk  prostate cancer, oligometastatic, finished radiation, continues with ADT    Plan:   At this point, he is going to be on continued ADT.  Therefore, he will have continued follow-up with medical oncology.  Given that he will have surveillance through that plan, he does not need radiation oncology for surveillance currently.  He will continue follow-up with medical oncology.  I will plan to see him on an as-needed basis.  He knows he can reach me at any point in the future as needed.    Time Spent on Call: 5 minutes      Time Spent in Preparation: 5 minutes    Total Time Spent: 10 minutes    Consuelo Jurado M.D.

## 2025-03-31 ENCOUNTER — HOSPITAL ENCOUNTER (OUTPATIENT)
Dept: LAB | Facility: MEDICAL CENTER | Age: 70
End: 2025-03-31
Attending: INTERNAL MEDICINE
Payer: MEDICARE

## 2025-03-31 DIAGNOSIS — C61 PROSTATE CANCER METASTATIC TO BONE (HCC): ICD-10-CM

## 2025-03-31 DIAGNOSIS — C79.51 PROSTATE CANCER METASTATIC TO BONE (HCC): ICD-10-CM

## 2025-03-31 LAB
25(OH)D3 SERPL-MCNC: 66 NG/ML (ref 30–100)
ALBUMIN SERPL BCP-MCNC: 4 G/DL (ref 3.2–4.9)
ALBUMIN/GLOB SERPL: 1.4 G/DL
ALP SERPL-CCNC: 98 U/L (ref 30–99)
ALT SERPL-CCNC: 15 U/L (ref 2–50)
ANION GAP SERPL CALC-SCNC: 13 MMOL/L (ref 7–16)
AST SERPL-CCNC: 19 U/L (ref 12–45)
BASOPHILS # BLD AUTO: 0.3 % (ref 0–1.8)
BASOPHILS # BLD: 0.02 K/UL (ref 0–0.12)
BILIRUB SERPL-MCNC: 0.5 MG/DL (ref 0.1–1.5)
BUN SERPL-MCNC: 26 MG/DL (ref 8–22)
CALCIUM ALBUM COR SERPL-MCNC: 9.8 MG/DL (ref 8.5–10.5)
CALCIUM SERPL-MCNC: 9.8 MG/DL (ref 8.5–10.5)
CHLORIDE SERPL-SCNC: 103 MMOL/L (ref 96–112)
CO2 SERPL-SCNC: 23 MMOL/L (ref 20–33)
CREAT SERPL-MCNC: 1.04 MG/DL (ref 0.5–1.4)
EOSINOPHIL # BLD AUTO: 0.16 K/UL (ref 0–0.51)
EOSINOPHIL NFR BLD: 2.1 % (ref 0–6.9)
ERYTHROCYTE [DISTWIDTH] IN BLOOD BY AUTOMATED COUNT: 45.2 FL (ref 35.9–50)
GFR SERPLBLD CREATININE-BSD FMLA CKD-EPI: 77 ML/MIN/1.73 M 2
GLOBULIN SER CALC-MCNC: 2.8 G/DL (ref 1.9–3.5)
GLUCOSE SERPL-MCNC: 163 MG/DL (ref 65–99)
HCT VFR BLD AUTO: 44.4 % (ref 42–52)
HGB BLD-MCNC: 15 G/DL (ref 14–18)
IMM GRANULOCYTES # BLD AUTO: 0.04 K/UL (ref 0–0.11)
IMM GRANULOCYTES NFR BLD AUTO: 0.5 % (ref 0–0.9)
LYMPHOCYTES # BLD AUTO: 0.27 K/UL (ref 1–4.8)
LYMPHOCYTES NFR BLD: 3.6 % (ref 22–41)
MCH RBC QN AUTO: 32.5 PG (ref 27–33)
MCHC RBC AUTO-ENTMCNC: 33.8 G/DL (ref 32.3–36.5)
MCV RBC AUTO: 96.3 FL (ref 81.4–97.8)
MONOCYTES # BLD AUTO: 0.47 K/UL (ref 0–0.85)
MONOCYTES NFR BLD AUTO: 6.3 % (ref 0–13.4)
NEUTROPHILS # BLD AUTO: 6.51 K/UL (ref 1.82–7.42)
NEUTROPHILS NFR BLD: 87.2 % (ref 44–72)
NRBC # BLD AUTO: 0 K/UL
NRBC BLD-RTO: 0 /100 WBC (ref 0–0.2)
PLATELET # BLD AUTO: 282 K/UL (ref 164–446)
PMV BLD AUTO: 10.4 FL (ref 9–12.9)
POTASSIUM SERPL-SCNC: 4.2 MMOL/L (ref 3.6–5.5)
PROT SERPL-MCNC: 6.8 G/DL (ref 6–8.2)
PSA SERPL DL<=0.01 NG/ML-MCNC: 0.12 NG/ML (ref 0–4)
RBC # BLD AUTO: 4.61 M/UL (ref 4.7–6.1)
SODIUM SERPL-SCNC: 139 MMOL/L (ref 135–145)
TESTOST SERPL-MCNC: <3 NG/DL (ref 175–781)
WBC # BLD AUTO: 7.5 K/UL (ref 4.8–10.8)

## 2025-03-31 PROCEDURE — 36415 COLL VENOUS BLD VENIPUNCTURE: CPT

## 2025-03-31 PROCEDURE — 82306 VITAMIN D 25 HYDROXY: CPT | Mod: GA

## 2025-03-31 PROCEDURE — 85025 COMPLETE CBC W/AUTO DIFF WBC: CPT

## 2025-03-31 PROCEDURE — 84403 ASSAY OF TOTAL TESTOSTERONE: CPT

## 2025-03-31 PROCEDURE — 84153 ASSAY OF PSA TOTAL: CPT

## 2025-03-31 PROCEDURE — 80053 COMPREHEN METABOLIC PANEL: CPT

## 2025-04-01 ENCOUNTER — TELEPHONE (OUTPATIENT)
Dept: UROLOGY | Facility: MEDICAL CENTER | Age: 70
End: 2025-04-01
Payer: MEDICARE

## 2025-04-01 DIAGNOSIS — R35.0 BENIGN PROSTATIC HYPERPLASIA WITH URINARY FREQUENCY: ICD-10-CM

## 2025-04-01 DIAGNOSIS — N40.1 BENIGN PROSTATIC HYPERPLASIA WITH URINARY FREQUENCY: ICD-10-CM

## 2025-04-01 NOTE — TELEPHONE ENCOUNTER
----- Message from QUINN SETHI sent at 4/1/2025  1:58 PM PDT -----  Regarding: [facility]  Patient has Thursday appt - Need new referral ASAP please

## 2025-04-02 ENCOUNTER — TELEPHONE (OUTPATIENT)
Dept: UROLOGY | Facility: MEDICAL CENTER | Age: 70
End: 2025-04-02
Payer: MEDICARE

## 2025-04-03 ENCOUNTER — HOSPITAL ENCOUNTER (OUTPATIENT)
Dept: HEMATOLOGY ONCOLOGY | Facility: MEDICAL CENTER | Age: 70
End: 2025-04-03
Attending: INTERNAL MEDICINE
Payer: MEDICARE

## 2025-04-03 ENCOUNTER — OFFICE VISIT (OUTPATIENT)
Dept: UROLOGY | Facility: MEDICAL CENTER | Age: 70
End: 2025-04-03
Payer: MEDICARE

## 2025-04-03 VITALS
BODY MASS INDEX: 30.3 KG/M2 | WEIGHT: 211.64 LBS | OXYGEN SATURATION: 96 % | SYSTOLIC BLOOD PRESSURE: 120 MMHG | TEMPERATURE: 97 F | HEART RATE: 74 BPM | DIASTOLIC BLOOD PRESSURE: 78 MMHG | HEIGHT: 70 IN | RESPIRATION RATE: 17 BRPM

## 2025-04-03 DIAGNOSIS — R35.1 NOCTURIA: ICD-10-CM

## 2025-04-03 DIAGNOSIS — C79.51 PROSTATE CANCER METASTATIC TO BONE (HCC): ICD-10-CM

## 2025-04-03 DIAGNOSIS — C61 PROSTATE CANCER METASTATIC TO BONE (HCC): ICD-10-CM

## 2025-04-03 DIAGNOSIS — R35.0 URINARY FREQUENCY: ICD-10-CM

## 2025-04-03 PROCEDURE — 99214 OFFICE O/P EST MOD 30 MIN: CPT | Performed by: UROLOGY

## 2025-04-03 PROCEDURE — 1126F AMNT PAIN NOTED NONE PRSNT: CPT | Performed by: UROLOGY

## 2025-04-03 PROCEDURE — 99214 OFFICE O/P EST MOD 30 MIN: CPT | Performed by: INTERNAL MEDICINE

## 2025-04-03 PROCEDURE — 99212 OFFICE O/P EST SF 10 MIN: CPT | Performed by: INTERNAL MEDICINE

## 2025-04-03 RX ORDER — 0.9 % SODIUM CHLORIDE 0.9 %
10 VIAL (ML) INJECTION PRN
OUTPATIENT
Start: 2025-10-07

## 2025-04-03 RX ORDER — 0.9 % SODIUM CHLORIDE 0.9 %
3 VIAL (ML) INJECTION PRN
OUTPATIENT
Start: 2025-07-09

## 2025-04-03 RX ORDER — 0.9 % SODIUM CHLORIDE 0.9 %
VIAL (ML) INJECTION PRN
OUTPATIENT
Start: 2025-07-09

## 2025-04-03 RX ORDER — 0.9 % SODIUM CHLORIDE 0.9 %
10 VIAL (ML) INJECTION PRN
Status: CANCELLED | OUTPATIENT
Start: 2025-04-10

## 2025-04-03 RX ORDER — 0.9 % SODIUM CHLORIDE 0.9 %
3 VIAL (ML) INJECTION PRN
OUTPATIENT
Start: 2026-01-05

## 2025-04-03 RX ORDER — 0.9 % SODIUM CHLORIDE 0.9 %
VIAL (ML) INJECTION PRN
Status: CANCELLED | OUTPATIENT
Start: 2025-04-10

## 2025-04-03 RX ORDER — 0.9 % SODIUM CHLORIDE 0.9 %
10 VIAL (ML) INJECTION PRN
OUTPATIENT
Start: 2025-07-09

## 2025-04-03 RX ORDER — 0.9 % SODIUM CHLORIDE 0.9 %
VIAL (ML) INJECTION PRN
OUTPATIENT
Start: 2025-10-07

## 2025-04-03 RX ORDER — 0.9 % SODIUM CHLORIDE 0.9 %
10 VIAL (ML) INJECTION PRN
OUTPATIENT
Start: 2026-01-05

## 2025-04-03 RX ORDER — 0.9 % SODIUM CHLORIDE 0.9 %
3 VIAL (ML) INJECTION PRN
Status: CANCELLED | OUTPATIENT
Start: 2025-04-10

## 2025-04-03 RX ORDER — 0.9 % SODIUM CHLORIDE 0.9 %
3 VIAL (ML) INJECTION PRN
OUTPATIENT
Start: 2025-10-07

## 2025-04-03 RX ORDER — 0.9 % SODIUM CHLORIDE 0.9 %
VIAL (ML) INJECTION PRN
OUTPATIENT
Start: 2026-01-05

## 2025-04-03 ASSESSMENT — FIBROSIS 4 INDEX: FIB4 SCORE: 1.22

## 2025-04-03 ASSESSMENT — PAIN SCALES - GENERAL: PAINLEVEL_OUTOF10: NO PAIN

## 2025-04-03 NOTE — PATIENT INSTRUCTIONS
Lupron tomorrow and in 12 weeks      My appointment in 12 weeks, Dr Montero, on Lupron day  Blood britni tbefore

## 2025-04-03 NOTE — PROGRESS NOTES
PROGRESS NOTE: HEMATOLOGY/ONCOLOGY     Referring Physician:   Primary Care:  Darling Franklin M.D.    Diagnosis: Metastatic prostate cancer to bone    History of Presenting Illness:  Manuel Quinn is a 69 y.o. male who has a no significant past medical history except kidney stone several years ago.  He has a history of BPH and he presented with a urinary retention on the March 28, 2024.  His initial PSA was 163 and a follow-up PSA was 148 on April 11, 2024.  Patient had the MRI of the prostate on the May 11, 2024 which showed evidence of prostate cancer extending to the seminal vesicle and there was a signs of bilateral ischium bone metastasis.  Eventually, he had a prostate biopsy on June 4, 2024 which showed group 3, Aquiles 7 (3+4) prostate cancer.  He had a bone scan on May 29, 2024 which showed evidence of bilateral inferior pubic ramus and left posterior rib metastasis.  Patient had a PSMA PET scan on July 20, 2024 which showed evidence of bilateral pubic ramus, right posterior ilium, and a left posterior sacral metastasis.  His creatinine is 1.5 and his GFR is 50. His PSA came down from 148-67.9 on July 29, 2024.  Patient had a Lupron 22.5 mg on July 5, 2024.  Patient will have a second Lupron injection on October 4, 2024.  Patient had a PSA of 4.76 on October 2, 2024. He comes for routine follow-up today.      Interval History:    7/5/2024 : C 1 Lupron 22.5mg  8/1/2024 : Zytiga/prednisone.  10/4/2024 : C 2 Lupron 22.5 mg  1/10/25 : C 3 Lupron  4/4/25 : C 4 Lupron    Past Medical History:   Diagnosis Date    Inguinal hernia     L    Kidney stone        History reviewed. No pertinent surgical history.    Family History   Family history unknown: Yes       OB History   No obstetric history on file.       Allergies as of 04/03/2025 - Reviewed 04/03/2025   Allergen Reaction Noted    Eggs or egg-derived products [chicken-derived products] Diarrhea and Vomiting 03/12/2024         Current Outpatient  "Medications:     predniSONE (DELTASONE) 5 MG Tab, Take 1 Tablet by mouth 2 times a day., Disp: 180 Tablet, Rfl: 3    Abiraterone Acetate (ZYTIGA) 250 MG Tab, Take 4 tablets (1,000 mg) by mouth every day., Disp: 120 Tablet, Rfl: 11    tamsulosin (FLOMAX) 0.4 MG capsule, Take 2 Capsules by mouth 1/2 hour after breakfast for 360 days. Take before bed (not after breakfast), Disp: 180 Capsule, Rfl: 3    Ascorbic Acid (VITAMIN C) 1000 MG Tab, Take 500 mg by mouth every day., Disp: , Rfl:     NON SPECIFIED, Supplement called Quercitian, Disp: , Rfl:     Cholecalciferol (VITAMIN D) 2000 UNIT Tab, Take 2,000 Units by mouth every day., Disp: , Rfl:     Zinc 20 MG Cap, Take 20 mg by mouth every day., Disp: , Rfl:     methylPREDNISolone (MEDROL DOSEPAK) 4 MG Tablet Therapy Pack, As directed on the packaging label., Disp: 21 Tablet, Rfl: 0    Review of Systems:  Patient denies fever, chills, nausea, vomiting, chest pain, sob, blood in stools, black stool, blood in urine. All systems are reviewed See HPI.  He is complaining of significant fatigue.    Physical Exam:  /78 (BP Location: Right arm, Patient Position: Supine, BP Cuff Size: Adult)   Pulse 74   Temp 36.1 °C (97 °F) (Temporal)   Resp 17   Ht 1.778 m (5' 10\")   Wt 96 kg (211 lb 10.3 oz)   SpO2 96%  Body mass index is 30.37 kg/m².  Constitutional:  NAD, well appearing.  HEENT:   SIERRA EOMI  Cardiovascular: RRR.   No m/r/g. No carotid bruits.       Lungs:   Clear, no wheezing, no rales, no respiratory distress.  Abdomen: Soft, non-tender. + BS, no masses, no suprapubic tenderness, no hepatomegaly.  Extremities:  No pedal edema. Bilateral and radial pulses present.  Skin:  Warm and dry.    Neurologic: Alert & oriented x 3, CN II-XII grossly intact, strength and sensation grossly intact.  No focal deficits noted.  Psychiatric:  Affect normal, mood normal, judgment normal.      Assessment & Plan:    #1. Metastatic prostate cancer to the bone, T3bM1.  Patient had " Lupron 22.5 mg on July 5, 2024 and he will have c 4 Lupron tomorrow. He will have Lupron every 12 weeks indefinitely.  He is tolerating Lupron very well and that he does not have much of side effects. I explained to him in detail regarding prognosis and management of metastatic prostate cancer to the bone one more time.  His Aquiles score is 7 and the average patient will live 5 to 6 years from date of diagnosis.  Patient was seen by radiation oncology and he had a radiation treatment to the prostate. He is taking Zytiga 1000 mg p.o. daily and prednisone 5 mg twice a day very well and there are no significant side effects which will be continued.  He is complaining of weakness and fatigue and I recommended routine exercise 1 hour/day.  He is follow-up PSA was 0.12, vitamin D was 66, and testosterone level was undetectable.  We will continue current treatment.  I ordered CBC, CMP, PSA every 12 weeks x 12.    We will see this patient in 12 weeks right before Lupron injection with a follow-up CBC, CMP, PSA for continuous care.  I recommended for the patient to call us if there is any problem in between.    Thank you for allowing me to participate in the care for this patient. Please feel free to contact me for any questions or concerns.   Total time spent on chart review, clinic encounter, and documentation: 25 minutes.

## 2025-04-03 NOTE — PROGRESS NOTES
Chief Complaint: prostate cancer, urinary frequency and nocturia    HPI: Manuel Quinn is a 69 y.o. male with a history of prostate cancer metastatic to bone, here for follow up. He completed radiotherapy about 2 months ago, and with that treatment as well as ADT (on abiraterone and prednisone) his PSA has decreased from a peak of 148 to 0.12.     He has continued fatigue, but is doing well overall and is working on returning to more frequent exercise.    His urinary frequency continues at every 2-2.5 hours, and this is true overnight as well. We discussed medical therapy for OAB, but he is minimally bothered by the urinary symptoms and prefers to avoid more medication unless medically necessary.     Past Medical History:  Past Medical History:   Diagnosis Date    Inguinal hernia     L    Kidney stone        Past Surgical History:  History reviewed. No pertinent surgical history.    Family History:  Family History   Family history unknown: Yes       Social History:  Social History     Socioeconomic History    Marital status:      Spouse name: Not on file    Number of children: Not on file    Years of education: Not on file    Highest education level: Not on file   Occupational History    Not on file   Tobacco Use    Smoking status: Never    Smokeless tobacco: Never   Vaping Use    Vaping status: Never Used   Substance and Sexual Activity    Alcohol use: Yes     Comment: occ    Drug use: Never    Sexual activity: Not on file   Other Topics Concern    Not on file   Social History Narrative    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 Lives in [place], and [place] with his wife Rashmi.      Social Drivers of Health     Financial Resource Strain: Not on file   Food Insecurity: Not on file   Transportation Needs: Not on file   Physical Activity: Not on file   Stress: Not on file   Social Connections: Not on file   Intimate Partner Violence: Not on file   Housing Stability: Not on file       Medications:  Current  Outpatient Medications   Medication Sig Dispense Refill    methylPREDNISolone (MEDROL DOSEPAK) 4 MG Tablet Therapy Pack As directed on the packaging label. 21 Tablet 0    predniSONE (DELTASONE) 5 MG Tab Take 1 Tablet by mouth 2 times a day. 180 Tablet 3    Abiraterone Acetate (ZYTIGA) 250 MG Tab Take 4 tablets (1,000 mg) by mouth every day. 120 Tablet 11    tamsulosin (FLOMAX) 0.4 MG capsule Take 2 Capsules by mouth 1/2 hour after breakfast for 360 days. Take before bed (not after breakfast) 180 Capsule 3    Ascorbic Acid (VITAMIN C) 1000 MG Tab Take 500 mg by mouth every day.      NON SPECIFIED Supplement called Quercitian      Cholecalciferol (VITAMIN D) 2000 UNIT Tab Take 2,000 Units by mouth every day.      Zinc 20 MG Cap Take 20 mg by mouth every day.       No current facility-administered medications for this visit.       Allergies:  Allergies   Allergen Reactions    Eggs Or Egg-Derived Products [Chicken-Derived Products] Diarrhea and Vomiting     25 years       Review of Systems:  Constitutional: Negative for fever, chills. Positive for fatigue.   HENT: Negative for congestion.    Eyes: Negative for pain.   Respiratory: Negative for cough and shortness of breath.    Cardiovascular: Negative for leg swelling.   Gastrointestinal: Negative for nausea, vomiting, abdominal pain and diarrhea.   Genitourinary: Negative for dysuria and hematuria.   Skin: Negative for rash.   Neurological: Negative for dizziness, focal weakness and headaches.   Endo/Heme/Allergies: Does not bruise/bleed easily.   Psychiatric/Behavioral: Negative for depression.  The patient is not nervous/anxious.        Physical Exam:  There were no vitals filed for this visit.    GENERAL: well appearing, well nourished, NAD  RESP: respiratory effort normal  ABDOMEN: soft, nontender, nondistended, no masses or organomegaly  SKIN/LYMPH: normal coloration and turgor, no suspicious skin lesions noted  NEURO/PSYCH: alert, oriented, normal speech, no  focal findings or movement disorder noted  EXTREMITIES: no pedal edema noted    Data Review:    Labs:  POCT UA   Lab Results   Component Value Date/Time    POCCOLOR yellow 06/04/2024 10:48 AM    POCAPPEAR clear 06/04/2024 10:48 AM    POCLEUKEST neg 06/04/2024 10:48 AM    POCNITRITE neg 06/04/2024 10:48 AM    POCUROBILIGE 0.2 06/04/2024 10:48 AM    POCPROTEIN neg 06/04/2024 10:48 AM    POCURPH 6.0 06/04/2024 10:48 AM    POCBLOOD neg 06/04/2024 10:48 AM    POCSPGRV 1.010 06/04/2024 10:48 AM    POCKETONES neg 06/04/2024 10:48 AM    POCBILIRUBIN neg 06/04/2024 10:48 AM    POCGLUCUA neg 06/04/2024 10:48 AM      CBC   Lab Results   Component Value Date/Time    WBC 7.5 03/31/2025 1051    RBC 4.61 (L) 03/31/2025 1051    HEMOGLOBIN 15.0 03/31/2025 1051    HEMATOCRIT 44.4 03/31/2025 1051    MCV 96.3 03/31/2025 1051    MCH 32.5 03/31/2025 1051    MCHC 33.8 03/31/2025 1051    RDW 45.2 03/31/2025 1051    MPV 10.4 03/31/2025 1051    LYMPHOCYTES 3.60 (L) 03/31/2025 1051    LYMPHS 0.27 (L) 03/31/2025 1051    MONOCYTES 6.30 03/31/2025 1051    MONOS 0.47 03/31/2025 1051    EOSINOPHILS 2.10 03/31/2025 1051    EOS 0.16 03/31/2025 1051    BASOPHILS 0.30 03/31/2025 1051    BASO 0.02 03/31/2025 1051    NRBC 0.00 03/31/2025 1051       CMP   Lab Results   Component Value Date/Time    SODIUM 139 03/31/2025 1051    POTASSIUM 4.2 03/31/2025 1051    CHLORIDE 103 03/31/2025 1051    CO2 23 03/31/2025 1051    ANION 13.0 03/31/2025 1051    GLUCOSE 163 (H) 03/31/2025 1051    BUN 26 (H) 03/31/2025 1051    CREATININE 1.04 03/31/2025 1051    GFRCKD 77 03/31/2025 1051    CALCIUM 9.8 03/31/2025 1051    CORRCALC 9.8 03/31/2025 1051    ASTSGOT 19 03/31/2025 1051    ALTSGPT 15 03/31/2025 1051    ALKPHOSPHAT 98 03/31/2025 1051    TBILIRUBIN 0.5 03/31/2025 1051    ALBUMIN 4.0 03/31/2025 1051    TOTPROTEIN 6.8 03/31/2025 1051    GLOBULIN 2.8 03/31/2025 1051    AGRATIO 1.4 03/31/2025 1051         Assessment: 69 y.o.male with a history of prostate cancer  metastatic to bone, with an excellent response to abiraterone, lupron, and radiotherapy. He is doing well overall though has bothersome fatigue. He also has continued urinary frequency and nocturia about 2 months after completion of his radiotherapy.     We discussed the importance of quality sleep, nutrition, and exercise (including resistance training) to help counter the effects of ongoing androgen deprivation therapy. I counseled him to start some resistance training, and work with a  at first to help avoid injury.     We also discussed medical therapy for urinary frequency and nocturia (including both anticholinergics and beta-3 agonists). He'd like to avoid medication for now as he has been able to tolerate the urinary frequency.       Plan:    -Decrease tamsulosin to one capsule daily, taken before bed  -Increase hydration during the day, but try to avoid fluids for 2 hours before bedtime  -Limit intake of bladder irritants (coffee, tea, chocolate, spicy foods, carbonated beverages, alcohol)  -Can consider medical therapy for OAB if symptoms are increasingly bothersome; ok to request via phone call or MyChart if needed  -Continue follow up with medical oncology for management of ADT and PSA surveillance  -Follow up in 6 months or sooner as needed    Miguel Austin MD

## 2025-04-04 ENCOUNTER — OUTPATIENT INFUSION SERVICES (OUTPATIENT)
Dept: ONCOLOGY | Facility: MEDICAL CENTER | Age: 70
End: 2025-04-04
Attending: ALLERGY & IMMUNOLOGY
Payer: MEDICARE

## 2025-04-04 VITALS
RESPIRATION RATE: 18 BRPM | HEIGHT: 70 IN | OXYGEN SATURATION: 100 % | TEMPERATURE: 97.8 F | WEIGHT: 216.71 LBS | DIASTOLIC BLOOD PRESSURE: 69 MMHG | SYSTOLIC BLOOD PRESSURE: 116 MMHG | BODY MASS INDEX: 31.03 KG/M2 | HEART RATE: 79 BPM

## 2025-04-04 DIAGNOSIS — C79.51 PROSTATE CANCER METASTATIC TO BONE (HCC): ICD-10-CM

## 2025-04-04 DIAGNOSIS — C61 PROSTATE CANCER METASTATIC TO BONE (HCC): ICD-10-CM

## 2025-04-04 PROCEDURE — 96402 CHEMO HORMON ANTINEOPL SQ/IM: CPT

## 2025-04-04 PROCEDURE — 700111 HCHG RX REV CODE 636 W/ 250 OVERRIDE (IP): Mod: JZ,TB | Performed by: INTERNAL MEDICINE

## 2025-04-04 RX ADMIN — LEUPROLIDE ACETATE 22.5 MG: KIT at 11:32

## 2025-04-04 ASSESSMENT — FIBROSIS 4 INDEX: FIB4 SCORE: 1.22

## 2025-04-04 NOTE — Clinical Note
REFERRAL APPROVAL NOTICE         Sent on April 3, 2025                   Manuel Quinn  [address] [place] [address]                   Dear Mr. Quinn,    After a careful review of the medical information and benefit coverage, [facility] has processed your referral. See below for additional details.    If applicable, you must be actively enrolled with your insurance for coverage of the authorized service. If you have any questions regarding your coverage, please contact your insurance directly.    REFERRAL INFORMATION   Referral #:  [id number]  Referred-To Department    Referred-By Provider:  Urology    Miguel Austin M.D.   [facility]      [address]  [place] [id number] [phone] [address]  [place] [id number] [phone]    Referral Start Date:  04/01/2025  Referral End Date:   04/01/2026             SCHEDULING  If you do not already have an appointment, please call [phone] to make an appointment.     MORE INFORMATION  If you do not already have a [facility] [facility] account, sign up at: [email or web address]  You can access your medical information, make appointments, see lab results, billing information, and more.  If you have questions regarding this referral, please contact  the [facility] Referrals department at:             [phone]. Monday - Friday 8:00AM - 5:00PM.     Sincerely,    [facility]

## 2025-04-04 NOTE — PROGRESS NOTES
Pt presented into [facility] for a Lupron injection.  Pt denied having any new complaints. Pt had PSA drawn on 3/31, pt declines PSA draw today. Lupron injection given in right dorsogluteal IM. Pt tolerated well, band-aid applied to injection site. Next appointment confirmed. Pt discharged to home in good condition.

## 2025-04-10 ENCOUNTER — APPOINTMENT (OUTPATIENT)
Dept: URBAN - METROPOLITAN AREA CLINIC 6 | Facility: CLINIC | Age: 70
Setting detail: DERMATOLOGY
End: 2025-04-10

## 2025-04-10 DIAGNOSIS — Z71.89 OTHER SPECIFIED COUNSELING: ICD-10-CM

## 2025-04-10 DIAGNOSIS — D22 MELANOCYTIC NEVI: ICD-10-CM

## 2025-04-10 DIAGNOSIS — L57.0 ACTINIC KERATOSIS: ICD-10-CM

## 2025-04-10 DIAGNOSIS — L82.1 OTHER SEBORRHEIC KERATOSIS: ICD-10-CM

## 2025-04-10 DIAGNOSIS — D18.0 HEMANGIOMA: ICD-10-CM

## 2025-04-10 DIAGNOSIS — L81.4 OTHER MELANIN HYPERPIGMENTATION: ICD-10-CM

## 2025-04-10 DIAGNOSIS — Z86.006 PERSONAL HISTORY OF MELANOMA IN-SITU: ICD-10-CM

## 2025-04-10 PROBLEM — D22.62 MELANOCYTIC NEVI OF LEFT UPPER LIMB, INCLUDING SHOULDER: Status: ACTIVE | Noted: 2025-04-10

## 2025-04-10 PROBLEM — D18.01 HEMANGIOMA OF SKIN AND SUBCUTANEOUS TISSUE: Status: ACTIVE | Noted: 2025-04-10

## 2025-04-10 PROBLEM — D22.4 MELANOCYTIC NEVI OF SCALP AND NECK: Status: ACTIVE | Noted: 2025-04-10

## 2025-04-10 PROBLEM — D22.61 MELANOCYTIC NEVI OF RIGHT UPPER LIMB, INCLUDING SHOULDER: Status: ACTIVE | Noted: 2025-04-10

## 2025-04-10 PROBLEM — D22.5 MELANOCYTIC NEVI OF TRUNK: Status: ACTIVE | Noted: 2025-04-10

## 2025-04-10 PROCEDURE — ? SUNSCREEN RECOMMENDATIONS

## 2025-04-10 PROCEDURE — ? LIQUID NITROGEN

## 2025-04-10 PROCEDURE — 17003 DESTRUCT PREMALG LES 2-14: CPT

## 2025-04-10 PROCEDURE — ? COUNSELING

## 2025-04-10 PROCEDURE — 99213 OFFICE O/P EST LOW 20 MIN: CPT | Mod: 25

## 2025-04-10 PROCEDURE — 17000 DESTRUCT PREMALG LESION: CPT

## 2025-04-10 ASSESSMENT — LOCATION DETAILED DESCRIPTION DERM
LOCATION DETAILED: RIGHT CENTRAL FRONTAL SCALP
LOCATION DETAILED: LEFT MEDIAL INFERIOR CHEST
LOCATION DETAILED: LEFT POSTERIOR NECK
LOCATION DETAILED: LEFT ANTERIOR PROXIMAL UPPER ARM
LOCATION DETAILED: LEFT DISTAL POSTERIOR UPPER ARM
LOCATION DETAILED: RIGHT PROXIMAL POSTERIOR UPPER ARM
LOCATION DETAILED: RIGHT ANTERIOR PROXIMAL UPPER ARM
LOCATION DETAILED: LEFT SUPERIOR LATERAL MIDBACK
LOCATION DETAILED: LEFT ANTERIOR DISTAL UPPER ARM
LOCATION DETAILED: LEFT PROXIMAL POSTERIOR UPPER ARM
LOCATION DETAILED: RIGHT ANTERIOR DISTAL UPPER ARM
LOCATION DETAILED: LEFT INFERIOR MEDIAL MALAR CHEEK
LOCATION DETAILED: LEFT INFERIOR UPPER BACK
LOCATION DETAILED: LEFT SUPERIOR POSTERIOR NECK
LOCATION DETAILED: LEFT CENTRAL MALAR CHEEK
LOCATION DETAILED: EPIGASTRIC SKIN
LOCATION DETAILED: RIGHT MEDIAL FRONTAL SCALP
LOCATION DETAILED: RIGHT DISTAL POSTERIOR UPPER ARM
LOCATION DETAILED: MID POSTERIOR NECK

## 2025-04-10 ASSESSMENT — LOCATION ZONE DERM
LOCATION ZONE: NECK
LOCATION ZONE: SCALP
LOCATION ZONE: ARM
LOCATION ZONE: TRUNK
LOCATION ZONE: FACE

## 2025-04-10 ASSESSMENT — LOCATION SIMPLE DESCRIPTION DERM
LOCATION SIMPLE: ABDOMEN
LOCATION SIMPLE: LEFT LOWER BACK
LOCATION SIMPLE: POSTERIOR NECK
LOCATION SIMPLE: RIGHT UPPER ARM
LOCATION SIMPLE: LEFT UPPER ARM
LOCATION SIMPLE: RIGHT SCALP
LOCATION SIMPLE: CHEST
LOCATION SIMPLE: LEFT CHEEK
LOCATION SIMPLE: LEFT UPPER BACK

## 2025-04-10 NOTE — PROCEDURE: LIQUID NITROGEN
Render Post-Care Instructions In Note?: no
Number Of Freeze-Thaw Cycles: 2 freeze-thaw cycles
Consent: The patient's verbal consent was obtained including but not limited to risks of crusting, scabbing, blistering, scarring, darker or lighter pigmentary change, recurrence, incomplete removal and infection.
Detail Level: Zone
Show Aperture Variable?: Yes
Post-Care Instructions: I reviewed with the patient in detail post-care instructions. Patient is to wear sunprotection, and avoid picking at any of the treated lesions. Pt may apply Vaseline to crusted or scabbing areas.
Duration Of Freeze Thaw-Cycle (Seconds): 8

## 2025-04-12 DIAGNOSIS — C61 PROSTATE CANCER METASTATIC TO BONE (HCC): ICD-10-CM

## 2025-04-12 DIAGNOSIS — C79.51 PROSTATE CANCER METASTATIC TO BONE (HCC): ICD-10-CM

## 2025-04-15 RX ORDER — TAMSULOSIN HYDROCHLORIDE 0.4 MG/1
0.4 CAPSULE ORAL
Qty: 90 CAPSULE | Refills: 3 | Status: SHIPPED | OUTPATIENT
Start: 2025-04-15

## 2025-04-15 NOTE — TELEPHONE ENCOUNTER
HISTORY OF PRESENT ILLNESS:       Closed Reduction, Foot, With Pinning, Second Metatarsal - Left 7/26/2023      Pt is here today for Second post-operative followup of his Closed Reduction, Foot, With Pinning, Second Metatarsal - Left.  he is doing well.  We have reviewed his findings and discussed plan of care and future treatment options, including the physical therapy plan.      Pt is 6 weeks post pinning left foot. States he has been having lots of pain, swelling and states there has been clear pulse coming from around incision.                                                                               PHYSICAL EXAMINATION:     Incision sites healed well  No evidence of any erythema, infection or induration  Minimal effusion  2+ DP pulse    X-Ray Foot Complete 3 view Left    Result Date: 9/7/2023  See Procedure Notes for results. IMPRESSION: Please see Ortho procedure notes for report.  This procedure was auto-finalized by: Virtual Radiologist  Three views left foot were obtained today demonstrating a healing 2nd metatarsal fracture with pin fixation proper position.  No adverse interval change demonstrated                                                                               ASSESSMENT:                                                                                                                                               1. Status post above, doing well.                                                                                                                               PLAN:       Wounds were treated today; pin was removed today atraumatically  DVT prophylaxis discussed  Therapy plan discussed in great detail today; all questions answered.              Transition to Cam boot see back in 4 weeks hold out of work at this time                                                                                                                                   There are no Patient Instructions  Received request via: Patient    Was the patient seen in the last year in this department? Yes    Does the patient have an active prescription (recently filled or refills available) for medication(s) requested? No    Pharmacy Name: [facility]/pharmacy #[id number] - [place] - [address]     Does the patient have long term Plus and need 100-day supply? (This applies to ALL medications) Patient does not have SCP   on file for this visit.

## 2025-06-27 ENCOUNTER — HOSPITAL ENCOUNTER (OUTPATIENT)
Dept: RADIOLOGY | Facility: MEDICAL CENTER | Age: 70
End: 2025-06-27
Attending: INTERNAL MEDICINE
Payer: MEDICARE

## 2025-06-27 DIAGNOSIS — M81.8 OTHER OSTEOPOROSIS WITHOUT CURRENT PATHOLOGICAL FRACTURE: ICD-10-CM

## 2025-06-27 DIAGNOSIS — C79.51 PROSTATE CANCER METASTATIC TO BONE (HCC): ICD-10-CM

## 2025-06-27 DIAGNOSIS — C61 PROSTATE CANCER METASTATIC TO BONE (HCC): ICD-10-CM

## 2025-06-27 DIAGNOSIS — Q78.2 OSTEOPETROSIS: ICD-10-CM

## 2025-06-27 PROCEDURE — 77080 DXA BONE DENSITY AXIAL: CPT

## 2025-07-08 RX ORDER — 0.9 % SODIUM CHLORIDE 0.9 %
3 VIAL (ML) INJECTION PRN
Status: CANCELLED | OUTPATIENT
Start: 2025-07-11

## 2025-07-08 RX ORDER — 0.9 % SODIUM CHLORIDE 0.9 %
10 VIAL (ML) INJECTION PRN
Status: CANCELLED | OUTPATIENT
Start: 2025-07-11

## 2025-07-08 RX ORDER — 0.9 % SODIUM CHLORIDE 0.9 %
VIAL (ML) INJECTION PRN
Status: CANCELLED | OUTPATIENT
Start: 2025-07-11

## 2025-07-09 ENCOUNTER — HOSPITAL ENCOUNTER (OUTPATIENT)
Dept: LAB | Facility: MEDICAL CENTER | Age: 70
End: 2025-07-09
Attending: INTERNAL MEDICINE
Payer: MEDICARE

## 2025-07-09 DIAGNOSIS — C61 PROSTATE CANCER METASTATIC TO BONE (HCC): ICD-10-CM

## 2025-07-09 DIAGNOSIS — C79.51 PROSTATE CANCER METASTATIC TO BONE (HCC): ICD-10-CM

## 2025-07-09 LAB
25(OH)D3 SERPL-MCNC: 106 NG/ML (ref 30–100)
ALBUMIN SERPL BCP-MCNC: 4.3 G/DL (ref 3.2–4.9)
ALBUMIN/GLOB SERPL: 1.5 G/DL
ALP SERPL-CCNC: 127 U/L (ref 30–99)
ALT SERPL-CCNC: 17 U/L (ref 2–50)
ANION GAP SERPL CALC-SCNC: 13 MMOL/L (ref 7–16)
AST SERPL-CCNC: 17 U/L (ref 12–45)
BASOPHILS # BLD AUTO: 0.4 % (ref 0–1.8)
BASOPHILS # BLD: 0.03 K/UL (ref 0–0.12)
BILIRUB SERPL-MCNC: 0.6 MG/DL (ref 0.1–1.5)
BUN SERPL-MCNC: 34 MG/DL (ref 8–22)
CALCIUM ALBUM COR SERPL-MCNC: 9.7 MG/DL (ref 8.5–10.5)
CALCIUM SERPL-MCNC: 9.9 MG/DL (ref 8.5–10.5)
CHLORIDE SERPL-SCNC: 103 MMOL/L (ref 96–112)
CO2 SERPL-SCNC: 20 MMOL/L (ref 20–33)
CREAT SERPL-MCNC: 1.12 MG/DL (ref 0.5–1.4)
EOSINOPHIL # BLD AUTO: 0.04 K/UL (ref 0–0.51)
EOSINOPHIL NFR BLD: 0.6 % (ref 0–6.9)
ERYTHROCYTE [DISTWIDTH] IN BLOOD BY AUTOMATED COUNT: 44.3 FL (ref 35.9–50)
GFR SERPLBLD CREATININE-BSD FMLA CKD-EPI: 70 ML/MIN/1.73 M 2
GLOBULIN SER CALC-MCNC: 2.8 G/DL (ref 1.9–3.5)
GLUCOSE SERPL-MCNC: 149 MG/DL (ref 65–99)
HCT VFR BLD AUTO: 45.9 % (ref 42–52)
HGB BLD-MCNC: 15.5 G/DL (ref 14–18)
IMM GRANULOCYTES # BLD AUTO: 0.03 K/UL (ref 0–0.11)
IMM GRANULOCYTES NFR BLD AUTO: 0.4 % (ref 0–0.9)
LYMPHOCYTES # BLD AUTO: 0.47 K/UL (ref 1–4.8)
LYMPHOCYTES NFR BLD: 7 % (ref 22–41)
MCH RBC QN AUTO: 31.6 PG (ref 27–33)
MCHC RBC AUTO-ENTMCNC: 33.8 G/DL (ref 32.3–36.5)
MCV RBC AUTO: 93.7 FL (ref 81.4–97.8)
MONOCYTES # BLD AUTO: 0.49 K/UL (ref 0–0.85)
MONOCYTES NFR BLD AUTO: 7.3 % (ref 0–13.4)
NEUTROPHILS # BLD AUTO: 5.64 K/UL (ref 1.82–7.42)
NEUTROPHILS NFR BLD: 84.3 % (ref 44–72)
NRBC # BLD AUTO: 0 K/UL
NRBC BLD-RTO: 0 /100 WBC (ref 0–0.2)
PLATELET # BLD AUTO: 323 K/UL (ref 164–446)
PMV BLD AUTO: 10.1 FL (ref 9–12.9)
POTASSIUM SERPL-SCNC: 4.8 MMOL/L (ref 3.6–5.5)
PROT SERPL-MCNC: 7.1 G/DL (ref 6–8.2)
PSA SERPL DL<=0.01 NG/ML-MCNC: 0.06 NG/ML (ref 0–4)
RBC # BLD AUTO: 4.9 M/UL (ref 4.7–6.1)
SODIUM SERPL-SCNC: 136 MMOL/L (ref 135–145)
WBC # BLD AUTO: 6.7 K/UL (ref 4.8–10.8)

## 2025-07-09 PROCEDURE — 82306 VITAMIN D 25 HYDROXY: CPT | Mod: GA

## 2025-07-09 PROCEDURE — 80053 COMPREHEN METABOLIC PANEL: CPT

## 2025-07-09 PROCEDURE — 84153 ASSAY OF PSA TOTAL: CPT

## 2025-07-09 PROCEDURE — 36415 COLL VENOUS BLD VENIPUNCTURE: CPT

## 2025-07-09 PROCEDURE — 85025 COMPLETE CBC W/AUTO DIFF WBC: CPT

## 2025-07-10 ENCOUNTER — APPOINTMENT (OUTPATIENT)
Dept: URBAN - METROPOLITAN AREA CLINIC 6 | Facility: CLINIC | Age: 70
Setting detail: DERMATOLOGY
End: 2025-07-10

## 2025-07-10 DIAGNOSIS — Z71.89 OTHER SPECIFIED COUNSELING: ICD-10-CM

## 2025-07-10 DIAGNOSIS — L82.1 OTHER SEBORRHEIC KERATOSIS: ICD-10-CM

## 2025-07-10 DIAGNOSIS — D22 MELANOCYTIC NEVI: ICD-10-CM

## 2025-07-10 DIAGNOSIS — D18.0 HEMANGIOMA: ICD-10-CM

## 2025-07-10 DIAGNOSIS — Z86.006 PERSONAL HISTORY OF MELANOMA IN-SITU: ICD-10-CM

## 2025-07-10 DIAGNOSIS — L81.4 OTHER MELANIN HYPERPIGMENTATION: ICD-10-CM

## 2025-07-10 DIAGNOSIS — L57.0 ACTINIC KERATOSIS: ICD-10-CM

## 2025-07-10 PROBLEM — D22.4 MELANOCYTIC NEVI OF SCALP AND NECK: Status: ACTIVE | Noted: 2025-07-10

## 2025-07-10 PROBLEM — D48.5 NEOPLASM OF UNCERTAIN BEHAVIOR OF SKIN: Status: ACTIVE | Noted: 2025-07-10

## 2025-07-10 PROBLEM — D22.39 MELANOCYTIC NEVI OF OTHER PARTS OF FACE: Status: ACTIVE | Noted: 2025-07-10

## 2025-07-10 PROBLEM — D22.61 MELANOCYTIC NEVI OF RIGHT UPPER LIMB, INCLUDING SHOULDER: Status: ACTIVE | Noted: 2025-07-10

## 2025-07-10 PROBLEM — D22.5 MELANOCYTIC NEVI OF TRUNK: Status: ACTIVE | Noted: 2025-07-10

## 2025-07-10 PROBLEM — D22.62 MELANOCYTIC NEVI OF LEFT UPPER LIMB, INCLUDING SHOULDER: Status: ACTIVE | Noted: 2025-07-10

## 2025-07-10 PROBLEM — D18.01 HEMANGIOMA OF SKIN AND SUBCUTANEOUS TISSUE: Status: ACTIVE | Noted: 2025-07-10

## 2025-07-10 PROCEDURE — ? BIOPSY BY SHAVE METHOD

## 2025-07-10 PROCEDURE — ? SUNSCREEN RECOMMENDATIONS

## 2025-07-10 PROCEDURE — ? COUNSELING

## 2025-07-10 PROCEDURE — ? ADDITIONAL NOTES

## 2025-07-10 PROCEDURE — ? LIQUID NITROGEN

## 2025-07-10 ASSESSMENT — LOCATION SIMPLE DESCRIPTION DERM
LOCATION SIMPLE: LEFT CHEEK
LOCATION SIMPLE: LEFT SCALP
LOCATION SIMPLE: LEFT LOWER BACK
LOCATION SIMPLE: LEFT FOREHEAD
LOCATION SIMPLE: SCALP
LOCATION SIMPLE: LEFT FOREARM
LOCATION SIMPLE: RIGHT SHOULDER
LOCATION SIMPLE: CHEST
LOCATION SIMPLE: RIGHT UPPER ARM
LOCATION SIMPLE: RIGHT EAR
LOCATION SIMPLE: LEFT UPPER BACK
LOCATION SIMPLE: ABDOMEN
LOCATION SIMPLE: POSTERIOR NECK
LOCATION SIMPLE: LEFT UPPER ARM
LOCATION SIMPLE: RIGHT UPPER BACK

## 2025-07-10 ASSESSMENT — LOCATION DETAILED DESCRIPTION DERM
LOCATION DETAILED: LEFT POSTERIOR NECK
LOCATION DETAILED: LEFT SUPERIOR LATERAL MIDBACK
LOCATION DETAILED: RIGHT LATERAL SUPERIOR CHEST
LOCATION DETAILED: LEFT SUPERIOR PARIETAL SCALP
LOCATION DETAILED: RIGHT POSTERIOR SHOULDER
LOCATION DETAILED: LEFT MEDIAL INFERIOR CHEST
LOCATION DETAILED: MID POSTERIOR NECK
LOCATION DETAILED: LEFT CENTRAL MALAR CHEEK
LOCATION DETAILED: LEFT INFERIOR UPPER BACK
LOCATION DETAILED: EPIGASTRIC SKIN
LOCATION DETAILED: LEFT MEDIAL FOREHEAD
LOCATION DETAILED: LEFT DISTAL POSTERIOR UPPER ARM
LOCATION DETAILED: LEFT INFERIOR MEDIAL MALAR CHEEK
LOCATION DETAILED: LEFT ANTERIOR DISTAL UPPER ARM
LOCATION DETAILED: RIGHT ANTERIOR DISTAL UPPER ARM
LOCATION DETAILED: RIGHT DISTAL POSTERIOR UPPER ARM
LOCATION DETAILED: RIGHT ANTERIOR PROXIMAL UPPER ARM
LOCATION DETAILED: LEFT PROXIMAL POSTERIOR UPPER ARM
LOCATION DETAILED: RIGHT SUPERIOR POSTERIOR HELIX
LOCATION DETAILED: LEFT ANTERIOR PROXIMAL UPPER ARM
LOCATION DETAILED: RIGHT PROXIMAL POSTERIOR UPPER ARM
LOCATION DETAILED: RIGHT INFERIOR LATERAL UPPER BACK
LOCATION DETAILED: LEFT MEDIAL FRONTAL SCALP
LOCATION DETAILED: RIGHT INFERIOR CRUS OF ANTIHELIX
LOCATION DETAILED: LEFT DISTAL DORSAL FOREARM
LOCATION DETAILED: LEFT SUPERIOR POSTERIOR NECK

## 2025-07-10 ASSESSMENT — LOCATION ZONE DERM
LOCATION ZONE: ARM
LOCATION ZONE: TRUNK
LOCATION ZONE: EAR
LOCATION ZONE: SCALP
LOCATION ZONE: FACE
LOCATION ZONE: NECK

## 2025-07-10 NOTE — PROCEDURE: LIQUID NITROGEN
Render Note In Bullet Format When Appropriate: No
Duration Of Freeze Thaw-Cycle (Seconds): 8
Detail Level: Zone
Show Aperture Variable?: Yes
Number Of Freeze-Thaw Cycles: 2 freeze-thaw cycles
Post-Care Instructions: I reviewed with the patient in detail post-care instructions. Patient is to wear sunprotection, and avoid picking at any of the treated lesions. Pt may apply Vaseline to crusted or scabbing areas.
Consent: The patient's verbal consent was obtained including but not limited to risks of crusting, scabbing, blistering, scarring, darker or lighter pigmentary change, recurrence, incomplete removal and infection.

## 2025-07-10 NOTE — PROCEDURE: MIPS QUALITY
Quality 130: Documentation Of Current Medications In The Medical Record: Current Medications Documented
Quality 137: Melanoma: Continuity Of Care - Recall System: Patient information entered into a recall system that includes: target date for the next exam specified AND a process to follow up with patients regarding missed or unscheduled appointments
Quality 226: Preventive Care And Screening: Tobacco Use: Screening And Cessation Intervention: Patient screened for tobacco use and is an ex/non-smoker
Quality 397: Melanoma: Reporting: Pathology report includes the pT Category, thickness, ulceration and mitotic rate, peripheral and deep margin status and presence or absence of microsatellitosis for invasive tumors.
Quality 431: Preventive Care And Screening: Unhealthy Alcohol Use - Screening: Patient not identified as an unhealthy alcohol user when screened for unhealthy alcohol use using a systematic screening method
Detail Level: Detailed

## 2025-07-10 NOTE — PROCEDURE: BIOPSY BY SHAVE METHOD
Detail Level: Detailed
Depth Of Biopsy: dermis
Was A Bandage Applied: Yes
Size Of Lesion In Cm: 0
Biopsy Type: H and E
Biopsy Method: Dermablade
Anesthesia Type: 1% lidocaine with epinephrine
Anesthesia Volume In Cc: 1
Hemostasis: Drysol and Electrocautery
Wound Care: Petrolatum
Dressing: Band-Aid
Destruction After The Procedure: No
Type Of Destruction Used: Electrodesiccation and Curettage
Curettage Text: The wound bed was treated with curettage after the biopsy was performed.
Cryotherapy Text: The wound bed was treated with cryotherapy after the biopsy was performed.
Electrodesiccation Text: The wound bed was treated with electrodesiccation after the biopsy was performed.
Electrodesiccation And Curettage Text: The wound bed was treated with electrodesiccation and curettage after the biopsy was performed.
Silver Nitrate Text: The wound bed was treated with silver nitrate after the biopsy was performed.
Lab: [id number]
Lab Facility: 
Medical Necessity Information: It is in your best interest to select a reason for this procedure from the list below. All of these items fulfill various CMS LCD requirements except the new and changing color options.
Consent: Verbal consent was obtained and risks were reviewed including but not limited to scarring, infection, bleeding, scabbing, incomplete removal, nerve damage and allergy to anesthesia.
Post-Care Instructions: I reviewed with the patient in detail post-care instructions. Patient is to keep the biopsy site dry overnight, and then apply Vaseline twice daily until healed. Patient may apply hydrogen peroxide soaks to remove any crusting.
Notification Instructions: Patient will be notified of biopsy results. However, patient instructed to call the office if not contacted within 2 weeks.
Billing Type: Third-Party Bill
Information: Selecting Yes will display possible errors in your note based on the variables you have selected. This validation is only offered as a suggestion for you. PLEASE NOTE THAT THE VALIDATION TEXT WILL BE REMOVED WHEN YOU FINALIZE YOUR NOTE. IF YOU WANT TO FAX A PRELIMINARY NOTE YOU WILL NEED TO TOGGLE THIS TO 'NO' IF YOU DO NOT WANT IT IN YOUR FAXED NOTE.

## 2025-07-11 ENCOUNTER — HOSPITAL ENCOUNTER (OUTPATIENT)
Dept: HEMATOLOGY ONCOLOGY | Facility: MEDICAL CENTER | Age: 70
End: 2025-07-11
Attending: STUDENT IN AN ORGANIZED HEALTH CARE EDUCATION/TRAINING PROGRAM
Payer: MEDICARE

## 2025-07-11 ENCOUNTER — OUTPATIENT INFUSION SERVICES (OUTPATIENT)
Dept: ONCOLOGY | Facility: MEDICAL CENTER | Age: 70
End: 2025-07-11
Attending: INTERNAL MEDICINE
Payer: MEDICARE

## 2025-07-11 VITALS
DIASTOLIC BLOOD PRESSURE: 77 MMHG | TEMPERATURE: 97.7 F | RESPIRATION RATE: 20 BRPM | HEART RATE: 88 BPM | HEIGHT: 72 IN | OXYGEN SATURATION: 96 % | BODY MASS INDEX: 27.98 KG/M2 | WEIGHT: 206.57 LBS | SYSTOLIC BLOOD PRESSURE: 130 MMHG

## 2025-07-11 VITALS
WEIGHT: 206.46 LBS | HEIGHT: 70 IN | OXYGEN SATURATION: 96 % | TEMPERATURE: 97.7 F | HEART RATE: 85 BPM | BODY MASS INDEX: 29.56 KG/M2 | SYSTOLIC BLOOD PRESSURE: 114 MMHG | DIASTOLIC BLOOD PRESSURE: 64 MMHG

## 2025-07-11 DIAGNOSIS — C79.51 PROSTATE CANCER METASTATIC TO BONE (HCC): Primary | ICD-10-CM

## 2025-07-11 DIAGNOSIS — C61 PROSTATE CANCER METASTATIC TO BONE (HCC): Primary | ICD-10-CM

## 2025-07-11 DIAGNOSIS — R53.82 CHRONIC FATIGUE: ICD-10-CM

## 2025-07-11 DIAGNOSIS — R73.9 HYPERGLYCEMIA: ICD-10-CM

## 2025-07-11 DIAGNOSIS — E78.5 HYPERLIPIDEMIA, UNSPECIFIED HYPERLIPIDEMIA TYPE: ICD-10-CM

## 2025-07-11 DIAGNOSIS — R35.0 BENIGN PROSTATIC HYPERPLASIA WITH URINARY FREQUENCY: ICD-10-CM

## 2025-07-11 DIAGNOSIS — N40.1 BENIGN PROSTATIC HYPERPLASIA WITH URINARY FREQUENCY: ICD-10-CM

## 2025-07-11 PROCEDURE — 700111 HCHG RX REV CODE 636 W/ 250 OVERRIDE (IP): Mod: JZ,TB | Performed by: NURSE PRACTITIONER

## 2025-07-11 PROCEDURE — 99214 OFFICE O/P EST MOD 30 MIN: CPT | Performed by: STUDENT IN AN ORGANIZED HEALTH CARE EDUCATION/TRAINING PROGRAM

## 2025-07-11 PROCEDURE — 96402 CHEMO HORMON ANTINEOPL SQ/IM: CPT

## 2025-07-11 RX ADMIN — LEUPROLIDE ACETATE 22.5 MG: KIT at 11:48

## 2025-07-11 ASSESSMENT — FIBROSIS 4 INDEX
FIB4 SCORE: 0.91
FIB4 SCORE: 0.91

## 2025-07-11 NOTE — PROGRESS NOTES
Pt presented to Infusion for q3 month Lupron injection. POC discussed and pt verbalized understanding. Pt denies pain or s/s of acute illness today. Patient had labs drawn 07/09, which included PSA. Lupron injection administered per MA in the left glute, band-aid applied to site. No s/s of reactions or complications noted.  Future appts confirmed. Pt discharged to self care in [facility].

## 2025-07-11 NOTE — PROGRESS NOTES
PROGRESS NOTE: HEMATOLOGY/ONCOLOGY     Referring Physician:   Primary Care:  Darling Franklin M.D.    Diagnosis: Metastatic prostate cancer to bone    History of Presenting Illness:  Manuel Quinn is a 69 y.o. male who has a no significant past medical history except kidney stone several years ago.  He has a history of BPH and he presented with a urinary retention on the March 28, 2024.  His initial PSA was 163 and a follow-up PSA was 148 on April 11, 2024.  Patient had the MRI of the prostate on the May 11, 2024 which showed evidence of prostate cancer extending to the seminal vesicle and there was a signs of bilateral ischium bone metastasis.  Eventually, he had a prostate biopsy on June 4, 2024 which showed group 3, Aquiles 7 (3+4) prostate cancer.  He had a bone scan on May 29, 2024 which showed evidence of bilateral inferior pubic ramus and left posterior rib metastasis.  Patient had a PSMA PET scan on July 20, 2024 which showed evidence of bilateral pubic ramus, right posterior ilium, and a left posterior sacral metastasis.  His creatinine is 1.5 and his GFR is 50. His PSA came down from 148-67.9 on July 29, 2024.  Patient had a Lupron 22.5 mg on July 5, 2024.  Patient will have a second Lupron injection on October 4, 2024.  Patient had a PSA of 4.76 on October 2, 2024. He comes for routine follow-up today.      Interval History:  Is doing well today.  Is taking supplements including one called Quercetin.  He presents with wife today with many questions regarding diet and lifestyle, all were answered to the best of my ability.  Overall tolerating lupron, abiraterone and prednisone well.     7/5/2024 : C 1 Lupron 22.5mg  8/1/2024 : Zytiga/prednisone.  10/4/2024 : C 2 Lupron 22.5 mg  1/10/25 : C 3 Lupron  4/4/25 : C 4 Lupron    Past Medical History:   Diagnosis Date    Inguinal hernia     L    Kidney stone      No past surgical history on file.    Family History   Family history unknown: Yes  "    Allergies as of 07/11/2025 - Reviewed 07/11/2025   Allergen Reaction Noted    Eggs or egg-derived products [chicken-derived products] Diarrhea and Vomiting 03/12/2024     Current Outpatient Medications:     tamsulosin (FLOMAX) 0.4 MG capsule, TAKE 1 CAPSULE BY MOUTH 1/2 HOUR AFTER BREAKFAST, Disp: 90 Capsule, Rfl: 3    Abiraterone Acetate (ZYTIGA) 250 MG Tab, Take 4 tablets (1,000 mg) by mouth every day., Disp: 120 Tablet, Rfl: 11    Ascorbic Acid (VITAMIN C) 1000 MG Tab, Take 500 mg by mouth every day., Disp: , Rfl:     NON SPECIFIED, Supplement called Quercitian, Disp: , Rfl:     Cholecalciferol (VITAMIN D) 2000 UNIT Tab, Take 2,000 Units by mouth every day., Disp: , Rfl:     Zinc 20 MG Cap, Take 20 mg by mouth every day., Disp: , Rfl:     predniSONE (DELTASONE) 5 MG Tab, Take 1 Tablet by mouth 2 times a day., Disp: 180 Tablet, Rfl: 3    Review of Systems:  Patient denies fever, chills, nausea, vomiting, chest pain, sob, blood in stools, black stool, blood in urine. All systems are reviewed See HPI.  He is complaining of significant fatigue.    Physical Exam:  /64 (BP Location: Right arm, Patient Position: Sitting, BP Cuff Size: Adult)   Pulse 85   Temp 36.5 °C (97.7 °F) (Temporal)   Ht 1.78 m (5' 10.08\")   Wt 93.6 kg (206 lb 7.4 oz)   SpO2 96%  Body mass index is 29.56 kg/m².  Constitutional:  NAD, well appearing.  HEENT:   SIERRA EOMI  Cardiovascular: RRR.   No m/r/g. No carotid bruits.       Lungs:   Clear, no wheezing, no rales, no respiratory distress.  Abdomen: Soft, non-tender. + BS, no masses, no suprapubic tenderness, no hepatomegaly.  Extremities:  No pedal edema. Bilateral and radial pulses present.  Skin:  Warm and dry.    Neurologic: Alert & oriented x 3, CN II-XII grossly intact, strength and sensation grossly intact.  No focal deficits noted.  Psychiatric:  Affect normal, mood normal, judgment normal.    Assessment & Plan:    #1. Metastatic prostate cancer to the bone, T3bM1  -will " plan to continue ADT with lupron q3 months and abiraterone/prednisone until intolerance or progression.  Seems to be tolerating well at this point.   -He is s/p RT and also tolerated well  -with many questions regarding diet and lifestyle, referred to Oncology wellness for continued discussion  -PSA continues to trend down, testosterone undetectable and reviewed all labs with them today.  Will plan to repeat at next visit.  With macrocytosis so will evaluate with next set of labs  -Will need DEXA scan every 2 years, lipid profile annually  -will discuss genetic testing and NGS at next visit    We will see this patient in 12 weeks right before Lupron injection with a follow-up CBC, CMP, PSA for continuous care.  I recommended for the patient to call us if there is any problem in between.

## 2025-07-15 DIAGNOSIS — C79.51 PROSTATE CANCER METASTATIC TO BONE (HCC): ICD-10-CM

## 2025-07-15 DIAGNOSIS — C61 PROSTATE CANCER METASTATIC TO BONE (HCC): ICD-10-CM

## 2025-07-15 RX ORDER — PREDNISONE 5 MG/1
5 TABLET ORAL 2 TIMES DAILY
Qty: 180 TABLET | Refills: 3 | Status: SHIPPED | OUTPATIENT
Start: 2025-07-15

## 2025-07-31 ENCOUNTER — APPOINTMENT (OUTPATIENT)
Dept: URBAN - METROPOLITAN AREA CLINIC 6 | Facility: CLINIC | Age: 70
Setting detail: DERMATOLOGY
End: 2025-07-31

## 2025-07-31 PROBLEM — C44.519 BASAL CELL CARCINOMA OF SKIN OF OTHER PART OF TRUNK: Status: ACTIVE | Noted: 2025-07-31

## 2025-07-31 PROCEDURE — ? EXCISION

## 2025-08-06 DIAGNOSIS — C79.51 PROSTATE CANCER METASTATIC TO BONE (HCC): ICD-10-CM

## 2025-08-06 DIAGNOSIS — C61 PROSTATE CANCER METASTATIC TO BONE (HCC): ICD-10-CM

## 2025-08-06 RX ORDER — ABIRATERONE ACETATE 250 MG/1
TABLET ORAL
Qty: 120 TABLET | Refills: 11 | Status: SHIPPED | OUTPATIENT
Start: 2025-08-06

## 2025-08-14 ENCOUNTER — APPOINTMENT (OUTPATIENT)
Dept: URBAN - METROPOLITAN AREA CLINIC 6 | Facility: CLINIC | Age: 70
Setting detail: DERMATOLOGY
End: 2025-08-14

## 2025-08-14 DIAGNOSIS — Z48.02 ENCOUNTER FOR REMOVAL OF SUTURES: ICD-10-CM

## 2025-08-14 PROCEDURE — ? SUTURE REMOVAL (GLOBAL PERIOD)

## 2025-08-14 ASSESSMENT — LOCATION ZONE DERM: LOCATION ZONE: TRUNK

## 2025-08-14 ASSESSMENT — LOCATION DETAILED DESCRIPTION DERM: LOCATION DETAILED: LEFT SUPERIOR UPPER BACK

## 2025-08-14 ASSESSMENT — LOCATION SIMPLE DESCRIPTION DERM: LOCATION SIMPLE: LEFT UPPER BACK
